# Patient Record
Sex: MALE | Race: BLACK OR AFRICAN AMERICAN | Employment: OTHER | URBAN - METROPOLITAN AREA
[De-identification: names, ages, dates, MRNs, and addresses within clinical notes are randomized per-mention and may not be internally consistent; named-entity substitution may affect disease eponyms.]

---

## 2019-01-01 ENCOUNTER — HOSPITAL ENCOUNTER (EMERGENCY)
Age: 74
Discharge: ANOTHER HOSPITAL | End: 2019-04-03
Attending: EMERGENCY MEDICINE | Admitting: EMERGENCY MEDICINE
Payer: COMMERCIAL

## 2019-01-01 ENCOUNTER — HOSPITAL ENCOUNTER (INPATIENT)
Age: 74
LOS: 1 days | Discharge: HOSPICE | DRG: 720 | End: 2019-09-02
Attending: EMERGENCY MEDICINE | Admitting: INTERNAL MEDICINE
Payer: MEDICAID

## 2019-01-01 ENCOUNTER — HOME CARE VISIT (OUTPATIENT)
Dept: HOSPICE | Facility: HOSPICE | Age: 74
End: 2019-01-01
Payer: COMMERCIAL

## 2019-01-01 ENCOUNTER — APPOINTMENT (OUTPATIENT)
Dept: GENERAL RADIOLOGY | Age: 74
End: 2019-01-01
Attending: EMERGENCY MEDICINE
Payer: COMMERCIAL

## 2019-01-01 ENCOUNTER — HOSPICE ADMISSION (OUTPATIENT)
Dept: HOSPICE | Facility: HOSPICE | Age: 74
End: 2019-01-01
Payer: COMMERCIAL

## 2019-01-01 ENCOUNTER — HOSPITAL ENCOUNTER (INPATIENT)
Age: 74
LOS: 19 days | DRG: 951 | End: 2019-09-21
Attending: FAMILY MEDICINE | Admitting: FAMILY MEDICINE
Payer: OTHER MISCELLANEOUS

## 2019-01-01 ENCOUNTER — HOSPITAL ENCOUNTER (EMERGENCY)
Age: 74
Discharge: ANOTHER HOSPITAL | End: 2019-07-30
Attending: EMERGENCY MEDICINE
Payer: COMMERCIAL

## 2019-01-01 ENCOUNTER — APPOINTMENT (OUTPATIENT)
Dept: CT IMAGING | Age: 74
End: 2019-01-01
Attending: EMERGENCY MEDICINE
Payer: COMMERCIAL

## 2019-01-01 ENCOUNTER — APPOINTMENT (OUTPATIENT)
Dept: GENERAL RADIOLOGY | Age: 74
DRG: 720 | End: 2019-01-01
Attending: EMERGENCY MEDICINE
Payer: MEDICAID

## 2019-01-01 VITALS
RESPIRATION RATE: 27 BRPM | DIASTOLIC BLOOD PRESSURE: 81 MMHG | TEMPERATURE: 97.6 F | BODY MASS INDEX: 23.3 KG/M2 | HEART RATE: 96 BPM | SYSTOLIC BLOOD PRESSURE: 118 MMHG | OXYGEN SATURATION: 97 % | WEIGHT: 145 LBS | HEIGHT: 66 IN

## 2019-01-01 VITALS
TEMPERATURE: 95.3 F | OXYGEN SATURATION: 97 % | RESPIRATION RATE: 12 BRPM | BODY MASS INDEX: 23.4 KG/M2 | WEIGHT: 145 LBS | DIASTOLIC BLOOD PRESSURE: 32 MMHG | HEART RATE: 72 BPM | SYSTOLIC BLOOD PRESSURE: 42 MMHG

## 2019-01-01 VITALS
WEIGHT: 145 LBS | HEIGHT: 66 IN | SYSTOLIC BLOOD PRESSURE: 113 MMHG | DIASTOLIC BLOOD PRESSURE: 85 MMHG | OXYGEN SATURATION: 88 % | TEMPERATURE: 97.5 F | HEART RATE: 92 BPM | BODY MASS INDEX: 23.3 KG/M2 | RESPIRATION RATE: 36 BRPM

## 2019-01-01 VITALS
BODY MASS INDEX: 23.3 KG/M2 | TEMPERATURE: 97.2 F | DIASTOLIC BLOOD PRESSURE: 44 MMHG | SYSTOLIC BLOOD PRESSURE: 71 MMHG | OXYGEN SATURATION: 60 % | HEIGHT: 66 IN | HEART RATE: 86 BPM | RESPIRATION RATE: 33 BRPM | WEIGHT: 145 LBS

## 2019-01-01 DIAGNOSIS — J90 PLEURAL EFFUSION, BILATERAL: Primary | ICD-10-CM

## 2019-01-01 DIAGNOSIS — R06.02 SOB (SHORTNESS OF BREATH): ICD-10-CM

## 2019-01-01 DIAGNOSIS — R65.21 SEPTIC SHOCK (HCC): Primary | ICD-10-CM

## 2019-01-01 DIAGNOSIS — J39.8 INCREASED TRACHEAL SECRETIONS: ICD-10-CM

## 2019-01-01 DIAGNOSIS — R52 DIFFUSE PAIN: ICD-10-CM

## 2019-01-01 DIAGNOSIS — R09.2 RESPIRATORY ARREST (HCC): Primary | ICD-10-CM

## 2019-01-01 DIAGNOSIS — A41.9 SEPSIS, DUE TO UNSPECIFIED ORGANISM: ICD-10-CM

## 2019-01-01 DIAGNOSIS — R45.1 RESTLESSNESS AND AGITATION: ICD-10-CM

## 2019-01-01 DIAGNOSIS — R06.02 SHORTNESS OF BREATH: ICD-10-CM

## 2019-01-01 DIAGNOSIS — A41.9 SEPTIC SHOCK (HCC): Primary | ICD-10-CM

## 2019-01-01 DIAGNOSIS — Z71.89 DNR (DO NOT RESUSCITATE) DISCUSSION: ICD-10-CM

## 2019-01-01 DIAGNOSIS — Z51.5 HOSPICE CARE PATIENT: ICD-10-CM

## 2019-01-01 DIAGNOSIS — S09.90XA CLOSED HEAD INJURY, INITIAL ENCOUNTER: ICD-10-CM

## 2019-01-01 DIAGNOSIS — Z71.89 GOALS OF CARE, COUNSELING/DISCUSSION: ICD-10-CM

## 2019-01-01 LAB
ALBUMIN SERPL-MCNC: 2.2 G/DL (ref 3.5–5)
ALBUMIN SERPL-MCNC: 2.4 G/DL (ref 3.5–5)
ALBUMIN SERPL-MCNC: 3.2 G/DL (ref 3.5–5)
ALBUMIN/GLOB SERPL: 0.4 {RATIO} (ref 1.1–2.2)
ALBUMIN/GLOB SERPL: 0.6 {RATIO} (ref 1.1–2.2)
ALBUMIN/GLOB SERPL: 0.7 {RATIO} (ref 1.1–2.2)
ALP SERPL-CCNC: 49 U/L (ref 45–117)
ALP SERPL-CCNC: 52 U/L (ref 45–117)
ALP SERPL-CCNC: 99 U/L (ref 45–117)
ALT SERPL-CCNC: 24 U/L (ref 12–78)
ALT SERPL-CCNC: 24 U/L (ref 12–78)
ALT SERPL-CCNC: 378 U/L (ref 12–78)
AMPHET UR QL SCN: NEGATIVE
ANION GAP SERPL CALC-SCNC: 10 MMOL/L (ref 5–15)
ANION GAP SERPL CALC-SCNC: 5 MMOL/L (ref 5–15)
ANION GAP SERPL CALC-SCNC: 8 MMOL/L (ref 5–15)
APPEARANCE UR: ABNORMAL
APPEARANCE UR: ABNORMAL
APTT PPP: 27.7 SEC (ref 22.1–32)
ARTERIAL PATENCY WRIST A: ABNORMAL
AST SERPL-CCNC: 32 U/L (ref 15–37)
AST SERPL-CCNC: 33 U/L (ref 15–37)
AST SERPL-CCNC: 971 U/L (ref 15–37)
ATRIAL RATE: 107 BPM
ATRIAL RATE: 88 BPM
ATRIAL RATE: 91 BPM
BACTERIA SPEC CULT: ABNORMAL
BACTERIA SPEC CULT: NORMAL
BACTERIA SPEC CULT: NORMAL
BACTERIA URNS QL MICRO: ABNORMAL /HPF
BACTERIA URNS QL MICRO: ABNORMAL /HPF
BARBITURATES UR QL SCN: NEGATIVE
BASE DEFICIT BLDA-SCNC: 1.8 MMOL/L
BASOPHILS # BLD: 0 K/UL (ref 0–0.1)
BASOPHILS NFR BLD: 0 % (ref 0–1)
BDY SITE: ABNORMAL
BENZODIAZ UR QL: NEGATIVE
BILIRUB SERPL-MCNC: 0.3 MG/DL (ref 0.2–1)
BILIRUB SERPL-MCNC: 0.5 MG/DL (ref 0.2–1)
BILIRUB SERPL-MCNC: 0.7 MG/DL (ref 0.2–1)
BILIRUB UR QL CFM: NEGATIVE
BILIRUB UR QL: ABNORMAL
BILIRUB UR QL: NEGATIVE
BNP SERPL-MCNC: ABNORMAL PG/ML
BNP SERPL-MCNC: ABNORMAL PG/ML
BUN SERPL-MCNC: 21 MG/DL (ref 6–20)
BUN SERPL-MCNC: 24 MG/DL (ref 6–20)
BUN SERPL-MCNC: 53 MG/DL (ref 6–20)
BUN/CREAT SERPL: 18 (ref 12–20)
BUN/CREAT SERPL: 25 (ref 12–20)
BUN/CREAT SERPL: 31 (ref 12–20)
CALCIUM SERPL-MCNC: 8.5 MG/DL (ref 8.5–10.1)
CALCIUM SERPL-MCNC: 8.6 MG/DL (ref 8.5–10.1)
CALCIUM SERPL-MCNC: 9.1 MG/DL (ref 8.5–10.1)
CALCULATED P AXIS, ECG09: 26 DEGREES
CALCULATED P AXIS, ECG09: 52 DEGREES
CALCULATED P AXIS, ECG09: 93 DEGREES
CALCULATED R AXIS, ECG10: -55 DEGREES
CALCULATED R AXIS, ECG10: -56 DEGREES
CALCULATED R AXIS, ECG10: -58 DEGREES
CALCULATED T AXIS, ECG11: 100 DEGREES
CALCULATED T AXIS, ECG11: 107 DEGREES
CALCULATED T AXIS, ECG11: 71 DEGREES
CANNABINOIDS UR QL SCN: NEGATIVE
CC UR VC: ABNORMAL
CC UR VC: ABNORMAL
CHLORIDE SERPL-SCNC: 100 MMOL/L (ref 97–108)
CHLORIDE SERPL-SCNC: 101 MMOL/L (ref 97–108)
CHLORIDE SERPL-SCNC: 110 MMOL/L (ref 97–108)
CO2 SERPL-SCNC: 24 MMOL/L (ref 21–32)
CO2 SERPL-SCNC: 28 MMOL/L (ref 21–32)
CO2 SERPL-SCNC: 29 MMOL/L (ref 21–32)
COCAINE UR QL SCN: NEGATIVE
COLOR UR: ABNORMAL
COLOR UR: ABNORMAL
COMMENT, HOLDF: NORMAL
CREAT SERPL-MCNC: 0.77 MG/DL (ref 0.7–1.3)
CREAT SERPL-MCNC: 1.14 MG/DL (ref 0.7–1.3)
CREAT SERPL-MCNC: 2.12 MG/DL (ref 0.7–1.3)
DIAGNOSIS, 93000: NORMAL
DIFFERENTIAL METHOD BLD: ABNORMAL
DRUG SCRN COMMENT,DRGCM: ABNORMAL
EOSINOPHIL # BLD: 0 K/UL (ref 0–0.4)
EOSINOPHIL # BLD: 0.2 K/UL (ref 0–0.4)
EOSINOPHIL # BLD: 0.3 K/UL (ref 0–0.4)
EOSINOPHIL NFR BLD: 0 % (ref 0–7)
EOSINOPHIL NFR BLD: 1 % (ref 0–7)
EOSINOPHIL NFR BLD: 4 % (ref 0–7)
EPITH CASTS URNS QL MICRO: ABNORMAL /LPF
EPITH CASTS URNS QL MICRO: ABNORMAL /LPF
ERYTHROCYTE [DISTWIDTH] IN BLOOD BY AUTOMATED COUNT: 13.3 % (ref 11.5–14.5)
ERYTHROCYTE [DISTWIDTH] IN BLOOD BY AUTOMATED COUNT: 16.3 % (ref 11.5–14.5)
ERYTHROCYTE [DISTWIDTH] IN BLOOD BY AUTOMATED COUNT: 17.6 % (ref 11.5–14.5)
ETHANOL SERPL-MCNC: <10 MG/DL
GAS FLOW.O2 O2 DELIVERY SYS: 12 L/MIN
GLOBULIN SER CALC-MCNC: 4.1 G/DL (ref 2–4)
GLOBULIN SER CALC-MCNC: 4.4 G/DL (ref 2–4)
GLOBULIN SER CALC-MCNC: 5.2 G/DL (ref 2–4)
GLUCOSE SERPL-MCNC: 100 MG/DL (ref 65–100)
GLUCOSE SERPL-MCNC: 122 MG/DL (ref 65–100)
GLUCOSE SERPL-MCNC: 249 MG/DL (ref 65–100)
GLUCOSE UR STRIP.AUTO-MCNC: 250 MG/DL
GLUCOSE UR STRIP.AUTO-MCNC: NEGATIVE MG/DL
HCO3 BLDA-SCNC: 20 MMOL/L (ref 22–26)
HCT VFR BLD AUTO: 35.1 % (ref 36.6–50.3)
HCT VFR BLD AUTO: 38.9 % (ref 36.6–50.3)
HCT VFR BLD AUTO: 40.1 % (ref 36.6–50.3)
HGB BLD-MCNC: 11.1 G/DL (ref 12.1–17)
HGB BLD-MCNC: 12.2 G/DL (ref 12.1–17)
HGB BLD-MCNC: 12.3 G/DL (ref 12.1–17)
HGB UR QL STRIP: ABNORMAL
HGB UR QL STRIP: NEGATIVE
IMM GRANULOCYTES # BLD AUTO: 0 K/UL (ref 0–0.04)
IMM GRANULOCYTES # BLD AUTO: 0 K/UL (ref 0–0.04)
IMM GRANULOCYTES # BLD AUTO: 0.1 K/UL (ref 0–0.04)
IMM GRANULOCYTES NFR BLD AUTO: 0 % (ref 0–0.5)
IMM GRANULOCYTES NFR BLD AUTO: 0 % (ref 0–0.5)
IMM GRANULOCYTES NFR BLD AUTO: 1 % (ref 0–0.5)
INR PPP: 1.1 (ref 0.9–1.1)
KETONES UR QL STRIP.AUTO: ABNORMAL MG/DL
KETONES UR QL STRIP.AUTO: NEGATIVE MG/DL
LACTATE BLD-SCNC: 2.1 MMOL/L (ref 0.4–2)
LACTATE BLD-SCNC: 2.43 MMOL/L (ref 0.4–2)
LEUKOCYTE ESTERASE UR QL STRIP.AUTO: ABNORMAL
LEUKOCYTE ESTERASE UR QL STRIP.AUTO: ABNORMAL
LYMPHOCYTES # BLD: 1.1 K/UL (ref 0.8–3.5)
LYMPHOCYTES # BLD: 1.9 K/UL (ref 0.8–3.5)
LYMPHOCYTES # BLD: 5.3 K/UL (ref 0.8–3.5)
LYMPHOCYTES NFR BLD: 13 % (ref 12–49)
LYMPHOCYTES NFR BLD: 25 % (ref 12–49)
LYMPHOCYTES NFR BLD: 27 % (ref 12–49)
MAGNESIUM SERPL-MCNC: 2.2 MG/DL (ref 1.6–2.4)
MCH RBC QN AUTO: 33.2 PG (ref 26–34)
MCH RBC QN AUTO: 33.4 PG (ref 26–34)
MCH RBC QN AUTO: 34.5 PG (ref 26–34)
MCHC RBC AUTO-ENTMCNC: 30.7 G/DL (ref 30–36.5)
MCHC RBC AUTO-ENTMCNC: 31.4 G/DL (ref 30–36.5)
MCHC RBC AUTO-ENTMCNC: 31.6 G/DL (ref 30–36.5)
MCV RBC AUTO: 105.7 FL (ref 80–99)
MCV RBC AUTO: 108.4 FL (ref 80–99)
MCV RBC AUTO: 109.9 FL (ref 80–99)
METHADONE UR QL: NEGATIVE
MONOCYTES # BLD: 0.4 K/UL (ref 0–1)
MONOCYTES # BLD: 0.6 K/UL (ref 0–1)
MONOCYTES # BLD: 1.9 K/UL (ref 0–1)
MONOCYTES NFR BLD: 5 % (ref 5–13)
MONOCYTES NFR BLD: 9 % (ref 5–13)
MONOCYTES NFR BLD: 9 % (ref 5–13)
MYELOCYTES NFR BLD MANUAL: 1 %
NEUTS BAND NFR BLD MANUAL: 3 %
NEUTS SEG # BLD: 13.5 K/UL (ref 1.8–8)
NEUTS SEG # BLD: 4.4 K/UL (ref 1.8–8)
NEUTS SEG # BLD: 6.9 K/UL (ref 1.8–8)
NEUTS SEG NFR BLD: 60 % (ref 32–75)
NEUTS SEG NFR BLD: 61 % (ref 32–75)
NEUTS SEG NFR BLD: 81 % (ref 32–75)
NITRITE UR QL STRIP.AUTO: NEGATIVE
NITRITE UR QL STRIP.AUTO: POSITIVE
NRBC # BLD: 0 K/UL (ref 0–0.01)
NRBC # BLD: 0.09 K/UL (ref 0–0.01)
NRBC BLD-RTO: 0 PER 100 WBC
NRBC BLD-RTO: 1.1 PER 100 WBC
OPIATES UR QL: POSITIVE
P-R INTERVAL, ECG05: 176 MS
P-R INTERVAL, ECG05: 196 MS
P-R INTERVAL, ECG05: 224 MS
PCO2 BLDA: 28 MMHG (ref 35–45)
PCP UR QL: NEGATIVE
PH BLDA: 7.48 [PH] (ref 7.35–7.45)
PH UR STRIP: 6.5 [PH] (ref 5–8)
PH UR STRIP: 7.5 [PH] (ref 5–8)
PLATELET # BLD AUTO: 177 K/UL (ref 150–400)
PLATELET # BLD AUTO: 312 K/UL (ref 150–400)
PLATELET # BLD AUTO: 380 K/UL (ref 150–400)
PLATELET COMMENTS,PCOM: ABNORMAL
PMV BLD AUTO: 10.3 FL (ref 8.9–12.9)
PMV BLD AUTO: 10.8 FL (ref 8.9–12.9)
PMV BLD AUTO: 10.9 FL (ref 8.9–12.9)
PO2 BLDA: 133 MMHG (ref 80–100)
POTASSIUM SERPL-SCNC: 4.3 MMOL/L (ref 3.5–5.1)
POTASSIUM SERPL-SCNC: 4.7 MMOL/L (ref 3.5–5.1)
POTASSIUM SERPL-SCNC: 5.3 MMOL/L (ref 3.5–5.1)
PROT SERPL-MCNC: 6.5 G/DL (ref 6.4–8.2)
PROT SERPL-MCNC: 7.4 G/DL (ref 6.4–8.2)
PROT SERPL-MCNC: 7.6 G/DL (ref 6.4–8.2)
PROT UR STRIP-MCNC: >300 MG/DL
PROT UR STRIP-MCNC: NEGATIVE MG/DL
PROTHROMBIN TIME: 10.4 SEC (ref 9–11.1)
Q-T INTERVAL, ECG07: 320 MS
Q-T INTERVAL, ECG07: 366 MS
Q-T INTERVAL, ECG07: 398 MS
QRS DURATION, ECG06: 106 MS
QRS DURATION, ECG06: 118 MS
QRS DURATION, ECG06: 132 MS
QTC CALCULATION (BEZET), ECG08: 425 MS
QTC CALCULATION (BEZET), ECG08: 442 MS
QTC CALCULATION (BEZET), ECG08: 489 MS
RBC # BLD AUTO: 3.32 M/UL (ref 4.1–5.7)
RBC # BLD AUTO: 3.54 M/UL (ref 4.1–5.7)
RBC # BLD AUTO: 3.7 M/UL (ref 4.1–5.7)
RBC #/AREA URNS HPF: ABNORMAL /HPF (ref 0–5)
RBC #/AREA URNS HPF: ABNORMAL /HPF (ref 0–5)
RBC MORPH BLD: ABNORMAL
SAMPLES BEING HELD,HOLD: NORMAL
SAO2 % BLD: 99 % (ref 92–97)
SAO2% DEVICE SAO2% SENSOR NAME: ABNORMAL
SERVICE CMNT-IMP: ABNORMAL
SERVICE CMNT-IMP: ABNORMAL
SERVICE CMNT-IMP: NORMAL
SERVICE CMNT-IMP: NORMAL
SODIUM SERPL-SCNC: 137 MMOL/L (ref 136–145)
SODIUM SERPL-SCNC: 139 MMOL/L (ref 136–145)
SODIUM SERPL-SCNC: 139 MMOL/L (ref 136–145)
SP GR UR REFRACTOMETRY: 1.01 (ref 1–1.03)
SP GR UR REFRACTOMETRY: 1.02 (ref 1–1.03)
SPECIMEN SITE: ABNORMAL
THERAPEUTIC RANGE,PTTT: NORMAL SECS (ref 58–77)
TROPONIN I SERPL-MCNC: 0.13 NG/ML
TROPONIN I SERPL-MCNC: 0.54 NG/ML
TROPONIN I SERPL-MCNC: 0.79 NG/ML
UA: UC IF INDICATED,UAUC: ABNORMAL
UA: UC IF INDICATED,UAUC: ABNORMAL
UR CULT HOLD, URHOLD: NORMAL
UROBILINOGEN UR QL STRIP.AUTO: 0.2 EU/DL (ref 0.2–1)
UROBILINOGEN UR QL STRIP.AUTO: 1 EU/DL (ref 0.2–1)
VENTRICULAR RATE, ECG03: 106 BPM
VENTRICULAR RATE, ECG03: 88 BPM
VENTRICULAR RATE, ECG03: 91 BPM
WBC # BLD AUTO: 21.1 K/UL (ref 4.1–11.1)
WBC # BLD AUTO: 7.2 K/UL (ref 4.1–11.1)
WBC # BLD AUTO: 8.5 K/UL (ref 4.1–11.1)
WBC MORPH BLD: ABNORMAL
WBC URNS QL MICRO: ABNORMAL /HPF (ref 0–4)
WBC URNS QL MICRO: ABNORMAL /HPF (ref 0–4)

## 2019-01-01 PROCEDURE — 77030011256 HC DRSG MEPILEX <16IN NO BORD MOLN -A

## 2019-01-01 PROCEDURE — 74011250637 HC RX REV CODE- 250/637: Performed by: FAMILY MEDICINE

## 2019-01-01 PROCEDURE — 65270000029 HC RM PRIVATE

## 2019-01-01 PROCEDURE — 74011000250 HC RX REV CODE- 250: Performed by: FAMILY MEDICINE

## 2019-01-01 PROCEDURE — G0299 HHS/HOSPICE OF RN EA 15 MIN: HCPCS

## 2019-01-01 PROCEDURE — 74011250636 HC RX REV CODE- 250/636: Performed by: INTERNAL MEDICINE

## 2019-01-01 PROCEDURE — 87086 URINE CULTURE/COLONY COUNT: CPT

## 2019-01-01 PROCEDURE — 0656 HSPC GENERAL INPATIENT

## 2019-01-01 PROCEDURE — 84484 ASSAY OF TROPONIN QUANT: CPT

## 2019-01-01 PROCEDURE — 87077 CULTURE AEROBIC IDENTIFY: CPT

## 2019-01-01 PROCEDURE — 36415 COLL VENOUS BLD VENIPUNCTURE: CPT

## 2019-01-01 PROCEDURE — 94760 N-INVAS EAR/PLS OXIMETRY 1: CPT

## 2019-01-01 PROCEDURE — 93005 ELECTROCARDIOGRAM TRACING: CPT

## 2019-01-01 PROCEDURE — 74011250636 HC RX REV CODE- 250/636: Performed by: EMERGENCY MEDICINE

## 2019-01-01 PROCEDURE — 83880 ASSAY OF NATRIURETIC PEPTIDE: CPT

## 2019-01-01 PROCEDURE — 74011250636 HC RX REV CODE- 250/636: Performed by: FAMILY MEDICINE

## 2019-01-01 PROCEDURE — 71045 X-RAY EXAM CHEST 1 VIEW: CPT

## 2019-01-01 PROCEDURE — 96361 HYDRATE IV INFUSION ADD-ON: CPT

## 2019-01-01 PROCEDURE — 77030018846 HC SOL IRR STRL H20 ICUM -A

## 2019-01-01 PROCEDURE — 80053 COMPREHEN METABOLIC PANEL: CPT

## 2019-01-01 PROCEDURE — 36600 WITHDRAWAL OF ARTERIAL BLOOD: CPT

## 2019-01-01 PROCEDURE — 82803 BLOOD GASES ANY COMBINATION: CPT

## 2019-01-01 PROCEDURE — 77030013033 HC MSK BPAP/CPAP MMKA -B

## 2019-01-01 PROCEDURE — 94762 N-INVAS EAR/PLS OXIMTRY CONT: CPT

## 2019-01-01 PROCEDURE — 87186 SC STD MICRODIL/AGAR DIL: CPT

## 2019-01-01 PROCEDURE — 71250 CT THORAX DX C-: CPT

## 2019-01-01 PROCEDURE — 85730 THROMBOPLASTIN TIME PARTIAL: CPT

## 2019-01-01 PROCEDURE — 81001 URINALYSIS AUTO W/SCOPE: CPT

## 2019-01-01 PROCEDURE — 3336500001 HSPC ELECTION

## 2019-01-01 PROCEDURE — 77010033678 HC OXYGEN DAILY

## 2019-01-01 PROCEDURE — 99285 EMERGENCY DEPT VISIT HI MDM: CPT

## 2019-01-01 PROCEDURE — 74011000258 HC RX REV CODE- 258: Performed by: EMERGENCY MEDICINE

## 2019-01-01 PROCEDURE — 74011250637 HC RX REV CODE- 250/637: Performed by: INTERNAL MEDICINE

## 2019-01-01 PROCEDURE — 77030018729 HC ELECTRD DEFIB PAD CARD -B

## 2019-01-01 PROCEDURE — 77030006564

## 2019-01-01 PROCEDURE — 83605 ASSAY OF LACTIC ACID: CPT

## 2019-01-01 PROCEDURE — 96374 THER/PROPH/DIAG INJ IV PUSH: CPT

## 2019-01-01 PROCEDURE — 74011000250 HC RX REV CODE- 250: Performed by: EMERGENCY MEDICINE

## 2019-01-01 PROCEDURE — 74011250637 HC RX REV CODE- 250/637: Performed by: EMERGENCY MEDICINE

## 2019-01-01 PROCEDURE — 85025 COMPLETE CBC W/AUTO DIFF WBC: CPT

## 2019-01-01 PROCEDURE — 87040 BLOOD CULTURE FOR BACTERIA: CPT

## 2019-01-01 PROCEDURE — 83735 ASSAY OF MAGNESIUM: CPT

## 2019-01-01 PROCEDURE — 75810000137 HC PLCMT CENT VENOUS CATH

## 2019-01-01 PROCEDURE — C1751 CATH, INF, PER/CENT/MIDLINE: HCPCS

## 2019-01-01 PROCEDURE — 80307 DRUG TEST PRSMV CHEM ANLYZR: CPT

## 2019-01-01 PROCEDURE — 94640 AIRWAY INHALATION TREATMENT: CPT

## 2019-01-01 PROCEDURE — 85610 PROTHROMBIN TIME: CPT

## 2019-01-01 PROCEDURE — 77030021668 HC NEB PREFIL KT VYRM -A

## 2019-01-01 PROCEDURE — 96368 THER/DIAG CONCURRENT INF: CPT

## 2019-01-01 PROCEDURE — 96365 THER/PROPH/DIAG IV INF INIT: CPT

## 2019-01-01 PROCEDURE — 77030039264

## 2019-01-01 RX ORDER — LORAZEPAM 2 MG/ML
1 CONCENTRATE ORAL
Status: DISCONTINUED | OUTPATIENT
Start: 2019-01-01 | End: 2019-01-01 | Stop reason: HOSPADM

## 2019-01-01 RX ORDER — VANCOMYCIN/0.9 % SOD CHLORIDE 1.5G/250ML
1500 PLASTIC BAG, INJECTION (ML) INTRAVENOUS ONCE
Status: COMPLETED | OUTPATIENT
Start: 2019-01-01 | End: 2019-01-01

## 2019-01-01 RX ORDER — ALBUTEROL SULFATE 0.83 MG/ML
2.5 SOLUTION RESPIRATORY (INHALATION) EVERY 6 HOURS
COMMUNITY

## 2019-01-01 RX ORDER — ACETAMINOPHEN 650 MG/1
650 SUPPOSITORY RECTAL
Status: COMPLETED | OUTPATIENT
Start: 2019-01-01 | End: 2019-01-01

## 2019-01-01 RX ORDER — MORPHINE SULFATE 4 MG/ML
2 INJECTION INTRAVENOUS
Status: DISCONTINUED | OUTPATIENT
Start: 2019-01-01 | End: 2019-01-01 | Stop reason: HOSPADM

## 2019-01-01 RX ORDER — ONDANSETRON 2 MG/ML
4 INJECTION INTRAMUSCULAR; INTRAVENOUS
Status: DISCONTINUED | OUTPATIENT
Start: 2019-01-01 | End: 2019-01-01 | Stop reason: HOSPADM

## 2019-01-01 RX ORDER — LANOLIN ALCOHOL/MO/W.PET/CERES
400 CREAM (GRAM) TOPICAL DAILY
COMMUNITY

## 2019-01-01 RX ORDER — MORPHINE SULFATE 4 MG/ML
2 INJECTION INTRAVENOUS
Status: DISCONTINUED | OUTPATIENT
Start: 2019-01-01 | End: 2019-01-01

## 2019-01-01 RX ORDER — MORPHINE SULFATE 4 MG/ML
3 INJECTION INTRAVENOUS
Status: DISCONTINUED | OUTPATIENT
Start: 2019-01-01 | End: 2019-01-01

## 2019-01-01 RX ORDER — METRONIDAZOLE 500 MG/100ML
500 INJECTION, SOLUTION INTRAVENOUS EVERY 12 HOURS
Status: DISCONTINUED | OUTPATIENT
Start: 2019-01-01 | End: 2019-01-01

## 2019-01-01 RX ORDER — LORAZEPAM 2 MG/ML
1 INJECTION INTRAMUSCULAR
Status: DISCONTINUED | OUTPATIENT
Start: 2019-01-01 | End: 2019-01-01 | Stop reason: HOSPADM

## 2019-01-01 RX ORDER — FACIAL-BODY WIPES
10 EACH TOPICAL DAILY PRN
Status: DISCONTINUED | OUTPATIENT
Start: 2019-01-01 | End: 2019-01-01 | Stop reason: HOSPADM

## 2019-01-01 RX ORDER — SODIUM CHLORIDE 0.9 % (FLUSH) 0.9 %
5-10 SYRINGE (ML) INJECTION AS NEEDED
Status: DISCONTINUED | OUTPATIENT
Start: 2019-01-01 | End: 2019-01-01 | Stop reason: HOSPADM

## 2019-01-01 RX ORDER — SCOLOPAMINE TRANSDERMAL SYSTEM 1 MG/1
1 PATCH, EXTENDED RELEASE TRANSDERMAL
Status: DISCONTINUED | OUTPATIENT
Start: 2019-01-01 | End: 2019-01-01 | Stop reason: HOSPADM

## 2019-01-01 RX ORDER — RANITIDINE 150 MG/1
150 TABLET, FILM COATED ORAL 2 TIMES DAILY
COMMUNITY

## 2019-01-01 RX ORDER — OXYBUTYNIN CHLORIDE 5 MG/1
5 TABLET ORAL 3 TIMES DAILY
COMMUNITY

## 2019-01-01 RX ORDER — KETOROLAC TROMETHAMINE 30 MG/ML
15 INJECTION, SOLUTION INTRAMUSCULAR; INTRAVENOUS
Status: DISPENSED | OUTPATIENT
Start: 2019-01-01 | End: 2019-01-01

## 2019-01-01 RX ORDER — CARVEDILOL 3.12 MG/1
3.12 TABLET ORAL 2 TIMES DAILY WITH MEALS
COMMUNITY

## 2019-01-01 RX ORDER — KETOROLAC TROMETHAMINE 30 MG/ML
30 INJECTION, SOLUTION INTRAMUSCULAR; INTRAVENOUS
Status: ACTIVE | OUTPATIENT
Start: 2019-01-01 | End: 2019-01-01

## 2019-01-01 RX ORDER — DIPHENHYDRAMINE HCL 25 MG
25 TABLET ORAL
COMMUNITY

## 2019-01-01 RX ORDER — PANTOPRAZOLE SODIUM 40 MG/1
40 GRANULE, DELAYED RELEASE ORAL
COMMUNITY

## 2019-01-01 RX ORDER — MORPHINE SULFATE 4 MG/ML
4 INJECTION INTRAVENOUS
Status: DISCONTINUED | OUTPATIENT
Start: 2019-01-01 | End: 2019-01-01

## 2019-01-01 RX ORDER — ACETAMINOPHEN 650 MG/1
650 SUPPOSITORY RECTAL
Status: DISCONTINUED | OUTPATIENT
Start: 2019-01-01 | End: 2019-01-01 | Stop reason: HOSPADM

## 2019-01-01 RX ORDER — KETOROLAC TROMETHAMINE 30 MG/ML
30 INJECTION, SOLUTION INTRAMUSCULAR; INTRAVENOUS
Status: DISCONTINUED | OUTPATIENT
Start: 2019-01-01 | End: 2019-01-01 | Stop reason: HOSPADM

## 2019-01-01 RX ORDER — GUAIFENESIN 100 MG/5ML
600 SOLUTION ORAL
COMMUNITY

## 2019-01-01 RX ORDER — MAGNESIUM SULFATE HEPTAHYDRATE 40 MG/ML
2 INJECTION, SOLUTION INTRAVENOUS
Status: COMPLETED | OUTPATIENT
Start: 2019-01-01 | End: 2019-01-01

## 2019-01-01 RX ORDER — ACETAMINOPHEN 325 MG/1
650 TABLET ORAL
Status: DISCONTINUED | OUTPATIENT
Start: 2019-01-01 | End: 2019-01-01 | Stop reason: HOSPADM

## 2019-01-01 RX ORDER — MORPHINE SULFATE 4 MG/ML
4 INJECTION INTRAVENOUS
Status: DISCONTINUED | OUTPATIENT
Start: 2019-01-01 | End: 2019-01-01 | Stop reason: HOSPADM

## 2019-01-01 RX ORDER — IPRATROPIUM BROMIDE AND ALBUTEROL SULFATE 2.5; .5 MG/3ML; MG/3ML
SOLUTION RESPIRATORY (INHALATION)
Status: DISCONTINUED
Start: 2019-01-01 | End: 2019-01-01 | Stop reason: HOSPADM

## 2019-01-01 RX ORDER — ACETAMINOPHEN 325 MG/1
650 TABLET ORAL
COMMUNITY

## 2019-01-01 RX ORDER — MORPHINE SULFATE 20 MG/ML
10 SOLUTION ORAL
Status: DISCONTINUED | OUTPATIENT
Start: 2019-01-01 | End: 2019-01-01 | Stop reason: HOSPADM

## 2019-01-01 RX ORDER — GLYCOPYRROLATE 0.2 MG/ML
0.2 INJECTION INTRAMUSCULAR; INTRAVENOUS
Status: DISCONTINUED | OUTPATIENT
Start: 2019-01-01 | End: 2019-01-01 | Stop reason: HOSPADM

## 2019-01-01 RX ORDER — AMIODARONE HYDROCHLORIDE 200 MG/1
200 TABLET ORAL DAILY
COMMUNITY

## 2019-01-01 RX ORDER — ALBUTEROL SULFATE 0.83 MG/ML
2.5 SOLUTION RESPIRATORY (INHALATION)
Status: DISCONTINUED | OUTPATIENT
Start: 2019-01-01 | End: 2019-01-01 | Stop reason: HOSPADM

## 2019-01-01 RX ORDER — FUROSEMIDE 10 MG/ML
40 INJECTION INTRAMUSCULAR; INTRAVENOUS
Status: COMPLETED | OUTPATIENT
Start: 2019-01-01 | End: 2019-01-01

## 2019-01-01 RX ORDER — ONDANSETRON 4 MG/1
4 TABLET, FILM COATED ORAL
COMMUNITY

## 2019-01-01 RX ORDER — MORPHINE SULFATE 30 MG/1
15 TABLET ORAL
COMMUNITY

## 2019-01-01 RX ORDER — LEVALBUTEROL TARTRATE 45 UG/1
2 AEROSOL, METERED ORAL 2 TIMES DAILY
COMMUNITY

## 2019-01-01 RX ORDER — MORPHINE SULFATE 4 MG/ML
6 INJECTION INTRAVENOUS
Status: DISCONTINUED | OUTPATIENT
Start: 2019-01-01 | End: 2019-01-01 | Stop reason: HOSPADM

## 2019-01-01 RX ORDER — GLYCOPYRROLATE 0.2 MG/ML
0.2 INJECTION INTRAMUSCULAR; INTRAVENOUS EVERY 4 HOURS
Status: DISCONTINUED | OUTPATIENT
Start: 2019-01-01 | End: 2019-01-01 | Stop reason: HOSPADM

## 2019-01-01 RX ORDER — HYDROXYZINE 25 MG/1
12.5 TABLET, FILM COATED ORAL
COMMUNITY

## 2019-01-01 RX ORDER — PROCHLORPERAZINE EDISYLATE 5 MG/ML
10 INJECTION INTRAMUSCULAR; INTRAVENOUS
Status: DISCONTINUED | OUTPATIENT
Start: 2019-01-01 | End: 2019-01-01 | Stop reason: HOSPADM

## 2019-01-01 RX ADMIN — SODIUM CHLORIDE 500 ML: 900 INJECTION, SOLUTION INTRAVENOUS at 17:45

## 2019-01-01 RX ADMIN — GLYCOPYRROLATE 0.2 MG: 0.2 INJECTION INTRAMUSCULAR; INTRAVENOUS at 05:51

## 2019-01-01 RX ADMIN — GLYCOPYRROLATE 0.2 MG: 0.2 INJECTION INTRAMUSCULAR; INTRAVENOUS at 21:17

## 2019-01-01 RX ADMIN — MORPHINE SULFATE 4 MG: 4 INJECTION, SOLUTION INTRAMUSCULAR; INTRAVENOUS at 05:02

## 2019-01-01 RX ADMIN — MORPHINE SULFATE 4 MG: 4 INJECTION, SOLUTION INTRAMUSCULAR; INTRAVENOUS at 21:17

## 2019-01-01 RX ADMIN — GLYCOPYRROLATE 0.2 MG: 0.2 INJECTION INTRAMUSCULAR; INTRAVENOUS at 08:27

## 2019-01-01 RX ADMIN — MORPHINE SULFATE 2 MG: 4 INJECTION, SOLUTION INTRAMUSCULAR; INTRAVENOUS at 01:24

## 2019-01-01 RX ADMIN — MORPHINE SULFATE 4 MG: 4 INJECTION, SOLUTION INTRAMUSCULAR; INTRAVENOUS at 10:40

## 2019-01-01 RX ADMIN — MORPHINE SULFATE 4 MG: 4 INJECTION, SOLUTION INTRAMUSCULAR; INTRAVENOUS at 21:11

## 2019-01-01 RX ADMIN — MORPHINE SULFATE 4 MG: 4 INJECTION, SOLUTION INTRAMUSCULAR; INTRAVENOUS at 11:15

## 2019-01-01 RX ADMIN — GLYCOPYRROLATE 0.2 MG: 0.2 INJECTION INTRAMUSCULAR; INTRAVENOUS at 21:10

## 2019-01-01 RX ADMIN — GLYCOPYRROLATE 0.2 MG: 0.2 INJECTION INTRAMUSCULAR; INTRAVENOUS at 12:34

## 2019-01-01 RX ADMIN — MORPHINE SULFATE 2 MG: 4 INJECTION, SOLUTION INTRAMUSCULAR; INTRAVENOUS at 06:57

## 2019-01-01 RX ADMIN — GLYCOPYRROLATE 0.2 MG: 0.2 INJECTION INTRAMUSCULAR; INTRAVENOUS at 21:50

## 2019-01-01 RX ADMIN — GLYCOPYRROLATE 0.2 MG: 0.2 INJECTION INTRAMUSCULAR; INTRAVENOUS at 21:06

## 2019-01-01 RX ADMIN — MORPHINE SULFATE 4 MG: 4 INJECTION, SOLUTION INTRAMUSCULAR; INTRAVENOUS at 18:37

## 2019-01-01 RX ADMIN — MORPHINE SULFATE 4 MG: 4 INJECTION, SOLUTION INTRAMUSCULAR; INTRAVENOUS at 16:57

## 2019-01-01 RX ADMIN — MORPHINE SULFATE 4 MG: 4 INJECTION, SOLUTION INTRAMUSCULAR; INTRAVENOUS at 03:48

## 2019-01-01 RX ADMIN — MORPHINE SULFATE 2 MG: 4 INJECTION, SOLUTION INTRAMUSCULAR; INTRAVENOUS at 23:24

## 2019-01-01 RX ADMIN — MORPHINE SULFATE 4 MG: 4 INJECTION, SOLUTION INTRAMUSCULAR; INTRAVENOUS at 14:34

## 2019-01-01 RX ADMIN — MORPHINE SULFATE 4 MG: 4 INJECTION, SOLUTION INTRAMUSCULAR; INTRAVENOUS at 15:33

## 2019-01-01 RX ADMIN — GLYCOPYRROLATE 0.2 MG: 0.2 INJECTION INTRAMUSCULAR; INTRAVENOUS at 09:26

## 2019-01-01 RX ADMIN — GLYCOPYRROLATE 0.2 MG: 0.2 INJECTION INTRAMUSCULAR; INTRAVENOUS at 13:21

## 2019-01-01 RX ADMIN — MORPHINE SULFATE 4 MG: 4 INJECTION, SOLUTION INTRAMUSCULAR; INTRAVENOUS at 08:02

## 2019-01-01 RX ADMIN — MORPHINE SULFATE 4 MG: 4 INJECTION, SOLUTION INTRAMUSCULAR; INTRAVENOUS at 01:01

## 2019-01-01 RX ADMIN — GLYCOPYRROLATE 0.2 MG: 0.2 INJECTION INTRAMUSCULAR; INTRAVENOUS at 17:17

## 2019-01-01 RX ADMIN — MINERAL OIL, PETROLATUM: 425; 568 OINTMENT OPHTHALMIC at 21:16

## 2019-01-01 RX ADMIN — MORPHINE SULFATE 3 MG: 4 INJECTION, SOLUTION INTRAMUSCULAR; INTRAVENOUS at 04:14

## 2019-01-01 RX ADMIN — GLYCOPYRROLATE 0.2 MG: 0.2 INJECTION INTRAMUSCULAR; INTRAVENOUS at 03:07

## 2019-01-01 RX ADMIN — MORPHINE SULFATE 2 MG: 4 INJECTION, SOLUTION INTRAMUSCULAR; INTRAVENOUS at 06:53

## 2019-01-01 RX ADMIN — SODIUM CHLORIDE 1000 ML: 900 INJECTION, SOLUTION INTRAVENOUS at 19:18

## 2019-01-01 RX ADMIN — VANCOMYCIN HYDROCHLORIDE 1500 MG: 10 INJECTION, POWDER, LYOPHILIZED, FOR SOLUTION INTRAVENOUS at 18:06

## 2019-01-01 RX ADMIN — MORPHINE SULFATE 4 MG: 4 INJECTION, SOLUTION INTRAMUSCULAR; INTRAVENOUS at 14:35

## 2019-01-01 RX ADMIN — MORPHINE SULFATE 4 MG: 4 INJECTION, SOLUTION INTRAMUSCULAR; INTRAVENOUS at 17:32

## 2019-01-01 RX ADMIN — MINERAL OIL, PETROLATUM: 425; 568 OINTMENT OPHTHALMIC at 21:39

## 2019-01-01 RX ADMIN — GLYCOPYRROLATE 0.2 MG: 0.2 INJECTION INTRAMUSCULAR; INTRAVENOUS at 08:29

## 2019-01-01 RX ADMIN — GLYCOPYRROLATE 0.2 MG: 0.2 INJECTION INTRAMUSCULAR; INTRAVENOUS at 12:36

## 2019-01-01 RX ADMIN — GLYCOPYRROLATE 0.2 MG: 0.2 INJECTION INTRAMUSCULAR; INTRAVENOUS at 00:35

## 2019-01-01 RX ADMIN — MORPHINE SULFATE 4 MG: 4 INJECTION, SOLUTION INTRAMUSCULAR; INTRAVENOUS at 11:13

## 2019-01-01 RX ADMIN — GLYCOPYRROLATE 0.2 MG: 0.2 INJECTION INTRAMUSCULAR; INTRAVENOUS at 04:58

## 2019-01-01 RX ADMIN — MORPHINE SULFATE 4 MG: 4 INJECTION, SOLUTION INTRAMUSCULAR; INTRAVENOUS at 23:05

## 2019-01-01 RX ADMIN — GLYCOPYRROLATE 0.2 MG: 0.2 INJECTION INTRAMUSCULAR; INTRAVENOUS at 09:02

## 2019-01-01 RX ADMIN — GLYCOPYRROLATE 0.2 MG: 0.2 INJECTION INTRAMUSCULAR; INTRAVENOUS at 16:40

## 2019-01-01 RX ADMIN — MORPHINE SULFATE 2 MG: 4 INJECTION, SOLUTION INTRAMUSCULAR; INTRAVENOUS at 08:36

## 2019-01-01 RX ADMIN — GLYCOPYRROLATE 0.2 MG: 0.2 INJECTION INTRAMUSCULAR; INTRAVENOUS at 21:00

## 2019-01-01 RX ADMIN — MINERAL OIL, PETROLATUM: 425; 568 OINTMENT OPHTHALMIC at 22:05

## 2019-01-01 RX ADMIN — MORPHINE SULFATE 4 MG: 4 INJECTION, SOLUTION INTRAMUSCULAR; INTRAVENOUS at 13:03

## 2019-01-01 RX ADMIN — GLYCOPYRROLATE 0.2 MG: 0.2 INJECTION INTRAMUSCULAR; INTRAVENOUS at 08:25

## 2019-01-01 RX ADMIN — GLYCOPYRROLATE 0.2 MG: 0.2 INJECTION INTRAMUSCULAR; INTRAVENOUS at 17:06

## 2019-01-01 RX ADMIN — MORPHINE SULFATE 4 MG: 4 INJECTION, SOLUTION INTRAMUSCULAR; INTRAVENOUS at 04:58

## 2019-01-01 RX ADMIN — MORPHINE SULFATE 4 MG: 4 INJECTION, SOLUTION INTRAMUSCULAR; INTRAVENOUS at 09:03

## 2019-01-01 RX ADMIN — MORPHINE SULFATE 4 MG: 4 INJECTION, SOLUTION INTRAMUSCULAR; INTRAVENOUS at 04:49

## 2019-01-01 RX ADMIN — GLYCOPYRROLATE 0.2 MG: 0.2 INJECTION INTRAMUSCULAR; INTRAVENOUS at 12:52

## 2019-01-01 RX ADMIN — GLYCOPYRROLATE 0.2 MG: 0.2 INJECTION INTRAMUSCULAR; INTRAVENOUS at 08:56

## 2019-01-01 RX ADMIN — GLYCOPYRROLATE 0.2 MG: 0.2 INJECTION INTRAMUSCULAR; INTRAVENOUS at 01:01

## 2019-01-01 RX ADMIN — MORPHINE SULFATE 2 MG: 4 INJECTION, SOLUTION INTRAMUSCULAR; INTRAVENOUS at 17:01

## 2019-01-01 RX ADMIN — MORPHINE SULFATE 2 MG: 4 INJECTION, SOLUTION INTRAMUSCULAR; INTRAVENOUS at 06:35

## 2019-01-01 RX ADMIN — MORPHINE SULFATE 4 MG: 4 INJECTION, SOLUTION INTRAMUSCULAR; INTRAVENOUS at 11:21

## 2019-01-01 RX ADMIN — GLYCOPYRROLATE 0.2 MG: 0.2 INJECTION INTRAMUSCULAR; INTRAVENOUS at 20:04

## 2019-01-01 RX ADMIN — MORPHINE SULFATE 2 MG: 4 INJECTION, SOLUTION INTRAMUSCULAR; INTRAVENOUS at 18:53

## 2019-01-01 RX ADMIN — GLYCOPYRROLATE 0.2 MG: 0.2 INJECTION INTRAMUSCULAR; INTRAVENOUS at 01:25

## 2019-01-01 RX ADMIN — MORPHINE SULFATE 4 MG: 4 INJECTION, SOLUTION INTRAMUSCULAR; INTRAVENOUS at 12:59

## 2019-01-01 RX ADMIN — MORPHINE SULFATE 2 MG: 4 INJECTION, SOLUTION INTRAMUSCULAR; INTRAVENOUS at 01:21

## 2019-01-01 RX ADMIN — MORPHINE SULFATE 6 MG: 4 INJECTION, SOLUTION INTRAMUSCULAR; INTRAVENOUS at 17:07

## 2019-01-01 RX ADMIN — MORPHINE SULFATE 4 MG: 4 INJECTION, SOLUTION INTRAMUSCULAR; INTRAVENOUS at 11:35

## 2019-01-01 RX ADMIN — MINERAL OIL, PETROLATUM: 425; 568 OINTMENT OPHTHALMIC at 20:58

## 2019-01-01 RX ADMIN — MORPHINE SULFATE 4 MG: 4 INJECTION, SOLUTION INTRAMUSCULAR; INTRAVENOUS at 08:56

## 2019-01-01 RX ADMIN — MORPHINE SULFATE 4 MG: 4 INJECTION, SOLUTION INTRAMUSCULAR; INTRAVENOUS at 07:38

## 2019-01-01 RX ADMIN — GLYCOPYRROLATE 0.2 MG: 0.2 INJECTION INTRAMUSCULAR; INTRAVENOUS at 21:23

## 2019-01-01 RX ADMIN — MORPHINE SULFATE 4 MG: 4 INJECTION, SOLUTION INTRAMUSCULAR; INTRAVENOUS at 08:27

## 2019-01-01 RX ADMIN — MORPHINE SULFATE 4 MG: 4 INJECTION, SOLUTION INTRAMUSCULAR; INTRAVENOUS at 06:09

## 2019-01-01 RX ADMIN — GLYCOPYRROLATE 0.2 MG: 0.2 INJECTION INTRAMUSCULAR; INTRAVENOUS at 17:38

## 2019-01-01 RX ADMIN — MORPHINE SULFATE 4 MG: 4 INJECTION, SOLUTION INTRAMUSCULAR; INTRAVENOUS at 20:58

## 2019-01-01 RX ADMIN — GLYCOPYRROLATE 0.2 MG: 0.2 INJECTION INTRAMUSCULAR; INTRAVENOUS at 21:13

## 2019-01-01 RX ADMIN — MORPHINE SULFATE 2 MG: 4 INJECTION, SOLUTION INTRAMUSCULAR; INTRAVENOUS at 06:47

## 2019-01-01 RX ADMIN — MORPHINE SULFATE 2 MG: 4 INJECTION, SOLUTION INTRAMUSCULAR; INTRAVENOUS at 23:32

## 2019-01-01 RX ADMIN — MINERAL OIL, PETROLATUM: 425; 568 OINTMENT OPHTHALMIC at 22:00

## 2019-01-01 RX ADMIN — MORPHINE SULFATE 4 MG: 4 INJECTION, SOLUTION INTRAMUSCULAR; INTRAVENOUS at 15:18

## 2019-01-01 RX ADMIN — MORPHINE SULFATE 4 MG: 4 INJECTION, SOLUTION INTRAMUSCULAR; INTRAVENOUS at 01:29

## 2019-01-01 RX ADMIN — MORPHINE SULFATE 4 MG: 4 INJECTION, SOLUTION INTRAMUSCULAR; INTRAVENOUS at 12:39

## 2019-01-01 RX ADMIN — MORPHINE SULFATE 4 MG: 4 INJECTION, SOLUTION INTRAMUSCULAR; INTRAVENOUS at 15:20

## 2019-01-01 RX ADMIN — GLYCOPYRROLATE 0.2 MG: 0.2 INJECTION INTRAMUSCULAR; INTRAVENOUS at 08:37

## 2019-01-01 RX ADMIN — MORPHINE SULFATE 4 MG: 4 INJECTION, SOLUTION INTRAMUSCULAR; INTRAVENOUS at 15:24

## 2019-01-01 RX ADMIN — MORPHINE SULFATE 2 MG: 4 INJECTION, SOLUTION INTRAMUSCULAR; INTRAVENOUS at 05:12

## 2019-01-01 RX ADMIN — GLYCOPYRROLATE 0.2 MG: 0.2 INJECTION INTRAMUSCULAR; INTRAVENOUS at 13:20

## 2019-01-01 RX ADMIN — MORPHINE SULFATE 4 MG: 4 INJECTION, SOLUTION INTRAMUSCULAR; INTRAVENOUS at 11:57

## 2019-01-01 RX ADMIN — GLYCOPYRROLATE 0.2 MG: 0.2 INJECTION INTRAMUSCULAR; INTRAVENOUS at 12:39

## 2019-01-01 RX ADMIN — MORPHINE SULFATE 10 MG: 20 SOLUTION ORAL at 00:44

## 2019-01-01 RX ADMIN — GLYCOPYRROLATE 0.2 MG: 0.2 INJECTION INTRAMUSCULAR; INTRAVENOUS at 01:29

## 2019-01-01 RX ADMIN — MORPHINE SULFATE 6 MG: 4 INJECTION, SOLUTION INTRAMUSCULAR; INTRAVENOUS at 21:13

## 2019-01-01 RX ADMIN — MINERAL OIL, PETROLATUM: 425; 568 OINTMENT OPHTHALMIC at 21:13

## 2019-01-01 RX ADMIN — GLYCOPYRROLATE 0.2 MG: 0.2 INJECTION INTRAMUSCULAR; INTRAVENOUS at 13:11

## 2019-01-01 RX ADMIN — GLYCOPYRROLATE 0.2 MG: 0.2 INJECTION INTRAMUSCULAR; INTRAVENOUS at 01:07

## 2019-01-01 RX ADMIN — GLYCOPYRROLATE 0.2 MG: 0.2 INJECTION INTRAMUSCULAR; INTRAVENOUS at 21:03

## 2019-01-01 RX ADMIN — MORPHINE SULFATE 4 MG: 4 INJECTION, SOLUTION INTRAMUSCULAR; INTRAVENOUS at 17:51

## 2019-01-01 RX ADMIN — MORPHINE SULFATE 3 MG: 4 INJECTION, SOLUTION INTRAMUSCULAR; INTRAVENOUS at 09:12

## 2019-01-01 RX ADMIN — GLYCOPYRROLATE 0.2 MG: 0.2 INJECTION INTRAMUSCULAR; INTRAVENOUS at 09:00

## 2019-01-01 RX ADMIN — MORPHINE SULFATE 6 MG: 4 INJECTION, SOLUTION INTRAMUSCULAR; INTRAVENOUS at 05:22

## 2019-01-01 RX ADMIN — MINERAL OIL, PETROLATUM: 425; 568 OINTMENT OPHTHALMIC at 08:39

## 2019-01-01 RX ADMIN — MORPHINE SULFATE 3 MG: 4 INJECTION, SOLUTION INTRAMUSCULAR; INTRAVENOUS at 19:04

## 2019-01-01 RX ADMIN — GLYCOPYRROLATE 0.2 MG: 0.2 INJECTION INTRAMUSCULAR; INTRAVENOUS at 05:06

## 2019-01-01 RX ADMIN — MORPHINE SULFATE 4 MG: 4 INJECTION, SOLUTION INTRAMUSCULAR; INTRAVENOUS at 10:36

## 2019-01-01 RX ADMIN — MORPHINE SULFATE 4 MG: 4 INJECTION, SOLUTION INTRAMUSCULAR; INTRAVENOUS at 00:35

## 2019-01-01 RX ADMIN — MORPHINE SULFATE 2 MG: 4 INJECTION, SOLUTION INTRAMUSCULAR; INTRAVENOUS at 15:20

## 2019-01-01 RX ADMIN — MORPHINE SULFATE 6 MG: 4 INJECTION, SOLUTION INTRAMUSCULAR; INTRAVENOUS at 13:11

## 2019-01-01 RX ADMIN — MINERAL OIL, PETROLATUM: 425; 568 OINTMENT OPHTHALMIC at 21:43

## 2019-01-01 RX ADMIN — MORPHINE SULFATE 2 MG: 4 INJECTION, SOLUTION INTRAMUSCULAR; INTRAVENOUS at 18:24

## 2019-01-01 RX ADMIN — GLYCOPYRROLATE 0.2 MG: 0.2 INJECTION INTRAMUSCULAR; INTRAVENOUS at 05:14

## 2019-01-01 RX ADMIN — MORPHINE SULFATE 4 MG: 4 INJECTION, SOLUTION INTRAMUSCULAR; INTRAVENOUS at 23:22

## 2019-01-01 RX ADMIN — GLYCOPYRROLATE 0.2 MG: 0.2 INJECTION INTRAMUSCULAR; INTRAVENOUS at 20:40

## 2019-01-01 RX ADMIN — MORPHINE SULFATE 3 MG: 4 INJECTION, SOLUTION INTRAMUSCULAR; INTRAVENOUS at 17:36

## 2019-01-01 RX ADMIN — MORPHINE SULFATE 4 MG: 4 INJECTION, SOLUTION INTRAMUSCULAR; INTRAVENOUS at 03:04

## 2019-01-01 RX ADMIN — MORPHINE SULFATE 2 MG: 4 INJECTION, SOLUTION INTRAMUSCULAR; INTRAVENOUS at 21:01

## 2019-01-01 RX ADMIN — MORPHINE SULFATE 2 MG: 4 INJECTION, SOLUTION INTRAMUSCULAR; INTRAVENOUS at 05:00

## 2019-01-01 RX ADMIN — MORPHINE SULFATE 6 MG: 4 INJECTION, SOLUTION INTRAMUSCULAR; INTRAVENOUS at 21:22

## 2019-01-01 RX ADMIN — MORPHINE SULFATE 4 MG: 4 INJECTION, SOLUTION INTRAMUSCULAR; INTRAVENOUS at 17:17

## 2019-01-01 RX ADMIN — MORPHINE SULFATE 4 MG: 4 INJECTION, SOLUTION INTRAMUSCULAR; INTRAVENOUS at 03:55

## 2019-01-01 RX ADMIN — MINERAL OIL, PETROLATUM: 425; 568 OINTMENT OPHTHALMIC at 21:18

## 2019-01-01 RX ADMIN — MORPHINE SULFATE 4 MG: 4 INJECTION, SOLUTION INTRAMUSCULAR; INTRAVENOUS at 09:02

## 2019-01-01 RX ADMIN — MINERAL OIL, PETROLATUM: 425; 568 OINTMENT OPHTHALMIC at 09:01

## 2019-01-01 RX ADMIN — MORPHINE SULFATE 4 MG: 4 INJECTION, SOLUTION INTRAMUSCULAR; INTRAVENOUS at 10:03

## 2019-01-01 RX ADMIN — MINERAL OIL, PETROLATUM: 425; 568 OINTMENT OPHTHALMIC at 13:45

## 2019-01-01 RX ADMIN — MORPHINE SULFATE 2 MG: 4 INJECTION, SOLUTION INTRAMUSCULAR; INTRAVENOUS at 19:35

## 2019-01-01 RX ADMIN — MORPHINE SULFATE 4 MG: 4 INJECTION, SOLUTION INTRAMUSCULAR; INTRAVENOUS at 21:51

## 2019-01-01 RX ADMIN — MORPHINE SULFATE 2 MG: 4 INJECTION, SOLUTION INTRAMUSCULAR; INTRAVENOUS at 13:04

## 2019-01-01 RX ADMIN — MORPHINE SULFATE 4 MG: 4 INJECTION, SOLUTION INTRAMUSCULAR; INTRAVENOUS at 11:02

## 2019-01-01 RX ADMIN — MORPHINE SULFATE 6 MG: 4 INJECTION, SOLUTION INTRAMUSCULAR; INTRAVENOUS at 01:06

## 2019-01-01 RX ADMIN — GLYCOPYRROLATE 0.2 MG: 0.2 INJECTION INTRAMUSCULAR; INTRAVENOUS at 18:02

## 2019-01-01 RX ADMIN — MINERAL OIL, PETROLATUM: 425; 568 OINTMENT OPHTHALMIC at 09:15

## 2019-01-01 RX ADMIN — GLYCOPYRROLATE 0.2 MG: 0.2 INJECTION INTRAMUSCULAR; INTRAVENOUS at 21:57

## 2019-01-01 RX ADMIN — GLYCOPYRROLATE 0.2 MG: 0.2 INJECTION INTRAMUSCULAR; INTRAVENOUS at 09:12

## 2019-01-01 RX ADMIN — MORPHINE SULFATE 4 MG: 4 INJECTION, SOLUTION INTRAMUSCULAR; INTRAVENOUS at 06:54

## 2019-01-01 RX ADMIN — GLYCOPYRROLATE 0.2 MG: 0.2 INJECTION INTRAMUSCULAR; INTRAVENOUS at 05:02

## 2019-01-01 RX ADMIN — MORPHINE SULFATE 4 MG: 4 INJECTION, SOLUTION INTRAMUSCULAR; INTRAVENOUS at 17:19

## 2019-01-01 RX ADMIN — MORPHINE SULFATE 4 MG: 4 INJECTION, SOLUTION INTRAMUSCULAR; INTRAVENOUS at 01:47

## 2019-01-01 RX ADMIN — MORPHINE SULFATE 4 MG: 4 INJECTION, SOLUTION INTRAMUSCULAR; INTRAVENOUS at 12:22

## 2019-01-01 RX ADMIN — GLYCOPYRROLATE 0.2 MG: 0.2 INJECTION INTRAMUSCULAR; INTRAVENOUS at 06:09

## 2019-01-01 RX ADMIN — AMIODARONE HYDROCHLORIDE 150 MG: 150 INJECTION, SOLUTION INTRAVENOUS at 19:25

## 2019-01-01 RX ADMIN — MINERAL OIL, PETROLATUM: 425; 568 OINTMENT OPHTHALMIC at 08:27

## 2019-01-01 RX ADMIN — MINERAL OIL, PETROLATUM: 425; 568 OINTMENT OPHTHALMIC at 09:02

## 2019-01-01 RX ADMIN — MINERAL OIL, PETROLATUM: 425; 568 OINTMENT OPHTHALMIC at 08:03

## 2019-01-01 RX ADMIN — GLYCOPYRROLATE 0.2 MG: 0.2 INJECTION INTRAMUSCULAR; INTRAVENOUS at 13:04

## 2019-01-01 RX ADMIN — MORPHINE SULFATE 6 MG: 4 INJECTION, SOLUTION INTRAMUSCULAR; INTRAVENOUS at 12:52

## 2019-01-01 RX ADMIN — MINERAL OIL, PETROLATUM: 425; 568 OINTMENT OPHTHALMIC at 08:29

## 2019-01-01 RX ADMIN — MORPHINE SULFATE 6 MG: 4 INJECTION, SOLUTION INTRAMUSCULAR; INTRAVENOUS at 22:54

## 2019-01-01 RX ADMIN — MORPHINE SULFATE 2 MG: 4 INJECTION, SOLUTION INTRAMUSCULAR; INTRAVENOUS at 12:48

## 2019-01-01 RX ADMIN — GLYCOPYRROLATE 0.2 MG: 0.2 INJECTION INTRAMUSCULAR; INTRAVENOUS at 15:06

## 2019-01-01 RX ADMIN — MORPHINE SULFATE 4 MG: 4 INJECTION, SOLUTION INTRAMUSCULAR; INTRAVENOUS at 20:05

## 2019-01-01 RX ADMIN — MORPHINE SULFATE 2 MG: 4 INJECTION, SOLUTION INTRAMUSCULAR; INTRAVENOUS at 05:01

## 2019-01-01 RX ADMIN — MORPHINE SULFATE 3 MG: 4 INJECTION, SOLUTION INTRAMUSCULAR; INTRAVENOUS at 15:40

## 2019-01-01 RX ADMIN — MORPHINE SULFATE 6 MG: 4 INJECTION, SOLUTION INTRAMUSCULAR; INTRAVENOUS at 23:16

## 2019-01-01 RX ADMIN — MORPHINE SULFATE 2 MG: 4 INJECTION, SOLUTION INTRAMUSCULAR; INTRAVENOUS at 20:15

## 2019-01-01 RX ADMIN — MORPHINE SULFATE 4 MG: 4 INJECTION, SOLUTION INTRAMUSCULAR; INTRAVENOUS at 18:36

## 2019-01-01 RX ADMIN — MORPHINE SULFATE 4 MG: 4 INJECTION, SOLUTION INTRAMUSCULAR; INTRAVENOUS at 01:32

## 2019-01-01 RX ADMIN — SODIUM CHLORIDE 500 ML: 900 INJECTION, SOLUTION INTRAVENOUS at 17:53

## 2019-01-01 RX ADMIN — MORPHINE SULFATE 4 MG: 4 INJECTION, SOLUTION INTRAMUSCULAR; INTRAVENOUS at 13:08

## 2019-01-01 RX ADMIN — MORPHINE SULFATE 2 MG: 4 INJECTION, SOLUTION INTRAMUSCULAR; INTRAVENOUS at 10:31

## 2019-01-01 RX ADMIN — GLYCOPYRROLATE 0.2 MG: 0.2 INJECTION INTRAMUSCULAR; INTRAVENOUS at 01:41

## 2019-01-01 RX ADMIN — MORPHINE SULFATE 4 MG: 4 INJECTION, SOLUTION INTRAMUSCULAR; INTRAVENOUS at 23:39

## 2019-01-01 RX ADMIN — GLYCOPYRROLATE 0.2 MG: 0.2 INJECTION INTRAMUSCULAR; INTRAVENOUS at 01:09

## 2019-01-01 RX ADMIN — GLYCOPYRROLATE 0.2 MG: 0.2 INJECTION INTRAMUSCULAR; INTRAVENOUS at 08:39

## 2019-01-01 RX ADMIN — MORPHINE SULFATE 4 MG: 4 INJECTION, SOLUTION INTRAMUSCULAR; INTRAVENOUS at 08:55

## 2019-01-01 RX ADMIN — MORPHINE SULFATE 3 MG: 4 INJECTION, SOLUTION INTRAMUSCULAR; INTRAVENOUS at 10:33

## 2019-01-01 RX ADMIN — MORPHINE SULFATE 2 MG: 4 INJECTION, SOLUTION INTRAMUSCULAR; INTRAVENOUS at 14:28

## 2019-01-01 RX ADMIN — MORPHINE SULFATE 2 MG: 4 INJECTION, SOLUTION INTRAMUSCULAR; INTRAVENOUS at 03:30

## 2019-01-01 RX ADMIN — MORPHINE SULFATE 4 MG: 4 INJECTION, SOLUTION INTRAMUSCULAR; INTRAVENOUS at 17:06

## 2019-01-01 RX ADMIN — MORPHINE SULFATE 2 MG: 4 INJECTION, SOLUTION INTRAMUSCULAR; INTRAVENOUS at 16:39

## 2019-01-01 RX ADMIN — MORPHINE SULFATE 4 MG: 4 INJECTION, SOLUTION INTRAMUSCULAR; INTRAVENOUS at 13:04

## 2019-01-01 RX ADMIN — MORPHINE SULFATE 4 MG: 4 INJECTION, SOLUTION INTRAMUSCULAR; INTRAVENOUS at 23:28

## 2019-01-01 RX ADMIN — MORPHINE SULFATE 4 MG: 4 INJECTION, SOLUTION INTRAMUSCULAR; INTRAVENOUS at 08:25

## 2019-01-01 RX ADMIN — MORPHINE SULFATE 4 MG: 4 INJECTION, SOLUTION INTRAMUSCULAR; INTRAVENOUS at 19:52

## 2019-01-01 RX ADMIN — MORPHINE SULFATE 4 MG: 4 INJECTION, SOLUTION INTRAMUSCULAR; INTRAVENOUS at 23:02

## 2019-01-01 RX ADMIN — MORPHINE SULFATE 2 MG: 4 INJECTION, SOLUTION INTRAMUSCULAR; INTRAVENOUS at 20:40

## 2019-01-01 RX ADMIN — AMIODARONE HYDROCHLORIDE 0.5 MG/MIN: 150 INJECTION, SOLUTION INTRAVENOUS at 19:57

## 2019-01-01 RX ADMIN — MORPHINE SULFATE 4 MG: 4 INJECTION, SOLUTION INTRAMUSCULAR; INTRAVENOUS at 07:39

## 2019-01-01 RX ADMIN — GLYCOPYRROLATE 0.2 MG: 0.2 INJECTION INTRAMUSCULAR; INTRAVENOUS at 16:34

## 2019-01-01 RX ADMIN — GLYCOPYRROLATE 0.2 MG: 0.2 INJECTION INTRAMUSCULAR; INTRAVENOUS at 21:18

## 2019-01-01 RX ADMIN — MORPHINE SULFATE 4 MG: 4 INJECTION, SOLUTION INTRAMUSCULAR; INTRAVENOUS at 01:07

## 2019-01-01 RX ADMIN — MORPHINE SULFATE 2 MG: 4 INJECTION, SOLUTION INTRAMUSCULAR; INTRAVENOUS at 08:28

## 2019-01-01 RX ADMIN — MORPHINE SULFATE 4 MG: 4 INJECTION, SOLUTION INTRAMUSCULAR; INTRAVENOUS at 19:30

## 2019-01-01 RX ADMIN — MORPHINE SULFATE 2 MG: 4 INJECTION, SOLUTION INTRAMUSCULAR; INTRAVENOUS at 21:39

## 2019-01-01 RX ADMIN — ACETAMINOPHEN 650 MG: 650 SUPPOSITORY RECTAL at 17:52

## 2019-01-01 RX ADMIN — MORPHINE SULFATE 4 MG: 4 INJECTION, SOLUTION INTRAMUSCULAR; INTRAVENOUS at 14:46

## 2019-01-01 RX ADMIN — MORPHINE SULFATE 4 MG: 4 INJECTION, SOLUTION INTRAMUSCULAR; INTRAVENOUS at 06:29

## 2019-01-01 RX ADMIN — MORPHINE SULFATE 6 MG: 4 INJECTION, SOLUTION INTRAMUSCULAR; INTRAVENOUS at 05:20

## 2019-01-01 RX ADMIN — GLYCOPYRROLATE 0.2 MG: 0.2 INJECTION INTRAMUSCULAR; INTRAVENOUS at 09:19

## 2019-01-01 RX ADMIN — MINERAL OIL, PETROLATUM: 425; 568 OINTMENT OPHTHALMIC at 22:54

## 2019-01-01 RX ADMIN — GLYCOPYRROLATE 0.2 MG: 0.2 INJECTION INTRAMUSCULAR; INTRAVENOUS at 05:12

## 2019-01-01 RX ADMIN — GLYCOPYRROLATE 0.2 MG: 0.2 INJECTION INTRAMUSCULAR; INTRAVENOUS at 20:58

## 2019-01-01 RX ADMIN — MORPHINE SULFATE 4 MG: 4 INJECTION, SOLUTION INTRAMUSCULAR; INTRAVENOUS at 21:06

## 2019-01-01 RX ADMIN — MORPHINE SULFATE 2 MG: 4 INJECTION, SOLUTION INTRAMUSCULAR; INTRAVENOUS at 23:40

## 2019-01-01 RX ADMIN — MORPHINE SULFATE 4 MG: 4 INJECTION, SOLUTION INTRAMUSCULAR; INTRAVENOUS at 14:11

## 2019-01-01 RX ADMIN — GLYCOPYRROLATE 0.2 MG: 0.2 INJECTION INTRAMUSCULAR; INTRAVENOUS at 17:51

## 2019-01-01 RX ADMIN — MINERAL OIL, PETROLATUM: 425; 568 OINTMENT OPHTHALMIC at 21:10

## 2019-01-01 RX ADMIN — MORPHINE SULFATE 6 MG: 4 INJECTION, SOLUTION INTRAMUSCULAR; INTRAVENOUS at 15:17

## 2019-01-01 RX ADMIN — MORPHINE SULFATE 4 MG: 4 INJECTION, SOLUTION INTRAMUSCULAR; INTRAVENOUS at 01:41

## 2019-01-01 RX ADMIN — MORPHINE SULFATE 4 MG: 4 INJECTION, SOLUTION INTRAMUSCULAR; INTRAVENOUS at 19:25

## 2019-01-01 RX ADMIN — GLYCOPYRROLATE 0.2 MG: 0.2 INJECTION INTRAMUSCULAR; INTRAVENOUS at 05:53

## 2019-01-01 RX ADMIN — GLYCOPYRROLATE 0.2 MG: 0.2 INJECTION INTRAMUSCULAR; INTRAVENOUS at 20:15

## 2019-01-01 RX ADMIN — MORPHINE SULFATE 6 MG: 4 INJECTION, SOLUTION INTRAMUSCULAR; INTRAVENOUS at 19:40

## 2019-01-01 RX ADMIN — MORPHINE SULFATE 4 MG: 4 INJECTION, SOLUTION INTRAMUSCULAR; INTRAVENOUS at 08:39

## 2019-01-01 RX ADMIN — GLYCOPYRROLATE 0.2 MG: 0.2 INJECTION INTRAMUSCULAR; INTRAVENOUS at 17:19

## 2019-01-01 RX ADMIN — WATER 2 G: 1 INJECTION INTRAMUSCULAR; INTRAVENOUS; SUBCUTANEOUS at 17:50

## 2019-01-01 RX ADMIN — MORPHINE SULFATE 3 MG: 4 INJECTION, SOLUTION INTRAMUSCULAR; INTRAVENOUS at 01:08

## 2019-01-01 RX ADMIN — MORPHINE SULFATE 3 MG: 4 INJECTION, SOLUTION INTRAMUSCULAR; INTRAVENOUS at 06:17

## 2019-01-01 RX ADMIN — MORPHINE SULFATE 6 MG: 4 INJECTION, SOLUTION INTRAMUSCULAR; INTRAVENOUS at 07:03

## 2019-01-01 RX ADMIN — GLYCOPYRROLATE 0.2 MG: 0.2 INJECTION INTRAMUSCULAR; INTRAVENOUS at 16:57

## 2019-01-01 RX ADMIN — GLYCOPYRROLATE 0.2 MG: 0.2 INJECTION INTRAMUSCULAR; INTRAVENOUS at 05:17

## 2019-01-01 RX ADMIN — GLYCOPYRROLATE 0.2 MG: 0.2 INJECTION INTRAMUSCULAR; INTRAVENOUS at 13:08

## 2019-01-01 RX ADMIN — GLYCOPYRROLATE 0.2 MG: 0.2 INJECTION INTRAMUSCULAR; INTRAVENOUS at 04:06

## 2019-01-01 RX ADMIN — MINERAL OIL, PETROLATUM: 425; 568 OINTMENT OPHTHALMIC at 21:06

## 2019-01-01 RX ADMIN — GLYCOPYRROLATE 0.2 MG: 0.2 INJECTION INTRAMUSCULAR; INTRAVENOUS at 08:55

## 2019-01-01 RX ADMIN — MORPHINE SULFATE 4 MG: 4 INJECTION, SOLUTION INTRAMUSCULAR; INTRAVENOUS at 21:39

## 2019-01-01 RX ADMIN — MORPHINE SULFATE 2 MG: 4 INJECTION, SOLUTION INTRAMUSCULAR; INTRAVENOUS at 10:04

## 2019-01-01 RX ADMIN — GLYCOPYRROLATE 0.2 MG: 0.2 INJECTION INTRAMUSCULAR; INTRAVENOUS at 17:07

## 2019-01-01 RX ADMIN — GLYCOPYRROLATE 0.2 MG: 0.2 INJECTION INTRAMUSCULAR; INTRAVENOUS at 23:32

## 2019-01-01 RX ADMIN — GLYCOPYRROLATE 0.2 MG: 0.2 INJECTION INTRAMUSCULAR; INTRAVENOUS at 21:39

## 2019-01-01 RX ADMIN — GLYCOPYRROLATE 0.2 MG: 0.2 INJECTION INTRAMUSCULAR; INTRAVENOUS at 05:41

## 2019-01-01 RX ADMIN — GLYCOPYRROLATE 0.2 MG: 0.2 INJECTION INTRAMUSCULAR; INTRAVENOUS at 01:31

## 2019-01-01 RX ADMIN — MORPHINE SULFATE 4 MG: 4 INJECTION, SOLUTION INTRAMUSCULAR; INTRAVENOUS at 05:53

## 2019-01-01 RX ADMIN — GLYCOPYRROLATE 0.2 MG: 0.2 INJECTION INTRAMUSCULAR; INTRAVENOUS at 09:15

## 2019-01-01 RX ADMIN — GLYCOPYRROLATE 0.2 MG: 0.2 INJECTION INTRAMUSCULAR; INTRAVENOUS at 08:28

## 2019-01-01 RX ADMIN — MORPHINE SULFATE 4 MG: 4 INJECTION, SOLUTION INTRAMUSCULAR; INTRAVENOUS at 16:34

## 2019-01-01 RX ADMIN — MORPHINE SULFATE 2 MG: 4 INJECTION, SOLUTION INTRAMUSCULAR; INTRAVENOUS at 01:31

## 2019-01-01 RX ADMIN — MORPHINE SULFATE 4 MG: 4 INJECTION, SOLUTION INTRAMUSCULAR; INTRAVENOUS at 09:00

## 2019-01-01 RX ADMIN — MORPHINE SULFATE 6 MG: 4 INJECTION, SOLUTION INTRAMUSCULAR; INTRAVENOUS at 15:13

## 2019-01-01 RX ADMIN — MORPHINE SULFATE 10 MG: 20 SOLUTION ORAL at 05:02

## 2019-01-01 RX ADMIN — MORPHINE SULFATE 3 MG: 4 INJECTION, SOLUTION INTRAMUSCULAR; INTRAVENOUS at 13:22

## 2019-01-01 RX ADMIN — MORPHINE SULFATE 4 MG: 4 INJECTION, SOLUTION INTRAMUSCULAR; INTRAVENOUS at 18:52

## 2019-01-01 RX ADMIN — MORPHINE SULFATE 4 MG: 4 INJECTION, SOLUTION INTRAMUSCULAR; INTRAVENOUS at 18:59

## 2019-01-01 RX ADMIN — MORPHINE SULFATE 2 MG: 4 INJECTION, SOLUTION INTRAMUSCULAR; INTRAVENOUS at 21:56

## 2019-01-01 RX ADMIN — KETOROLAC TROMETHAMINE 15 MG: 30 INJECTION, SOLUTION INTRAMUSCULAR at 20:42

## 2019-01-01 RX ADMIN — MORPHINE SULFATE 4 MG: 4 INJECTION, SOLUTION INTRAMUSCULAR; INTRAVENOUS at 16:45

## 2019-01-01 RX ADMIN — GLYCOPYRROLATE 0.2 MG: 0.2 INJECTION INTRAMUSCULAR; INTRAVENOUS at 03:56

## 2019-01-01 RX ADMIN — MORPHINE SULFATE 4 MG: 4 INJECTION, SOLUTION INTRAMUSCULAR; INTRAVENOUS at 07:32

## 2019-01-01 RX ADMIN — LORAZEPAM 1 MG: 2 SOLUTION, CONCENTRATE ORAL at 05:43

## 2019-01-01 RX ADMIN — Medication 10 ML: at 10:30

## 2019-01-01 RX ADMIN — MORPHINE SULFATE 4 MG: 4 INJECTION, SOLUTION INTRAMUSCULAR; INTRAVENOUS at 06:46

## 2019-01-01 RX ADMIN — MINERAL OIL, PETROLATUM: 425; 568 OINTMENT OPHTHALMIC at 20:40

## 2019-01-01 RX ADMIN — SODIUM CHLORIDE 914 ML: 900 INJECTION, SOLUTION INTRAVENOUS at 18:16

## 2019-01-01 RX ADMIN — MORPHINE SULFATE 4 MG: 4 INJECTION, SOLUTION INTRAMUSCULAR; INTRAVENOUS at 14:29

## 2019-01-01 RX ADMIN — MORPHINE SULFATE 2 MG: 4 INJECTION, SOLUTION INTRAMUSCULAR; INTRAVENOUS at 19:37

## 2019-01-01 RX ADMIN — MORPHINE SULFATE 4 MG: 4 INJECTION, SOLUTION INTRAMUSCULAR; INTRAVENOUS at 15:02

## 2019-01-01 RX ADMIN — GLYCOPYRROLATE 0.2 MG: 0.2 INJECTION INTRAMUSCULAR; INTRAVENOUS at 21:42

## 2019-01-01 RX ADMIN — GLYCOPYRROLATE 0.2 MG: 0.2 INJECTION INTRAMUSCULAR; INTRAVENOUS at 17:02

## 2019-01-01 RX ADMIN — MORPHINE SULFATE 4 MG: 4 INJECTION, SOLUTION INTRAMUSCULAR; INTRAVENOUS at 23:53

## 2019-01-01 RX ADMIN — MORPHINE SULFATE 6 MG: 4 INJECTION, SOLUTION INTRAMUSCULAR; INTRAVENOUS at 01:09

## 2019-01-01 RX ADMIN — MORPHINE SULFATE 2 MG: 4 INJECTION, SOLUTION INTRAMUSCULAR; INTRAVENOUS at 18:03

## 2019-01-01 RX ADMIN — MORPHINE SULFATE 2 MG: 4 INJECTION, SOLUTION INTRAMUSCULAR; INTRAVENOUS at 15:06

## 2019-01-01 RX ADMIN — GLYCOPYRROLATE 0.2 MG: 0.2 INJECTION INTRAMUSCULAR; INTRAVENOUS at 16:11

## 2019-01-01 RX ADMIN — MORPHINE SULFATE 6 MG: 4 INJECTION, SOLUTION INTRAMUSCULAR; INTRAVENOUS at 03:23

## 2019-01-01 RX ADMIN — MINERAL OIL, PETROLATUM: 425; 568 OINTMENT OPHTHALMIC at 09:07

## 2019-01-01 RX ADMIN — MORPHINE SULFATE 4 MG: 4 INJECTION, SOLUTION INTRAMUSCULAR; INTRAVENOUS at 21:43

## 2019-01-01 RX ADMIN — MORPHINE SULFATE 2 MG: 4 INJECTION, SOLUTION INTRAMUSCULAR; INTRAVENOUS at 11:18

## 2019-01-01 RX ADMIN — GLYCOPYRROLATE 0.2 MG: 0.2 INJECTION INTRAMUSCULAR; INTRAVENOUS at 09:03

## 2019-01-01 RX ADMIN — MORPHINE SULFATE 4 MG: 4 INJECTION, SOLUTION INTRAMUSCULAR; INTRAVENOUS at 07:03

## 2019-01-01 RX ADMIN — GLYCOPYRROLATE 0.2 MG: 0.2 INJECTION INTRAMUSCULAR; INTRAVENOUS at 09:23

## 2019-01-01 RX ADMIN — MORPHINE SULFATE 4 MG: 4 INJECTION, SOLUTION INTRAMUSCULAR; INTRAVENOUS at 02:28

## 2019-01-01 RX ADMIN — MINERAL OIL, PETROLATUM: 425; 568 OINTMENT OPHTHALMIC at 08:37

## 2019-01-01 RX ADMIN — GLYCOPYRROLATE 0.2 MG: 0.2 INJECTION INTRAMUSCULAR; INTRAVENOUS at 01:28

## 2019-01-01 RX ADMIN — MORPHINE SULFATE 4 MG: 4 INJECTION, SOLUTION INTRAMUSCULAR; INTRAVENOUS at 05:51

## 2019-01-01 RX ADMIN — GLYCOPYRROLATE 0.2 MG: 0.2 INJECTION INTRAMUSCULAR; INTRAVENOUS at 21:29

## 2019-01-01 RX ADMIN — MORPHINE SULFATE 4 MG: 4 INJECTION, SOLUTION INTRAMUSCULAR; INTRAVENOUS at 17:38

## 2019-01-01 RX ADMIN — MINERAL OIL, PETROLATUM: 425; 568 OINTMENT OPHTHALMIC at 09:28

## 2019-01-01 RX ADMIN — GLYCOPYRROLATE 0.2 MG: 0.2 INJECTION INTRAMUSCULAR; INTRAVENOUS at 17:32

## 2019-01-01 RX ADMIN — MINERAL OIL, PETROLATUM: 425; 568 OINTMENT OPHTHALMIC at 20:05

## 2019-01-01 RX ADMIN — MORPHINE SULFATE 4 MG: 4 INJECTION, SOLUTION INTRAMUSCULAR; INTRAVENOUS at 05:14

## 2019-01-01 RX ADMIN — GLYCOPYRROLATE 0.2 MG: 0.2 INJECTION INTRAMUSCULAR; INTRAVENOUS at 12:38

## 2019-01-01 RX ADMIN — MORPHINE SULFATE 10 MG: 20 SOLUTION ORAL at 22:10

## 2019-01-01 RX ADMIN — GLYCOPYRROLATE 0.2 MG: 0.2 INJECTION INTRAMUSCULAR; INTRAVENOUS at 17:20

## 2019-01-01 RX ADMIN — MORPHINE SULFATE 4 MG: 4 INJECTION, SOLUTION INTRAMUSCULAR; INTRAVENOUS at 13:20

## 2019-01-01 RX ADMIN — GLYCOPYRROLATE 0.2 MG: 0.2 INJECTION INTRAMUSCULAR; INTRAVENOUS at 04:14

## 2019-01-01 RX ADMIN — GLYCOPYRROLATE 0.2 MG: 0.2 INJECTION INTRAMUSCULAR; INTRAVENOUS at 01:21

## 2019-01-01 RX ADMIN — MORPHINE SULFATE 2 MG: 4 INJECTION, SOLUTION INTRAMUSCULAR; INTRAVENOUS at 15:08

## 2019-01-01 RX ADMIN — MORPHINE SULFATE 4 MG: 4 INJECTION, SOLUTION INTRAMUSCULAR; INTRAVENOUS at 18:12

## 2019-01-01 RX ADMIN — MORPHINE SULFATE 2 MG: 4 INJECTION, SOLUTION INTRAMUSCULAR; INTRAVENOUS at 19:52

## 2019-01-01 RX ADMIN — GLYCOPYRROLATE 0.2 MG: 0.2 INJECTION INTRAMUSCULAR; INTRAVENOUS at 01:08

## 2019-01-01 RX ADMIN — GLYCOPYRROLATE 0.2 MG: 0.2 INJECTION INTRAMUSCULAR; INTRAVENOUS at 12:22

## 2019-01-01 RX ADMIN — MORPHINE SULFATE 4 MG: 4 INJECTION, SOLUTION INTRAMUSCULAR; INTRAVENOUS at 03:30

## 2019-01-01 RX ADMIN — MORPHINE SULFATE 2 MG: 4 INJECTION, SOLUTION INTRAMUSCULAR; INTRAVENOUS at 09:23

## 2019-01-01 RX ADMIN — GLYCOPYRROLATE 0.2 MG: 0.2 INJECTION INTRAMUSCULAR; INTRAVENOUS at 16:45

## 2019-01-01 RX ADMIN — GLYCOPYRROLATE 0.2 MG: 0.2 INJECTION INTRAMUSCULAR; INTRAVENOUS at 05:01

## 2019-01-01 RX ADMIN — MORPHINE SULFATE 4 MG: 4 INJECTION, SOLUTION INTRAMUSCULAR; INTRAVENOUS at 05:41

## 2019-01-01 RX ADMIN — MORPHINE SULFATE 6 MG: 4 INJECTION, SOLUTION INTRAMUSCULAR; INTRAVENOUS at 11:08

## 2019-01-01 RX ADMIN — GLYCOPYRROLATE 0.2 MG: 0.2 INJECTION INTRAMUSCULAR; INTRAVENOUS at 13:03

## 2019-01-01 RX ADMIN — MORPHINE SULFATE 2 MG: 4 INJECTION, SOLUTION INTRAMUSCULAR; INTRAVENOUS at 08:29

## 2019-01-01 RX ADMIN — MORPHINE SULFATE 4 MG: 4 INJECTION, SOLUTION INTRAMUSCULAR; INTRAVENOUS at 07:00

## 2019-01-01 RX ADMIN — MORPHINE SULFATE 4 MG: 4 INJECTION, SOLUTION INTRAMUSCULAR; INTRAVENOUS at 13:00

## 2019-01-01 RX ADMIN — MORPHINE SULFATE 4 MG: 4 INJECTION, SOLUTION INTRAMUSCULAR; INTRAVENOUS at 21:10

## 2019-01-01 RX ADMIN — MORPHINE SULFATE 4 MG: 4 INJECTION, SOLUTION INTRAMUSCULAR; INTRAVENOUS at 03:56

## 2019-01-01 RX ADMIN — MORPHINE SULFATE 2 MG: 4 INJECTION, SOLUTION INTRAMUSCULAR; INTRAVENOUS at 05:26

## 2019-01-01 RX ADMIN — MORPHINE SULFATE 3 MG: 4 INJECTION, SOLUTION INTRAMUSCULAR; INTRAVENOUS at 23:45

## 2019-01-01 RX ADMIN — MINERAL OIL, PETROLATUM: 425; 568 OINTMENT OPHTHALMIC at 21:03

## 2019-01-01 RX ADMIN — GLYCOPYRROLATE 0.2 MG: 0.2 INJECTION INTRAMUSCULAR; INTRAVENOUS at 01:33

## 2019-01-01 RX ADMIN — MINERAL OIL, PETROLATUM: 425; 568 OINTMENT OPHTHALMIC at 08:59

## 2019-01-01 RX ADMIN — GLYCOPYRROLATE 0.2 MG: 0.2 INJECTION INTRAMUSCULAR; INTRAVENOUS at 12:35

## 2019-01-01 RX ADMIN — MORPHINE SULFATE 4 MG: 4 INJECTION, SOLUTION INTRAMUSCULAR; INTRAVENOUS at 21:03

## 2019-01-01 RX ADMIN — MORPHINE SULFATE 2 MG: 4 INJECTION, SOLUTION INTRAMUSCULAR; INTRAVENOUS at 01:33

## 2019-01-01 RX ADMIN — GLYCOPYRROLATE 0.2 MG: 0.2 INJECTION INTRAMUSCULAR; INTRAVENOUS at 05:20

## 2019-01-01 RX ADMIN — MORPHINE SULFATE 2 MG: 4 INJECTION, SOLUTION INTRAMUSCULAR; INTRAVENOUS at 06:42

## 2019-01-01 RX ADMIN — MORPHINE SULFATE 4 MG: 4 INJECTION, SOLUTION INTRAMUSCULAR; INTRAVENOUS at 11:11

## 2019-01-01 RX ADMIN — GLYCOPYRROLATE 0.2 MG: 0.2 INJECTION INTRAMUSCULAR; INTRAVENOUS at 04:49

## 2019-01-01 RX ADMIN — MORPHINE SULFATE 4 MG: 4 INJECTION, SOLUTION INTRAMUSCULAR; INTRAVENOUS at 05:06

## 2019-01-01 RX ADMIN — MORPHINE SULFATE 4 MG: 4 INJECTION, SOLUTION INTRAMUSCULAR; INTRAVENOUS at 09:19

## 2019-01-01 RX ADMIN — GLYCOPYRROLATE 0.2 MG: 0.2 INJECTION INTRAMUSCULAR; INTRAVENOUS at 05:00

## 2019-01-01 RX ADMIN — MORPHINE SULFATE 4 MG: 4 INJECTION, SOLUTION INTRAMUSCULAR; INTRAVENOUS at 11:26

## 2019-01-01 RX ADMIN — MORPHINE SULFATE 6 MG: 4 INJECTION, SOLUTION INTRAMUSCULAR; INTRAVENOUS at 06:53

## 2019-01-01 RX ADMIN — GLYCOPYRROLATE 0.2 MG: 0.2 INJECTION INTRAMUSCULAR; INTRAVENOUS at 08:02

## 2019-01-01 RX ADMIN — MORPHINE SULFATE 2 MG: 4 INJECTION, SOLUTION INTRAMUSCULAR; INTRAVENOUS at 12:36

## 2019-01-01 RX ADMIN — GLYCOPYRROLATE 0.2 MG: 0.2 INJECTION, SOLUTION INTRAMUSCULAR; INTRAVENOUS at 12:48

## 2019-01-01 RX ADMIN — MORPHINE SULFATE 2 MG: 4 INJECTION, SOLUTION INTRAMUSCULAR; INTRAVENOUS at 03:03

## 2019-01-01 RX ADMIN — MORPHINE SULFATE 4 MG: 4 INJECTION, SOLUTION INTRAMUSCULAR; INTRAVENOUS at 03:11

## 2019-01-01 RX ADMIN — MORPHINE SULFATE 3 MG: 4 INJECTION, SOLUTION INTRAMUSCULAR; INTRAVENOUS at 21:30

## 2019-01-01 RX ADMIN — MORPHINE SULFATE 2 MG: 4 INJECTION, SOLUTION INTRAMUSCULAR; INTRAVENOUS at 03:08

## 2019-01-01 RX ADMIN — MORPHINE SULFATE 6 MG: 4 INJECTION, SOLUTION INTRAMUSCULAR; INTRAVENOUS at 18:58

## 2019-01-01 RX ADMIN — GLYCOPYRROLATE 0.2 MG: 0.2 INJECTION INTRAMUSCULAR; INTRAVENOUS at 01:06

## 2019-01-01 RX ADMIN — ALBUTEROL SULFATE 1 DOSE: 2.5 SOLUTION RESPIRATORY (INHALATION) at 17:10

## 2019-01-01 RX ADMIN — GLYCOPYRROLATE 0.2 MG: 0.2 INJECTION INTRAMUSCULAR; INTRAVENOUS at 01:47

## 2019-01-01 RX ADMIN — MINERAL OIL, PETROLATUM: 425; 568 OINTMENT OPHTHALMIC at 09:19

## 2019-01-01 RX ADMIN — MORPHINE SULFATE 4 MG: 4 INJECTION, SOLUTION INTRAMUSCULAR; INTRAVENOUS at 09:26

## 2019-01-01 RX ADMIN — MORPHINE SULFATE 6 MG: 4 INJECTION, SOLUTION INTRAMUSCULAR; INTRAVENOUS at 03:08

## 2019-01-01 RX ADMIN — MORPHINE SULFATE 4 MG: 4 INJECTION, SOLUTION INTRAMUSCULAR; INTRAVENOUS at 23:32

## 2019-01-01 RX ADMIN — MORPHINE SULFATE 4 MG: 4 INJECTION, SOLUTION INTRAMUSCULAR; INTRAVENOUS at 23:58

## 2019-01-01 RX ADMIN — MORPHINE SULFATE 2 MG: 4 INJECTION, SOLUTION INTRAMUSCULAR; INTRAVENOUS at 22:20

## 2019-01-01 RX ADMIN — GLYCOPYRROLATE 0.2 MG: 0.2 INJECTION INTRAMUSCULAR; INTRAVENOUS at 13:00

## 2019-01-01 RX ADMIN — GLYCOPYRROLATE 0.2 MG: 0.2 INJECTION INTRAMUSCULAR; INTRAVENOUS at 01:32

## 2019-01-01 RX ADMIN — MORPHINE SULFATE 4 MG: 4 INJECTION, SOLUTION INTRAMUSCULAR; INTRAVENOUS at 22:59

## 2019-01-01 RX ADMIN — MINERAL OIL, PETROLATUM: 425; 568 OINTMENT OPHTHALMIC at 08:25

## 2019-01-01 RX ADMIN — MINERAL OIL, PETROLATUM: 425; 568 OINTMENT OPHTHALMIC at 21:32

## 2019-01-01 RX ADMIN — GLYCOPYRROLATE 0.2 MG: 0.2 INJECTION INTRAMUSCULAR; INTRAVENOUS at 05:22

## 2019-01-01 RX ADMIN — MINERAL OIL, PETROLATUM: 425; 568 OINTMENT OPHTHALMIC at 09:12

## 2019-01-01 RX ADMIN — GLYCOPYRROLATE 0.2 MG: 0.2 INJECTION INTRAMUSCULAR; INTRAVENOUS at 12:59

## 2019-01-01 RX ADMIN — MORPHINE SULFATE 4 MG: 4 INJECTION, SOLUTION INTRAMUSCULAR; INTRAVENOUS at 19:49

## 2019-01-01 RX ADMIN — MORPHINE SULFATE 4 MG: 4 INJECTION, SOLUTION INTRAMUSCULAR; INTRAVENOUS at 18:15

## 2019-01-01 RX ADMIN — GLYCOPYRROLATE 0.2 MG: 0.2 INJECTION INTRAMUSCULAR; INTRAVENOUS at 21:11

## 2019-01-01 RX ADMIN — MORPHINE SULFATE 4 MG: 4 INJECTION, SOLUTION INTRAMUSCULAR; INTRAVENOUS at 23:13

## 2019-01-01 RX ADMIN — MORPHINE SULFATE 4 MG: 4 INJECTION, SOLUTION INTRAMUSCULAR; INTRAVENOUS at 15:53

## 2019-01-01 RX ADMIN — MORPHINE SULFATE 2 MG: 4 INJECTION, SOLUTION INTRAMUSCULAR; INTRAVENOUS at 10:24

## 2019-01-01 RX ADMIN — MORPHINE SULFATE 2 MG: 4 INJECTION, SOLUTION INTRAMUSCULAR; INTRAVENOUS at 17:31

## 2019-01-01 RX ADMIN — MORPHINE SULFATE 2 MG: 4 INJECTION, SOLUTION INTRAMUSCULAR; INTRAVENOUS at 01:18

## 2019-01-01 RX ADMIN — MAGNESIUM SULFATE HEPTAHYDRATE 2 G: 40 INJECTION, SOLUTION INTRAVENOUS at 19:29

## 2019-01-01 RX ADMIN — MORPHINE SULFATE 4 MG: 4 INJECTION, SOLUTION INTRAMUSCULAR; INTRAVENOUS at 06:28

## 2019-01-01 RX ADMIN — MORPHINE SULFATE 2 MG: 4 INJECTION, SOLUTION INTRAMUSCULAR; INTRAVENOUS at 03:40

## 2019-01-01 RX ADMIN — MORPHINE SULFATE 4 MG: 4 INJECTION, SOLUTION INTRAMUSCULAR; INTRAVENOUS at 01:02

## 2019-01-01 RX ADMIN — MINERAL OIL, PETROLATUM: 425; 568 OINTMENT OPHTHALMIC at 23:24

## 2019-01-01 RX ADMIN — MORPHINE SULFATE 4 MG: 4 INJECTION, SOLUTION INTRAMUSCULAR; INTRAVENOUS at 01:28

## 2019-01-01 RX ADMIN — MORPHINE SULFATE 6 MG: 4 INJECTION, SOLUTION INTRAMUSCULAR; INTRAVENOUS at 17:20

## 2019-01-01 RX ADMIN — MORPHINE SULFATE 4 MG: 4 INJECTION, SOLUTION INTRAMUSCULAR; INTRAVENOUS at 06:59

## 2019-01-01 RX ADMIN — MORPHINE SULFATE 4 MG: 4 INJECTION, SOLUTION INTRAMUSCULAR; INTRAVENOUS at 02:57

## 2019-01-01 RX ADMIN — GLYCOPYRROLATE 0.2 MG: 0.2 INJECTION INTRAMUSCULAR; INTRAVENOUS at 01:02

## 2019-01-01 RX ADMIN — MORPHINE SULFATE 2 MG: 4 INJECTION, SOLUTION INTRAMUSCULAR; INTRAVENOUS at 05:17

## 2019-01-01 RX ADMIN — GLYCOPYRROLATE 0.2 MG: 0.2 INJECTION INTRAMUSCULAR; INTRAVENOUS at 02:28

## 2019-01-01 RX ADMIN — MORPHINE SULFATE 2 MG: 4 INJECTION, SOLUTION INTRAMUSCULAR; INTRAVENOUS at 03:07

## 2019-01-01 RX ADMIN — FUROSEMIDE 40 MG: 10 INJECTION, SOLUTION INTRAMUSCULAR; INTRAVENOUS at 18:45

## 2019-01-01 RX ADMIN — MORPHINE SULFATE 4 MG: 4 INJECTION, SOLUTION INTRAMUSCULAR; INTRAVENOUS at 04:06

## 2019-01-01 RX ADMIN — MORPHINE SULFATE 4 MG: 4 INJECTION, SOLUTION INTRAMUSCULAR; INTRAVENOUS at 12:35

## 2019-01-01 RX ADMIN — KETOROLAC TROMETHAMINE 15 MG: 30 INJECTION, SOLUTION INTRAMUSCULAR at 16:38

## 2019-01-01 RX ADMIN — GLYCOPYRROLATE 0.2 MG: 0.2 INJECTION INTRAMUSCULAR; INTRAVENOUS at 10:04

## 2019-01-01 RX ADMIN — MORPHINE SULFATE 6 MG: 4 INJECTION, SOLUTION INTRAMUSCULAR; INTRAVENOUS at 09:15

## 2019-01-01 RX ADMIN — MORPHINE SULFATE 4 MG: 4 INJECTION, SOLUTION INTRAMUSCULAR; INTRAVENOUS at 12:34

## 2019-01-01 RX ADMIN — MORPHINE SULFATE 2 MG: 4 INJECTION, SOLUTION INTRAMUSCULAR; INTRAVENOUS at 12:38

## 2019-01-01 RX ADMIN — MINERAL OIL, PETROLATUM: 425; 568 OINTMENT OPHTHALMIC at 08:56

## 2019-04-03 NOTE — ED TRIAGE NOTES
Pt found down in the correctional unit in resp arrest with laceration to left eye, nose and left ear.

## 2019-04-03 NOTE — ED PROVIDER NOTES
Unknown PMH; brought in from [facility] in resp arrest; seen initially by day shift MD - right femoral line placed prior to my arrival; pt arrives within an ET tube in his tracheostomy opening. Per EMS personnel, they were told pt was found down beside his bed around 1730 this afternoon unresponsive after an apparent fall; EMS was told he had been seen normal 20 minutes prior to being found; trach apparently dislodged and was put back in prior to EMS arrival; unknown how long trach was out but EMS thinks they were told it was about 2 min; EMS noted trauma to left eye brow region and right ear. Original sat's were in 90's; once pt was moved to the staging area sat's were in 80's so EMS took trach tube out and replaced with ET tube; suctioned several times en route - blood-tinged sputum; sat's decreased en route into the 70's with ectopy on monitor; decision made to stop here - original plan was to go straight to [facility]. Pt unable to provide any hx. No past medical history on file. No past surgical history on file. No family history on file. Social History Socioeconomic History  Marital status: SINGLE Spouse name: Not on file  Number of children: Not on file  Years of education: Not on file  Highest education level: Not on file Occupational History  Not on file Social Needs  Financial resource strain: Not on file  Food insecurity:  
  Worry: Not on file Inability: Not on file  Transportation needs:  
  Medical: Not on file Non-medical: Not on file Tobacco Use  Smoking status: Not on file Substance and Sexual Activity  Alcohol use: Not on file  Drug use: Not on file  Sexual activity: Not on file Lifestyle  Physical activity:  
  Days per week: Not on file Minutes per session: Not on file  Stress: Not on file Relationships  Social connections:  
  Talks on phone: Not on file Gets together: Not on file Attends Yazidism service: Not on file Active member of club or organization: Not on file Attends meetings of clubs or organizations: Not on file Relationship status: Not on file  Intimate partner violence:  
  Fear of current or ex partner: Not on file Emotionally abused: Not on file Physically abused: Not on file Forced sexual activity: Not on file Other Topics Concern  Not on file Social History Narrative  Not on file ALLERGIES: Patient has no allergy information on record. Review of Systems Unable to perform ROS: Patient unresponsive Vitals:  
 04/03/19 1857 04/03/19 1900 04/03/19 1904 04/03/19 1905 BP:  (!) 181/98 (!) 152/113 Pulse: (!) 124 (!) 122 (!) 104 (!) 116 Resp: 18 27 27 26 SpO2:  100% 94% 99% Physical Exam  
Constitutional: He appears well-developed and well-nourished. Unresponsive HENT:  
Head: Normocephalic. Left forehead/eyebrow hematoma/lac Eyes: Conjunctivae are normal.  
Neck: No tracheal deviation present. C-collar in place Cardiovascular: Exam reveals no gallop and no friction rub. No murmur heard. Tachycardia; 2+ femoral pulses; ectopy Pulmonary/Chest:  
Assisted respirations; course breath sounds Abdominal: Soft. Musculoskeletal: He exhibits no edema or deformity. Neurological:  
unresponsive Skin: Skin is warm and dry. Psychiatric:  
Unable to assess Nursing note and vitals reviewed. MDM Number of Diagnoses or Management Options Closed head injury, initial encounter:  
Respiratory arrest [facility]): Amount and/or Complexity of Data Reviewed Clinical lab tests: ordered and reviewed Tests in the radiology section of CPT®: ordered and reviewed Obtain history from someone other than the patient: yes (EMS) Discuss the patient with other providers: yes (ED physician at [facility]) Independent visualization of images, tracings, or specimens: yes Critical Care Total time providing critical care: 30-74 minutes Patient Progress Patient progress: stable Central Line 
Date/Time: 4/3/2019 7:35 PM 
Performed by: Aletha Willson MD 
Authorized by: Aletha Willson MD  
 
Consent:  
  Consent obtained:  Emergent situation Universal protocol:  
  Procedure explained and questions answered to patient or proxy's satisfaction: no   
  Relevant documents present and verified: yes Test results available and properly labeled: no   
  Imaging studies available: no   
  Required blood products, implants, devices, and special equipment available: yes Site/side marked: yes Immediately prior to procedure, a time out was called: yes Patient identity confirmed:  Arm band and provided demographic data Pre-procedure details:  
  Hand hygiene: Hand hygiene performed prior to insertion Sterile barrier technique: All elements of maximal sterile technique followed Skin preparation:  ChloraPrep Skin preparation agent: Skin preparation agent completely dried prior to procedure Anesthesia (see MAR for exact dosages): Anesthesia method:  None Procedure details: Location:  R femoral 
  Site selection rationale:  C-collar in place, emergent situation Patient position:  Flat Landmarks identified: yes Ultrasound guidance: no   
  Number of attempts:  2 Successful placement: yes Post-procedure details: Post-procedure:  Dressing applied and line sutured Assessment:  Blood return through all ports and free fluid flow Patient tolerance of procedure: Tolerated well, no immediate complications EKG: Sinus tach; reat - 106; frequent PVC's; lateral T wave inversion. Junito Lam MD 
7:34 PM 
 
Total critical care time spent exclusive of procedures:  35 min.   Junito Lam MD 
 
A/P: found unresponsive at residential; head trauma present; trach tube dislodged - initially put back in but then ET tube placed in trach opening after sat's dropped; stabilized here and transferred to [facility].   Niki Nassar MD

## 2019-04-04 NOTE — ED NOTES
TRANSFER - OUT REPORT: 
 
Verbal report given to [facility], Critical Care Transport(name) on Indira Etienne  being transferred to [facility](unit) for urgent transfer Report consisted of patients Situation, Background, Assessment and  
Recommendations(SBAR). Information from the following report(s) SBAR, Kardex, ED Summary, [facility] and Recent Results was reviewed with the receiving nurse. Lines:  
Venous Access Device R femoral central line 04/03/19 Other (comment) (Active) Opportunity for questions and clarification was provided. Patient transported with: 
 Monitor, on Vent with EET to [facility]. Pt remains unstable. Pt sent emergently to [facility] with Critical Care Transport Team.   
EMTALA signed by all parties and sent with patient to [facility].

## 2019-04-04 NOTE — ED NOTES
Critical care at bedside to transport pt to [facility]/ED. Pt reports given to Jacinto Rodriguez RN. Emtala, facesheet, triage summary and ed encounter given to critical care. Security officers will accompany pt. Pt transvels with urinary catheter, intubated, PEG tube, and central line in rt femoral artery. Discharge or Transfer Assessment: Patient A&O x0 and in no distress. Physical re-examination reveals no improvement in pt's condition with reassessment of vital signs completed at the time of admission transfer and/or discharge.

## 2019-07-30 NOTE — ED PROVIDER NOTES
69 y.o. male with past medical history significant for respiratory arrest, laryngeal cancer, stroke, a-fib, aspiration pneumonia, and bowel perforation who presents from [facility] via EMS with chief complaint of trach change. Pt was sent to ED by infirmary staff at [facility] out of worry that the pt's tracheostomy tube has deviated and is becoming dislodged. Pt was on 4L of O2 via tracheostomy tube PTA. Pt had his airway suctioned prior to transport to ED with minimal extraction. Full history, physical exam, and ROS unable to be obtained due to:  patient non verbal/lack of medical records. There are no other acute medical concerns at this time. Surgical hx - richmond catheter placement in LLE, total laryngectomy  Social hx - unknown     PCP: Ashlee Fay MD    Note written by Nguyen Rangel, as dictated by Cheryl Gracia MD 4:45 PM.    The history is provided by the EMS personnel, medical records and the police. The history is limited by the condition of the patient. No  was used. No past medical history on file. No past surgical history on file. No family history on file.     Social History     Socioeconomic History    Marital status: SINGLE     Spouse name: Not on file    Number of children: Not on file    Years of education: Not on file    Highest education level: Not on file   Occupational History    Not on file   Social Needs    Financial resource strain: Not on file    Food insecurity:     Worry: Not on file     Inability: Not on file    Transportation needs:     Medical: Not on file     Non-medical: Not on file   Tobacco Use    Smoking status: Not on file   Substance and Sexual Activity    Alcohol use: Not Currently    Drug use: Not Currently    Sexual activity: Not on file   Lifestyle    Physical activity:     Days per week: Not on file     Minutes per session: Not on file    Stress: Not on file Relationships    Social connections:     Talks on phone: Not on file     Gets together: Not on file     Attends Hindu service: Not on file     Active member of club or organization: Not on file     Attends meetings of clubs or organizations: Not on file     Relationship status: Not on file    Intimate partner violence:     Fear of current or ex partner: Not on file     Emotionally abused: Not on file     Physically abused: Not on file     Forced sexual activity: Not on file   Other Topics Concern    Not on file   Social History Narrative    Not on file         ALLERGIES: Patient has no known allergies. Review of Systems   Unable to perform ROS: Patient nonverbal       Vitals:    07/30/19 1545   BP: 103/78   Pulse: 86   Resp: 20   SpO2: 100%   Weight: 65.8 kg (145 lb)   Height: 5' 6\" (1.676 m)            Physical Exam   Constitutional: He appears well-developed and well-nourished. HENT:   Head: Normocephalic and atraumatic. Eyes: Conjunctivae are normal.   Neck: Normal range of motion. Laryngeal tube patent   Cardiovascular: Normal rate, regular rhythm, normal heart sounds and intact distal pulses. Exam reveals no friction rub. No murmur heard. Pulmonary/Chest: Effort normal and breath sounds normal. No stridor. No respiratory distress. He has no wheezes. He has no rales. Abdominal: Soft. Bowel sounds are normal. He exhibits no distension and no mass. There is no tenderness. There is no guarding. Peg tube   Musculoskeletal: He exhibits no edema. Neurological: He is alert. nonverbal   Skin: Skin is warm and dry. Nursing note and vitals reviewed. MDM  Number of Diagnoses or Management Options  Pleural effusion, bilateral:   Diagnosis management comments: Check for tube placement and xray to eval      Given xray abnormal will order bw and ct.  Pt continues to have normal sats on his 4l trach collar       Amount and/or Complexity of Data Reviewed  Clinical lab tests: ordered and reviewed  Tests in the radiology section of CPT®: ordered and reviewed  Independent visualization of images, tracings, or specimens: yes (ekg)           Procedures    Patient began getting agitated and sound like he was wheezing. DuoNeb ordered. CONSULT NOTE:  3:56 PM Ignacio Mcclellan MD spoke with Dr. Kiley Aranda from [facility], Consult for Medicine. Discussed available diagnostic tests and clinical findings. Dr. Kiley Aranda states pt was scheduled for a tracheostomy tube change on 7/29/19 @ the facility. They didn't have the proper tracheostomy tube size so they used what they had. Dr. Valorie Goddard notes that the tracheostomy tube that was placed last night is not the size that the pt normally uses, however he doesn't know what size  tracheostomy tube the pt needs. Informed staff in ED that the pt has an active DNR but he did not send the paperwork in with the pt. Dr. Valorie Goddard wants the pt fitted for the proper  tracheostomy tube while here@ [facility]. Pt also reported to recently have aspiration pneumonia but Dr. Valorie Goddard claims the prescribed antibiotics are helping     PROGRESS NOTE:  4:06 PM  Dr. Susan Lucio called [facility] who informed her that the pt was admitted into the hospital on 7/7/19 for aspiration pneumonia. Dr. Susan Lucio inquired about reported tracheostomy tube change and [facility] reports that they have no mention at all in any of their notes of the proper size of  tracheostomy tube     CONSULT NOTE:  4:38 PM Ignacio Mcclellan MD spoke with Dr. Adalgisa Anglin, Consult for ENT. Discussed available diagnostic tests and clinical findings. Dr. Adalgisa Anglin is going to come see the pt shortly     CONSULT NOTE:  6:26 PM Ignacio Mcclellan MD spoke with Dr. Adalgisa Anglin, Consult for ENT. Discussed available diagnostic tests and clinical findings.   Dr. Adalgisa Anglin just saw the pt and adds that the pt has hx of total laryngectomy and doesn't need a tube in his stoma    PROGRESS NOTE:  6:32 PM  Dr. Susan Lucio tried to call Dr. Valorie Goddard for an update at his home number ([phone]) which was provided by correctional facility but he did not answer    Spoke with hospitalist Dr. Neville Bradley who would like patient transferred to [facility] given he is an inmate    Spoke with Dr. Kodi Guerin, [facility] emergency department, who accepts patient for transfer. I am concerned patient has developed new CHF given the pleural effusions and elevated troponin. At this time no concern for PE based on CT finding. Would trend troponin    ED EKG interpretation:  Rhythm: normal sinus rhythm; and regular .  Rate (approx.): 90; Axis: normal; P wave: normal; QRS interval: normal ; ST/T wave: non-specific change  EKG documented by Giancarlo Stovall MD, scribe, as interpreted by Karson Loyola MD, ED MD.

## 2019-07-30 NOTE — CONSULTS
Otolaryngology - Head & Neck Surgery Progress Note        PATIENT NAME: Royce Roberson  MRN: [id number]  DATE: 7/30/2019  ADMISSION DATE: 7/30/2019      Subjective:     Asked to evaluate patient for possible tracheotomy change. Patient unknown to me, history of laryngeal cancer s/p total laryngectomy at [facility]. Here for tracheotomy change. Objective:     Visit Vitals  /78 (BP 1 Location: Right arm, BP Patient Position: At rest;Sitting)   Pulse 86   Resp 20   Ht 5' 6\" (1.676 m)   Wt 65.8 kg (145 lb)   SpO2 100%   BMI 23.40 kg/m²     No intake/output data recorded. Physical Exam:     Neck - Tracheostoma widely patent after removal of Shiley #10 laryngectomy tube. No signs of tracheostomal stenosis. No mass. Airway widely patent. Assessment:     Status post total laryngectomy. His tracheostoma is widely patent and I don't think that there is a need for a laryngectomy tube. In fact, the tube seems to be narrowing his airway and resulting in some irritation around the edges of his stoma. He has pulmonary edema on CT. If need for ventilatory support, may need large ET tube with cuff inserted in stoma. Plan: We tried to contact MD at nursing home facility to discuss his care but not successful this evening.        Christie Kaplan MD   [phone] - Office   [phone] - Cell

## 2019-07-30 NOTE — ED NOTES
Called respiratory therapist for trach suction and for possible change in trach, MD also requesting ABG.

## 2019-07-30 NOTE — ED NOTES
Dr. Gee Pelayo at bedside to evaluate patient. Reported that patient had a laryngectomy and does not need to trach in place. Elba Lapine was removed and thrown in the trash by Dr. Gee Pelayo.

## 2019-07-30 NOTE — ED NOTES
Per respiratory, Pt has strong cough and no indication for need to be suctioned at this time. Will reevaluate as needed. Pt was suctioned en route with some bleeding from trach noted.

## 2019-07-30 NOTE — ED TRIAGE NOTES
[facility] with the c/o of the \"physician believing pt needs new trach\". Per EMS MD at facility feels like trach is becoming dislodged and deviated to left wanted to get \"checked for new trach need\". Pt has no other complaints, in no acute distress, pt had previous stroke in past with left sided deficit.

## 2019-07-31 NOTE — ED NOTES
TRANSFER - OUT REPORT:    Verbal report given to Kaitlin Mancilla on Janeth Halsted  being transferred to [facility] ED for routine progression of care       Report consisted of patients Situation, Background, Assessment and   Recommendations(SBAR). Information from the following report(s) SBAR, ED Summary, [facility] and Recent Results was reviewed with the receiving nurse. Opportunity for questions and clarification was provided.

## 2019-09-01 PROBLEM — Z98.890 H/O TRACHEOSTOMY: Chronic | Status: ACTIVE | Noted: 2019-01-01

## 2019-09-01 PROBLEM — I50.9 CHF (CONGESTIVE HEART FAILURE) (HCC): Chronic | Status: ACTIVE | Noted: 2019-01-01

## 2019-09-01 PROBLEM — A41.9 SEPTIC SHOCK (HCC): Status: ACTIVE | Noted: 2019-01-01

## 2019-09-01 PROBLEM — R65.21 SEPTIC SHOCK (HCC): Status: ACTIVE | Noted: 2019-01-01

## 2019-09-01 PROBLEM — C15.9 ESOPHAGEAL CANCER (HCC): Chronic | Status: ACTIVE | Noted: 2019-01-01

## 2019-09-01 PROBLEM — Z86.73 HISTORY OF COMPLETED STROKE: Chronic | Status: ACTIVE | Noted: 2019-01-01

## 2019-09-01 PROBLEM — A41.9 SEPSIS (HCC): Status: ACTIVE | Noted: 2019-01-01

## 2019-09-01 NOTE — ED NOTES
Pt remains hypotensive; however, pt is a DNR and provider has spoken with family who does not wish any further measures other than comfort measures.

## 2019-09-01 NOTE — PROGRESS NOTES
[facility]: MED RECONCILIATION    Comments/Recommendations:   Patient unable to confirm name, , allergies, and preferred pharmacy  Patient brought in from [facility]  Updated medication reconciliation via medication administration record was brought in from correctional facility;  Per facility [facility], NKDA reported. Per facility med list, appears patient completed Flagyl 500mg PO BID - and Clindamycin 600mg PO TID - as documented on MAR. Per med list, Ceftriaxone 1g IM X 1 dose was listed on MAR but does not appear to be charted. Per med list, Guaifenesin (100mg/5ml) was listed: 600mg PEG Q6hrs prn - if continued at [facility], would not exceed 400mg per dose of immediate release formulation. Per med list, Aspirin EC was being crushed and admin via PEG; If continued inpatient, recommend changing to chewable formulation to crush and give via PEG. Medications added: All meds listed below were added; There were no prior meds listed at [facility]. Information obtained from: [facility]    Allergies: Patient has no known allergies. Prior to Admission Medications:   Prior to Admission Medications   Prescriptions Last Dose Informant Patient Reported? Taking? EC ASPIRIN PO 2019 at am Transfer Papers Yes Yes   Si mg by PEG Tube route daily. acetaminophen (TYLENOL) 325 mg tablet 2019 at pm Transfer Papers Yes Yes   Si mg by Per G Tube route every four (4) hours as needed for Pain. albuterol (PROVENTIL VENTOLIN) 2.5 mg /3 mL (0.083 %) nebu 2019 at pm Transfer Papers Yes Yes   Si.5 mg by Nebulization route every six (6) hours. amiodarone (CORDARONE) 200 mg tablet 2019 at am Transfer Papers Yes Yes   Si mg by Per G Tube route daily. carvedilol (COREG) 3.125 mg tablet 2019 at pm Transfer Papers Yes Yes   Sig: 3.125 mg by Per G Tube route two (2) times daily (with meals).    diphenhydrAMINE (BENADRYL) 25 mg tablet 2019 at pm Transfer Papers Yes Yes   Si mg by Per G Tube route three (3) times daily as needed. Crush and give via PED   guaiFENesin (ROBAFEN) 100 mg/5 mL liquid 2019 at pm Transfer Papers Yes Yes   Si mg by Per G Tube route every six (6) hours as needed for Cough. hydrOXYzine HCl (ATARAX) 25 mg tablet 2019 at pm Transfer Papers Yes Yes   Si.5 mg by Per G Tube route three (3) times daily as needed (agitation). levalbuterol tartrate (XOPENEX HFA) 45 mcg/actuation inhaler 2019 at pm Transfer Papers Yes Yes   Sig: Take 2 Puffs by inhalation two (2) times a day. magnesium oxide (MAG-OX) 400 mg tablet 2019 at am Transfer Papers Yes Yes   Si mg by Per G Tube route daily. morphine IR (MS IR) 30 mg tablet  Transfer Papers Yes Yes   Sig: 15 mg by Per G Tube route three (3) times daily as needed for Pain. ondansetron hcl (ZOFRAN) 4 mg tablet  Transfer Papers Yes Yes   Si mg by Per G Tube route every six (6) hours as needed for Nausea. oxybutynin (DITROPAN) 5 mg tablet 2019 at pm Transfer Papers Yes Yes   Si mg by Per G Tube route three (3) times daily. pantoprazole (PROTONIX) 40 mg granules for oral suspension 2019 at am Transfer Papers Yes Yes   Si mg by PEG Tube route Daily (before breakfast). raNITIdine (ZANTAC) 150 mg tablet 2019 at pm Transfer Papers Yes Yes   Si mg by Per G Tube route two (2) times a day. Facility-Administered Medications: None             Greg Arteaga. MARIA E   [address]

## 2019-09-01 NOTE — ED PROVIDER NOTES
69 y.o. male with past medical history significant for respiratory arrest, stroke, a-fib, aspiration pneumonia, and bowel perforation, CHF esophageal cancer, MRSA positive sputum, s/p stoma placement who presents from [facility] via EMS with chief complaint of fever. Per EMS, patient was picked up from facility when staff noticed that patient had a fever of 105. EMS states that they arrived on site and found that patient's temperature was 100.1 F and that his BP was consistently under 985 systolic. Patient is unresponsive at baseline, but EMS states that staff was concerned because he seemed worse than usual today. Patient was seen in the ED on 7/30/19 for trach change, and treated with furosemide for bilateral plural effusion. Was MRSA positive from culture from sputum from stoma. According to facility records, patient was taking clindamycin (8/13-8/19) , flagyl (8/13-8/20) and Augmentin (7/24-7/3)    Social Hx: Unknown  PCP: Other, MD Ashlee    Note written by Nguyen Caro, as dictated by Gregory Rodriguez MD 5:27 PM  History review of systems limited as patient is unresponsive    The history is provided by the patient and the EMS personnel. The history is limited by the condition of the patient. No  was used. No past medical history on file. No past surgical history on file. No family history on file.     Social History     Socioeconomic History    Marital status: SINGLE     Spouse name: Not on file    Number of children: Not on file    Years of education: Not on file    Highest education level: Not on file   Occupational History    Not on file   Social Needs    Financial resource strain: Not on file    Food insecurity:     Worry: Not on file     Inability: Not on file    Transportation needs:     Medical: Not on file     Non-medical: Not on file   Tobacco Use    Smoking status: Not on file   Substance and Sexual Activity    Alcohol use: Not Currently    Drug use: Not Currently    Sexual activity: Not on file   Lifestyle    Physical activity:     Days per week: Not on file     Minutes per session: Not on file    Stress: Not on file   Relationships    Social connections:     Talks on phone: Not on file     Gets together: Not on file     Attends Spiritism service: Not on file     Active member of club or organization: Not on file     Attends meetings of clubs or organizations: Not on file     Relationship status: Not on file    Intimate partner violence:     Fear of current or ex partner: Not on file     Emotionally abused: Not on file     Physically abused: Not on file     Forced sexual activity: Not on file   Other Topics Concern    Not on file   Social History Narrative    Not on file         ALLERGIES: Patient has no known allergies. Review of Systems   Unable to perform ROS: Patient unresponsive (patient is unresponsive at baseline)       Vitals:    09/01/19 1702   Pulse: 96   Resp: (!) 33   SpO2: 94%            Physical Exam   Constitutional: He appears well-developed and well-nourished. HENT:   Head: Normocephalic and atraumatic. Eyes: Conjunctivae are normal.   Neck:   Patient with a stoma with no active drainage   Cardiovascular: Normal rate, regular rhythm, normal heart sounds and intact distal pulses. No murmur heard. Pulmonary/Chest: Effort normal and breath sounds normal. No stridor. No respiratory distress. He has no wheezes. He has no rales. Tachypneic   Abdominal: Soft. Bowel sounds are normal. He exhibits no distension and no mass. There is no tenderness. There is no guarding. Musculoskeletal: He exhibits edema. 1+ pitting edema bilateral arms and legs   Neurological:   Minimally responsive   Skin: Skin is warm and dry. Nursing note and vitals reviewed. MDM  Number of Diagnoses or Management Options  Septic shock [facility]):   Diagnosis management comments: Patient with fever and altered mental status. Suspect altered mental status is from the fever itself. I have seen him before and he is usually nonverbal but does sometimes track which he is not doing now. Check his urine as source of infection I have also instructed the guards and nurse to get the patient out of his jumpsuit so we can evaluate for skin breakdown. Another source of possibly be from his stoma which apparently grew out MRSA recently. Amount and/or Complexity of Data Reviewed  Clinical lab tests: ordered and reviewed  Tests in the radiology section of CPT®: ordered and reviewed  Obtain history from someone other than the patient: yes (Daughter)  Review and summarize past medical records: yes  Discuss the patient with other providers: yes (Hospitalist)  Independent visualization of images, tracings, or specimens: yes (EKG)    Critical Care  Total time providing critical care: 30-74 minutes (Total critical care time spent exclusive of procedures:  35)    Patient Progress  Patient progress: improved         Procedures    ED EKG interpretation:  Rhythm: normal sinus rhythm; and regular . Rate (approx.): 90; Axis: left axis deviation; P wave: normal; QRS interval: prolonged; ST/T wave: non-specific changes  EKG documented by Travis Beavers MD, scribe, as interpreted by Guanaco Kellogg MD, ED MD.      5:15 PM  Spoke with the guards with Mr. Cecily Rouse to see if I can notify his next of kin about how ill he is. State that they need to call their  to see if they will reach out to family. They will do that now    PROGRESS NOTE:  5:36 PM  Called Debi leonardo (brother) with number from guards ([phone]) but that is no longer his number. Left message for Miguel Babinski at [phone]    PROGRESS NOTE:  5:55 PM  Spoke with Nurse Practitioner from correctional facility. Nurse will try to get in touch with Miguel Babinski (daughter)    PROGRESS NOTE:  5:57 PM  Spoke with daughter, Miguel Babinski who is unable to come to bedside.  Told daughter that patient was in critical condition and may pass away soon. Daughter states that she does not want to escalate care and wants to keep patient comfortable. States she is too far away to come and be here for the patient    CONSULT NOTE:   Dr. Yulia Wallace is in the ED at this time and evaluated the patient. He will write orders and admit the patient. 6:00 PM    6:00 PM  Patient is being admitted to the hospital.  The results of their tests and reasons for their admission have been discussed with them and/or available family. They convey agreement and understanding for the need to be admitted and for their admission diagnosis. Patient came with a DNR and we are not going to escalate care. I will give the IV fluids he is gotten 500 mL's so far in his lungs are still clear to auscultation however he has advanced heart failure.

## 2019-09-01 NOTE — ED NOTES
Spoke to Dr. Lea Knox about comfort care measures who will place orders for DNR/palliative care orders.

## 2019-09-01 NOTE — H&P
[address]  [phone]    Admission History and Physical      NAME:  Sly Seo   :   1945   MRN:  [id number]     PCP:  Mile Garcia MD     Date/Time:  2019         Subjective:     CHIEF COMPLAINT: fever     HISTORY OF PRESENT ILLNESS:     Mr. Christopher Haines is a 69 y.o.  male who is admitted with sepsis. Mr. Christopher Haines presented to the Emergency Department this PM for evaluation of fever. He is non-verbal at baseline. He is an inmate at [facility]. He was noted earlier today to have fever up to 105.1 which did not respond to antipyretics. EMS transported him to our ED, where he was found to be septic and hypotensive. Dr. Rhianna Casillas contacted his family and his daughter requested he be DO NOT RESUSCITATE and that care not be escalated, specifically no pressors or ventilator. History obtained from chart review and unobtainable from patient due to mental status. Previous records reviewed, ED visit 2019 for arrest, transferred to [facility]. ED visit 2019 for trach change    Past Medical History:   Diagnosis Date    CHF (congestive heart failure) ([facility] [address] 2019    Esophageal cancer ([facility] [address] 2019    H/O tracheostomy 2019    History of completed stroke 2019        No past surgical history on file.     Social History     Tobacco Use    Smoking status: Unknown If Ever Smoked   Substance Use Topics    Alcohol use: Not Currently        Family History   Family history unknown: Yes        No Known Allergies     Prior to Admission medications    Not on File         Review of Systems:  (bold if positive, if negative)    Unable to obtain from patient, non-verbal at baseline         Objective:      VITALS:    Vital signs reviewed; most recent are:    Visit Vitals  BP 93/62   Pulse 84   Temp (!) 101.6 °F (38.7 °C)   Resp (!) 34   Ht 5' 6\" (1.676 m)   Wt 65.8 kg (145 lb)   SpO2 96%   BMI 23.40 kg/m²     SpO2 Readings from Last 6 Encounters:   09/01/19 96%   07/30/19 (!) 88%   04/03/19 97%    O2 Flow Rate (L/min): 10 l/min       Intake/Output Summary (Last 24 hours) at 9/1/2019 1916  Last data filed at 9/1/2019 1854  Gross per 24 hour   Intake 1000 ml   Output 50 ml   Net 950 ml            Exam:     Physical Exam:    Gen: Well-developed, well-nourished, frail, elderly, acutely ill-appearing  HEENT:  Pink conjunctivae, PERRL, moist mucous membranes, trach in place  Neck: Supple, without masses, thyroid non-tender  Resp: No accessory muscle use, clear breath sounds without wheezes rales or rhonchi  Card: No murmurs, normal S1, S2 without thrills, bruits or peripheral edema, 2+ LE peripheral pulses  Abd:  Soft, non-tender, non-distended, normoactive bowel sounds are present, no palpable organomegaly and no detectable hernias  Lymph:  No cervical or inguinal adenopathy  Musc: No cyanosis or clubbing  Skin: No rashes or ulcers, skin turgor is good, cap refill <2 sec  Neuro:  Cranial nerves cannot be adequately assessed, no voluntary movement, does not follow commands appropriately  Psych:  No insight, not oriented to person, place or time, unresponsive             Labs:    Recent Labs     09/01/19  1719   WBC 8.5   HGB 11.1*   HCT 35.1*        Recent Labs     09/01/19  1719      K 5.3*      CO2 28   *   BUN 53*   CREA 2.12*   CA 8.6   ALB 2.2*   TBILI 0.7   SGOT 971*   *     No results found for: GLUCPOC  No results for input(s): PH, PCO2, PO2, HCO3, FIO2 in the last 72 hours. No results for input(s): INR in the last 72 hours. No lab exists for component: INREXT, INREXT    Additional testing:  Chest X-ray: edema vs air space disease bilaterally, visualized by me.   Results not reviewed with Radiologist.       Assessment/Plan:       Principal Problem:    Sepsis ([facility] [address] (9/1/2019)/Septic shock ([facility] [address] (9/1/2019)   - unclear source, could be blood or urine   - will continue antibiotics started in ED but family has asked us not to escalate   - comfort care measures   - consult Palliative Care for transition to hospice if he survives    Active Problems:    History of completed stroke (9/1/2019)   - suspect this was the source of his presentation here in April   - appears to have been a devastating stroke resulting in him getting both a feeding tube and a trach      H/O tracheostomy (9/1/2019)   - trach care for comfort, would not be aggressive with suctioning   - consider anticholinergics if secretions are an issue      CHF (congestive heart failure) ([facility] [address] (9/1/2019)   - unknown type, no echo results here   - will not pursue given family's request for comfort measures      Esophageal cancer ([facility] [address] (9/1/2019)   - unknown status       Code status:   - patient is DO NOT RESUSCITATE, DDNR on file      Total time spent on patient care: [address] discussed with: Patient, Nursing Staff and Dr. Nabil Montoya    Discussed:  Code Status, Care Plan and D/C Planning    Prophylaxis:  comfort measures    Probable Disposition:  hospice           ___________________________________________________    Attending Physician: Corie Solares MD

## 2019-09-01 NOTE — PROGRESS NOTES
[facility] Dosing Services: Antimicrobial Stewardship Progress Note    Consult for Vancomycin dosing by Dr. Bryan Parker  Pharmacist reviewed the antibiotic appropriateness for this 69year old , male  for Sepsis coverage. Day of Therapy 1    Plan:  Vancomycin therapy started with a loading dose of 1500 mg. Further doses will be ordered based on random levels due to current renal status (CrCl < 30 mL/min)  Doses will be calculated to approximate a therapeutic trough of 15-20 mcg/mL. Plan for level: 24-48 hrs post loading dose. Pharmacist will follow daily and make changes to dose and/or frequency based on clinical status. Non-Kinetic Antimicrobial Dosing:   Current Regimen:  Cefepime 2 gm IV Q 8 hrs  PLAN: Change regimen to 2 gm IV Q 24 hrs (for CrCl 10-29 mL/min)    Other Antimicrobial  (not dosed by pharmacist)      Cultures     9/1 Blood: in process  9/1 Urine: in process   Serum Creatinine     Lab Results   Component Value Date/Time    Creatinine 2.12 (H) 09/01/2019 05:19 PM         Creatinine Clearance Estimated Creatinine Clearance: 28.4 mL/min (A) (based on SCr of 2.12 mg/dL (H)).      Temp   (!) 101.6 °F (38.7 °C)      WBC   Lab Results   Component Value Date/Time    WBC 8.5 09/01/2019 05:19 PM         H/H   Lab Results   Component Value Date/Time    HGB 11.1 (L) 09/01/2019 05:19 PM          Platelets   Lab Results   Component Value Date/Time    PLATELET 598 62/76/1247 05:19 PM            Pharmacist: Namita Umaña, PHARMD Contact information: [phone]

## 2019-09-01 NOTE — ED NOTES
Verbal report given to Demi(name) on Pedro Enriquez being transferred to 5th floor(unit) for routine progression of care    Report consisted of patient's Situation, Background, Assessment and Recommendations (SBAR)    Information from the following report(s)  SBAR, ED Summary, MAR, Recent Results and Cardiac Rhythm NSR was reviewed with the receiving nurse. Opportunity for questions and clarification was provided.     Patient transported with:  Tech    Last Filed Values:  Temp: (!) 102.8 °F (39.3 °C) (09/01/19 1826)  Pulse (Heart Rate): 85 (09/01/19 1808)  Resp Rate: (!) 31 (09/01/19 1808)  O2 Sat (%): 99 % (09/01/19 1808)  BP: (!) 83/60 (09/01/19 1808)  MAP (Monitor): 66 (09/01/19 1808)  MAP (Calculated): (!) 64 (09/01/19 1733)  Level of Consciousness: (!) Unresponsive (09/01/19 1702)      Lab Results   Component Value Date/Time    WBC 8.5 09/01/2019 05:19 PM       Repeat LA:  Time Due n/a    Blood Cultures Drawn:  yes    Fluid Resuscitation:  Total needed see MAR, Status infusing    All Antibiotics Started:  yes, Dose Due see MAR    VS x 2 post-fluid resuscitation:   yes    Vasopressor Infusion:  no pt on comfort care measures, DNR    Provider Reassessment needed and notified:  no , Due as needed    Additional Interventions/Comments:  n/a

## 2019-09-02 NOTE — CONSULTS
Palliative Medicine Consult  [place]: [phone] ([phone])    Patient Name: Royce Roberson  YOB: 1945    Date of Initial Consult: 9/2/2019  Reason for Consult: Care decisions  Requesting Provider: Dr. Madhu Decker  Primary Care Physician: Ashlee Fay MD     SUMMARY:   Royce Roberson is a 69 y.o. with a past history of esophageal cancer, trach, malnutrition, congestive heart failure, stroke, nonverbal who was admitted on 9/1/2019 from [address] [place] [address],[address] with a diagnosis of sepsis/fever 105. Current medical issues leading to Palliative Medicine involvement include: Care decisions. Chart reviewed patient admitted to the hospital on 9/1 with sepsissource unclear. The patient with prior stroke requiring trach/PEG along with a history of esophageal cancer. There is very limited information given that he is at the correctional center. Dr. Trini Solano was able to talk with his daughter/legal next of kin and she was wanting to focus more towards comfort. PALLIATIVE DIAGNOSES:   1. Goals of care discussion  2. DNR discussion  3. Sepsis  4. Shortness of breath  5. History of stroke       PLAN:   1. Met with patient who is nonverbal.  He appears in some mild respiratory distress. 2. Goals of care I was able to talk with his daughter Theodore nagel [phone]. Reviewed the current medical issues with her and she was up-to-date from prior discussions with hospitalist.  We discussed the advanced nature of his disease processes and he appears very ill and likely will pass in the hospital.  She agrees on complete transition towards comfort care and actually agrees via phone on transition towards inpatient hospice. 3. Advance care planningno AMD in the chart but durable DO NOT RESUSCITATE form was completed verbally in July with Theodore Malin. She is legal next of kin  4. CODE STATUSDrea very clear on do not attempt resuscitation  5.  Symptom managementcomfort order set placed until he can be transition to inpatient hospice. 6. Discussed with bedside nurse, Dr. Mayo noel  7. Edward will not be able to travel to [place]. She lives in [place] and works for [employer]. She will need to work tomorrow. 8. Initial consult note routed to primary continuity provider and/or primary health care team members  9. Communicated plan of care with: Palliative IDT, [facility] Team     GOALS OF CARE / TREATMENT PREFERENCES:     GOALS OF CARE:  Patient/Health Care Proxy Stated Goals: Comfort    TREATMENT PREFERENCES:   Code Status: DNR    Advance Care Planning:  [x] [facility] Interdisciplinary Team has updated the ACP Navigator with Health Care Decision Maker and Patient Capacity      No flowsheet data found. Medical Interventions: Comfort measures     Other Instructions: Other:    As far as possible, the palliative care team has discussed with patient / health care proxy about goals of care / treatment preferences for patient.      HISTORY:     History obtained from: Chart, daughter    CHIEF COMPLAINT: Fever    HPI/SUBJECTIVE:    The patient is:   [] Verbal and participatory  [x] Non-participatory due to: Prior stroke       Clinical Pain Assessment (nonverbal scale for severity on nonverbal patients):   Clinical Pain Assessment  Severity: 0     Activity (Movement): Lying quietly, normal position    Duration: for how long has pt been experiencing pain (e.g., 2 days, 1 month, years)  Frequency: how often pain is an issue (e.g., several times per day, once every few days, constant)     FUNCTIONAL ASSESSMENT:     Palliative Performance Scale (PPS):  PPS: 20       PSYCHOSOCIAL/SPIRITUAL SCREENING:     Palliative IDT has assessed this patient for cultural preferences / practices and a referral made as appropriate to needs (Cultural Services, Patient Advocacy, Ethics, etc.)    Any spiritual / Roman Catholic concerns:  [] Yes /  [x] No    Caregiver Burnout:  [] Yes /  [] No /  [x] No Caregiver Present      Anticipatory grief assessment:   [x] Normal  / [] Maladaptive       ESAS Anxiety:      ESAS Depression:          REVIEW OF SYSTEMS:     Positive and pertinent negative findings in ROS are noted above in HPI. The following systems were [x] reviewed / [] unable to be reviewed as noted in HPI  Other findings are noted below. Systems: constitutional, ears/nose/mouth/throat, respiratory, gastrointestinal, genitourinary, musculoskeletal, integumentary, neurologic, psychiatric, endocrine. Positive findings noted below. Modified ESAS Completed by: provider   Fatigue: 3 Drowsiness: 5     Pain: 0     Nausea: 0   Anorexia: 2 Dyspnea: 5     Constipation: No              PHYSICAL EXAM:     From RN flowsheet:  Wt Readings from Last 3 Encounters:   09/01/19 145 lb (65.8 kg)   07/30/19 145 lb (65.8 kg)   04/03/19 145 lb (65.8 kg)     Blood pressure (!) 71/44, pulse 86, temperature 97.2 °F (36.2 °C), resp. rate (!) 33, height 5' 6\" (1.676 m), weight 145 lb (65.8 kg), SpO2 (!) 60 %.     Pain Scale 1: Adult Nonverbal Pain Scale                    Last bowel movement, if known:     Constitutional: Ill-appearing, ashen, eyes wide open  Eyes: pupils equal, anicteric  ENMT: no nasal discharge, moist mucous membranes, trach collar in place  Cardiovascular: regular rhythm, distal pulses intact  Respiratory: breathing mild to moderately labored with accessory muscle use, symmetric  Gastrointestinal: soft non-tender, +bowel sounds, PEG in place  Musculoskeletal: no deformity, no tenderness to palpation  Skin: warm, dry, cool extremities  Neurologic: not following commands, not moving all extremities  Psychiatric: Restless other:       HISTORY:     Principal Problem:    Sepsis ([address] (9/1/2019)    Active Problems:    History of completed stroke (9/1/2019)      H/O tracheostomy (9/1/2019)      CHF (congestive heart failure) ([address] (9/1/2019)      Esophageal cancer ([facility] (9/1/2019)      Septic shock ([facility] (9/1/2019)      Past Medical History:   Diagnosis Date    CHF (congestive heart failure) ([facility] 9/1/2019    Esophageal cancer ([facility] 9/1/2019    H/O tracheostomy 9/1/2019    History of completed stroke 9/1/2019      No past surgical history on file. Family History   Family history unknown: Yes      History reviewed, no pertinent family history.   Social History     Tobacco Use    Smoking status: Unknown If Ever Smoked   Substance Use Topics    Alcohol use: Not Currently     No Known Allergies   Current Facility-Administered Medications   Medication Dose Route Frequency    morphine injection 2 mg  2 mg IntraVENous Q15MIN PRN    scopolamine (TRANSDERM-SCOP) 1 mg over 3 days 1 Patch  1 Patch TransDERmal Q72H PRN    glycopyrrolate (ROBINUL) injection 0.2 mg  0.2 mg IntraVENous Q4H PRN    ketorolac (TORADOL) injection 30 mg  30 mg IntraVENous Q8H PRN    LORazepam (ATIVAN) injection 1 mg  1 mg IntraVENous Q15MIN PRN    sodium chloride (NS) flush 5-10 mL  5-10 mL IntraVENous PRN    prochlorperazine (COMPAZINE) injection 10 mg  10 mg IntraVENous Q4H PRN    LORazepam (INTENSOL) 2 mg/mL oral concentrate 1 mg  1 mg Per G Tube Q1H PRN    acetaminophen (TYLENOL) tablet 650 mg  650 mg Oral Q4H PRN    Or    acetaminophen (TYLENOL) solution 650 mg  650 mg Per G Tube Q4H PRN    Or    acetaminophen (TYLENOL) suppository 650 mg  650 mg Rectal Q4H PRN    albuterol (PROVENTIL VENTOLIN) nebulizer solution 2.5 mg  2.5 mg Nebulization Q2H PRN    morphine (ROXANOL) 100 mg/5 mL (20 mg/mL) concentrated solution 10 mg  10 mg Per G Tube Q1H PRN     Facility-Administered Medications Ordered in Other Encounters   Medication Dose Route Frequency    LORazepam (ATIVAN) injection 1 mg  1 mg IntraVENous Q15MIN PRN    ondansetron (ZOFRAN) injection 4 mg  4 mg IntraVENous Q4H PRN    ketorolac (TORADOL) injection 30 mg  30 mg IntraVENous Q8H PRN    scopolamine (TRANSDERM-SCOP) 1 mg over 3 days 1 Patch  1 Patch TransDERmal Q72H    glycopyrrolate (ROBINUL) injection 0.2 mg  0.2 mg IntraVENous Q4H    bisacodyl (DULCOLAX) suppository 10 mg  10 mg Rectal DAILY PRN    morphine injection 2 mg  2 mg IntraVENous Q15MIN PRN    morphine injection 2 mg  2 mg IntraVENous Q2H          LAB AND IMAGING FINDINGS:     Lab Results   Component Value Date/Time    WBC 8.5 09/01/2019 05:19 PM    HGB 11.1 (L) 09/01/2019 05:19 PM    PLATELET 251 50/68/9805 05:19 PM     Lab Results   Component Value Date/Time    Sodium 139 09/01/2019 05:19 PM    Potassium 5.3 (H) 09/01/2019 05:19 PM    Chloride 101 09/01/2019 05:19 PM    CO2 28 09/01/2019 05:19 PM    BUN 53 (H) 09/01/2019 05:19 PM    Creatinine 2.12 (H) 09/01/2019 05:19 PM    Calcium 8.6 09/01/2019 05:19 PM    Magnesium 2.2 04/03/2019 07:20 PM      Lab Results   Component Value Date/Time    AST (SGOT) 971 (H) 09/01/2019 05:19 PM    Alk. phosphatase 99 09/01/2019 05:19 PM    Protein, total 7.4 09/01/2019 05:19 PM    Albumin 2.2 (L) 09/01/2019 05:19 PM    Globulin 5.2 (H) 09/01/2019 05:19 PM     Lab Results   Component Value Date/Time    INR 1.1 04/03/2019 07:20 PM    Prothrombin time 10.4 04/03/2019 07:20 PM    aPTT 27.7 04/03/2019 07:20 PM      No results found for: IRON, FE, TIBC, IBCT, PSAT, FERR   Lab Results   Component Value Date/Time    pH 7.48 (H) 07/30/2019 04:10 PM    PCO2 28 (L) 07/30/2019 04:10 PM    PO2 133 (H) 07/30/2019 04:10 PM     No components found for: GLPOC   No results found for: CPK, CKMB             Total time: 50  Counseling / coordination time, spent as noted above: 45  > 50% counseling / coordination?: yes    Prolonged service was provided for  []30 min   []75 min in face to face time in the presence of the patient, spent as noted above. Time Start:   Time End:   Note: this can only be billed with 78722 (initial) or 39058 (follow up). If multiple start / stop times, list each separately.

## 2019-09-02 NOTE — HOSPICE
[facility]  [phone]    Inpatient Nursing Admission   Patient Name: Elizabeth Rodriguez  YOB: 1945  Age: 69 y.o. Date of Hospice Admission:09/02/19  Hospice Attending Elected by Patient: Aaliyah Borges MD  Primary Care Physician: Ashlee Fay MD  Admitting RN: Darvin Gamez RN  : [address]    Level of Care (GIP/Routine/Respite): GIP  Facility of Care: [facility]  Patient Room: [facility]/     HOSPICE SUMMARY   ER Visits/ Hospitalizations in past year: 3  Hospice Diagnosis: Sepsis ([facility] [A41.9]  Onset Date of Hospice Diagnosis: 09/02/19  Summary of Disease Progression Leading to Hospice Diagnosis: Kevin Kake old AAM admitted to the ED w/ sepsis, fever up to 105, hypotension. Patient has hx of esophageal cancer w/ trach, non-verbal. MPOA/daughter elected to have patient be DNR/comfort care only. Would like hospice under GIP LOC. Co-Morbidities:   Patient Active Problem List   Diagnosis Code    Sepsis ([facility] A41.9    History of completed stroke Z80.78    H/O tracheostomy Z98.890    CHF (congestive heart failure) ([facility] I50.9    Esophageal cancer (H[facility]) C15.9    Septic shock ([facility] A41.9, R65.21     Diagnoses RELATED to the terminal prognosis: esophageal cancer  Other Diagnoses: see above    Rationale for a prognosis of life expectancy of 6 months or less if the disease follows its normal course (Disease Specific History):     Elizabeth Rodriguez is a 69 y.o. who was admitted to [facility]. The patient's principle diagnosis of sepsis has resulted in pursuit of hospice care. Functionally, the patient's Palliative Performance Scale has declined over a period of days  and is estimated at 10. Objective information that support this patients limited prognosis includes: SOB, tachycardic, febrile, tacypnea. The patient/family chose comfort measures with the support of Hospice.     Patient meets for GIP LOC as evidenced by need for frequent nursing assessments, requiring frequent IV medications that cannot be given outside the hospital setting. Per hospice MD at this point patient is too unstable for transportation to outside facility    Prognosis estimated based on 09/02/19 clinical assessment is:   [x] Few to Many Hours  [] Hours to Days   [] Few to Many Days   [] Days to Weeks   [] Few to Many Weeks   [] Weeks to Months   [] Few to Many Months    ASSESSMENT    Patient self-reports:  []  Yes    [x] No    SYMPTOMS: SOB, excessive and audible secretions, hypotension    SIGNS/PHYSICAL FINDINGS: Cheyne- Jara breathing, audible secretions, unresponsive     KARNOFSKY: 10      Learning Assessment:  Patient Is unable to learn    Caregiver is willing to learn and receptive of information given    CLINICAL INFORMATION     Wt Readings from Last 3 Encounters:   09/01/19 65.8 kg (145 lb)   07/30/19 65.8 kg (145 lb)   04/03/19 65.8 kg (145 lb)     Ht Readings from Last 3 Encounters:   09/01/19 5' 6\" (1.676 m)   07/30/19 5' 6\" (1.676 m)   04/03/19 5' 5.75\" (1.67 m)     There is no height or weight on file to calculate BMI. There were no vitals taken for this visit. LAB VALUES  No results found for this visit on 09/02/19 (from the past 12 hour(s)). No results found for this visit on 09/02/19 (from the past 6 hour(s)). Lab Results   Component Value Date/Time    Protein, total 7.4 09/01/2019 05:19 PM    Albumin 2.2 (L) 09/01/2019 05:19 PM       Currently this patient has:  [x] Supplemental O2 [x] Peripheral IV  [] PICC    [] PORT   [x] Cummings Catheter [] NG Tube   [] PEG Tube [] Ostomy    [] AICD: Has ICD been deactivated? [] Yes [] No:______    PLAN     1. Admit to hospice under GIP LOC  2. Schedule 2mg Morphine q2hr for severe SOB and labored breathing  3. Schedule Robinul and Scop patch for management of excessive secretions  4. Monitor frequently for changes, administer PRN medications as necessary  5.  Provide updates to mpoa/daughter in [place] who is unable to be present due to travel    Hospice Team Frequency Orders:  Skilled Nurse -   Daily x 7 days /every other day x 7 days with 5 PRN visits for symptom control. MSCHAYITO  1 visit for initial assessment/evaluation for family support and need for volunteer services. Illarry Mati  1 visit for initial assessment/evaluation for spiritual support. ADVANCE CARE PLANNING (Complete in ACP Flow Sheet)   Code Status: DNR  Durable DNR: []  Yes  [x]  No  Code Status Discussed/Confirmed:  yes  Preference for Other Life Sustaining Treatment Discussed/Confirmed: yes  Hospitalization Preference: [facility]  (ex. Patient would like to receive end of life care in the hospital, in a facility, at home etc.)  No flowsheet data found.  Service: [] Yes  [x]  No      [] Unknown  Appropriate for Pinning Ceremony:  [] Yes     [x] No  Restorationism: No Sikh on file   Home: TBD    DISCHARGE PLANNING     1. Discharge Plan: should patient stabilize, [facility]  2. Patient/Family teaching:  EOL symptoms  3. Response to patient/family teaching: verbalized understanding    SOCIAL/EMOTIONAL/SPIRITUAL NEEDS     Spiritual Issues Identified:  visits as indicated    Psych/ Social/ Emotional Issues Identified: Patient is incarcerated, family is out of state and cannot travel     Caregiver Support:  [x] Provided information on End of Life Care   [] Material Provided: [facility] or [facility] contacted, discharge to hospice order received  Dr. Felisa De La O contacted, agrees to serve as attending provider for hospice and provided verbal certification of terminal illness with life expectancy of 6 months or less. Orders for hospice admission, medications and plan of treatment received. Medication reconciliation completed.   MEDS: See medication list below  DME: Per hospital  Supplies: Per hospital  IDT communication to include MD, SN, SW, [address] and support team    ALLERGIES AND MEDICATIONS     Allergies: No Known Allergies  Current Facility-Administered Medications   Medication Dose Route Frequency    LORazepam (ATIVAN) injection 1 mg  1 mg IntraVENous Q15MIN PRN    ondansetron (ZOFRAN) injection 4 mg  4 mg IntraVENous Q4H PRN    ketorolac (TORADOL) injection 30 mg  30 mg IntraVENous Q8H PRN    scopolamine (TRANSDERM-SCOP) 1 mg over 3 days 1 Patch  1 Patch TransDERmal Q72H    glycopyrrolate (ROBINUL) injection 0.2 mg  0.2 mg IntraVENous Q4H    bisacodyl (DULCOLAX) suppository 10 mg  10 mg Rectal DAILY PRN    morphine injection 2 mg  2 mg IntraVENous Q15MIN PRN    morphine injection 2 mg  2 mg IntraVENous Q2H     No current outpatient medications on file.      Facility-Administered Medications Ordered in Other Encounters   Medication Dose Route Frequency    morphine injection 2 mg  2 mg IntraVENous Q15MIN PRN    scopolamine (TRANSDERM-SCOP) 1 mg over 3 days 1 Patch  1 Patch TransDERmal Q72H PRN    glycopyrrolate (ROBINUL) injection 0.2 mg  0.2 mg IntraVENous Q4H PRN    ketorolac (TORADOL) injection 30 mg  30 mg IntraVENous Q8H PRN    LORazepam (ATIVAN) injection 1 mg  1 mg IntraVENous Q15MIN PRN    sodium chloride (NS) flush 5-10 mL  5-10 mL IntraVENous PRN    prochlorperazine (COMPAZINE) injection 10 mg  10 mg IntraVENous Q4H PRN    LORazepam (INTENSOL) 2 mg/mL oral concentrate 1 mg  1 mg Per G Tube Q1H PRN    acetaminophen (TYLENOL) tablet 650 mg  650 mg Oral Q4H PRN    Or    acetaminophen (TYLENOL) solution 650 mg  650 mg Per G Tube Q4H PRN    Or    acetaminophen (TYLENOL) suppository 650 mg  650 mg Rectal Q4H PRN    albuterol (PROVENTIL VENTOLIN) nebulizer solution 2.5 mg  2.5 mg Nebulization Q2H PRN    morphine (ROXANOL) 100 mg/5 mL (20 mg/mL) concentrated solution 10 mg  10 mg Per G Tube Q1H PRN

## 2019-09-02 NOTE — DISCHARGE SUMMARY
Physician Discharge Summary     Patient ID:  Stephanie Miller  [id number]  43 y.o.  1945    Admit date: 9/1/2019    Discharge date and time: 9/2/2019    Admission Diagnoses: Sepsis [facility]) [A41.9]    Discharge Diagnoses/Hospital Course   Sepsis ([facility] (9/1/2019)/Septic shock ([facility] (9/1/2019) - unclear source. Possible intra-ab source considering markedly elevated LFT's. On admission the ED MD spoke with daughter who is next of kin and she opted for no escalation care. On the AM of my assessment pt was minimally responsive, hypotensive. I spoke to the daughter and she opted to make him comfort measures. He already had a scanned DNR/DNI in our system. Hospice team was consulted as appeared to be actively dying and he was accepted to [facility] hospice.      PCP: Other, Phys, MD     Consults: Palliative Care    Discharge Exam:  Visit Vitals  BP (!) 71/44   Pulse 86   Temp 97.2 °F (36.2 °C)   Resp (!) 33   Ht 5' 6\" (1.676 m)   Wt 65.8 kg (145 lb)   SpO2 (!) 60%   BMI 23.40 kg/m²        Gen:    Frail, elderly, acutely ill-appearing but also chronically ill appearing  HEENT:  Pink conjunctivae, PERRL, moist mucous membranes, trach in place  Neck:  Supple, without masses, thyroid non-tender  Resp:  No accessory muscle use, clear breath sounds without wheezes rales or rhonchi  Card:   No murmurs, normal S1, S2 without thrills, bruits or peripheral edema, 2+ LE peripheral pulses  Abd:  Soft, non-tender, non-distended, normoactive bowel sounds are present, no palpable organomegaly and no detectable hernias  Lymph:  No cervical or inguinal adenopathy  Musc:  No cyanosis or clubbing  Skin:   No rashes or ulcers, skin turgor is good, cap refill <2 sec  Neuro:  Cranial nerves cannot be adequately assessed, no voluntary movement, does not follow commands appropriately  Psych:  No insight, not oriented to person, place or time, somnolent, minimally responds to noxious stimuli     Disposition: Inpt hospice    Approximate time spent in patient care on day of discharge: 32 minutes     Signed:  Haider Mcfarlane MD  9/2/2019  10:14 AM

## 2019-09-02 NOTE — ACP (ADVANCE CARE PLANNING)
Advance care planning reviewed    There is not an advanced medical directive in the chart. Daughter Alejandra Randolph is daughter and legal next of kin.     Patient has a durable DO NOT RESUSCITATE form in the chart that was completed remotely after a discussion via phone with his daughter    Focus will be on comfort and transition to hospice care

## 2019-09-02 NOTE — ROUTINE PROCESS
Bedside shift change report given to Narcisa Baird and Camilla Esposito (oncoming nurse) by Shawna Boone (offgoing nurse). Report included the following information SBAR, Kardex, Intake/Output, MAR, Accordion, Recent Results and Med Rec Status.

## 2019-09-02 NOTE — H&P
[facility] Help to Those in Need  [phone]    Patient Name: Princess Raymond  YOB: 1945    Date of Provider Hospice Visit: 09/02/19    Level of Care:   [x] General Inpatient (GIP)    [] Routine   [] Respite    Current Location of Care:  [] [facility] [x] [facility] [] [facility] [] [facility] [] H[facility]ice House [facility], patient referred from:  [] [facility] [] [facility] [] [facility] [] [facility] [] Home [] Other:     Date of Original Hospice Admission: 9/2/2019  Hospice Medical Director at time of admission: D-Ã[employer]G [employer] Diagnosis: Sepsis  Diagnoses RELATED to the terminal prognosis: Esophageal cancer, massive stroke, nonverbal, trach and PEG, severe protein malnutrition  Other Diagnoses:      HOSPICE SUMMARY     Princess Raymond is a 69y.o. year old who was admitted to [facility]. Patient is a 70-year-old gentleman who has a [place] correctional inmate. Patient with a history of extensive stroke that has left him nonverbal and he also has required a PEG and a trach. He has a history of esophageal cancer which  unfortunately do not know a lot of the significant details. He presents to the emergency room with sepsis, source unknown. Patient hypotensive but with significant issues with secretions and shortness of breath. After discussion with his daughter via phone, agreement and transition to inpatient hospice care to focus on his comfort. Patient an inmate in the correctional facility. We had to obtain consents verbally via phone secondary to his daughter living in [place] and unable to travel to the hospital.  In addition, we would need permission from the correctional facility to allow visitation to the hospital.  After discussion with his daughter, she was not going to be able to travel to [place] secondary to her work schedule. She states she had seen him earlier this year and was okay with the focus to be comfort.     The patient's principle diagnosis has resulted in minimally responsive  Refer to LCD     Functionally, the patient's Karnofsky and/or Palliative Performance Scale has declined over a period of days to weeks and is estimated at 10. The patient is dependent on the following ADLs: All    Objective information that support this patients limited prognosis includes:  Fever to 105  Albumin of 2.2   chest x-ray with diffuse patchy infiltrates edema versus infection-      The patient/family chose comfort measures with the support of Hospice. HOSPICE DIAGNOSES   Active Symptoms:  1. Shortness of breath  2. Secretions  3. Restlessness     PLAN      1.  and SW to support family needs  2. Patient admitted GIP [facility] care secondary to frequent nursing assessment and the need for IV medication management. In addition patient, unsafe to travel given his hypotension and hypoxia. 3. Morphine 2 mg IV every 2 hours scheduled and every 15 minutes as needed for pain and shortness of breath  4. Scopolamine patch and Robinul 0.2 mill grams IV every 4 hours scheduled for secretions  5. Comfort order set placed for all other symptoms to include fever, nausea, agitation  6. Plan reviewed with bedside nurse, [address] [place] [address] Butch, daughter via phone  7. Disposition: Likely will die in the hospital    Prognosis estimated based on 09/02/19 clinical assessment is:   [x] Hours to Days    [] Days to Weeks    [] Other:    Communicated plan of care with: Hospice Case Manager; Hospice IDT; Care Team     GOALS OF CARE     Patient/Medical POA stated Goal of Care: Hospice care    [x] I have reviewed and/or updated ACP information in the Advance Care Planning Navigator. This information is available in the [address] link in the patient's chart header.     Primary Decision Maker (Health Care Agent):     Resuscitation Status: DNR  If DNR is there a Durable DNR on file? : [x] Yes [] No (If no, complete Durable DNR)    HISTORY     History obtained from: Chart, daughter, bedside nurse    CHIEF COMPLAINT: Fever  The patient is:   [] Verbal  [x] Nonverbal  [] Unresponsive    HPI/SUBJECTIVE: Patient is nonverbal.  His eyes are open but he appears to be staring past us. REVIEW OF SYSTEMS     The following systems were: [x] reviewed  [] unable to be reviewed    Positive ROS include:  Constitutional: fatigue, weakness, short of breath  Ears/nose/mouth/throat: increased airway secretions  Respiratory:shortness of breath,   Gastrointestinal:poor appetite,   Musculoskeletal: swelling legs  Neurologic:, weakness      Adult Non-Verbal Pain Assessment Score: 5    Face  [] 0   No particular expression or smile  [x] 1   Occasional grimace, tearing, frowning, wrinkled forehead  [] 2   Frequent grimace, tearing, frowning, wrinkled forehead    Activity (movement)  [] 0   Lying quietly, normal position  [] 1   Seeking attention through movement or slow, cautious movement  [x] 2   Restless, excessive activity and/or withdrawal reflexes    Guarding  [] 0   Lying quietly, no positioning of hands over areas of body  [x] 1   Splinting areas of the body, tense  [] 2   Rigid, stiff    Physiology (vital signs)  [x] 0   Stable vital signs  [] 1   Change in any of the following: SBP > 20mm Hg; HR > 20/minute  [] 2   Change in any of the following: SBP > 30mm Hg; HR > 25/minute    Respiratory  [] 0   Baseline RR/SpO2, compliant with ventilator  [x] 1   RR > 10 above baseline, or 5% drop SpO2, mild asynchrony with ventilator  [] 2   RR > 20 above baseline, or 10% drop SpO2, asynchrony with ventilator     FUNCTIONAL ASSESSMENT     Palliative Performance Scale (PPS): 10       PSYCHOSOCIAL/SPIRITUAL ASSESSMENT     Active Problems:    Sepsis ([address] (9/1/2019)      Past Medical History:   Diagnosis Date    CHF (congestive heart failure) ([address] 9/1/2019    Esophageal cancer ([facility] 9/1/2019    H/O tracheostomy 9/1/2019    History of completed stroke 9/1/2019      No past surgical history on file.    Social History Tobacco Use    Smoking status: Unknown If Ever Smoked   Substance Use Topics    Alcohol use: Not Currently     Family History   Family history unknown: Yes      No Known Allergies   Current Facility-Administered Medications   Medication Dose Route Frequency    LORazepam (ATIVAN) injection 1 mg  1 mg IntraVENous Q15MIN PRN    ondansetron (ZOFRAN) injection 4 mg  4 mg IntraVENous Q4H PRN    ketorolac (TORADOL) injection 30 mg  30 mg IntraVENous Q8H PRN    scopolamine (TRANSDERM-SCOP) 1 mg over 3 days 1 Patch  1 Patch TransDERmal Q72H    glycopyrrolate (ROBINUL) injection 0.2 mg  0.2 mg IntraVENous Q4H    bisacodyl (DULCOLAX) suppository 10 mg  10 mg Rectal DAILY PRN    morphine injection 2 mg  2 mg IntraVENous Q15MIN PRN    morphine injection 2 mg  2 mg IntraVENous Q2H        PHYSICAL EXAM     Wt Readings from Last 3 Encounters:   09/01/19 145 lb (65.8 kg)   07/30/19 145 lb (65.8 kg)   04/03/19 145 lb (65.8 kg)       There were no vitals taken for this visit.     Supplemental O2  [x] Yes  [] NO  Last bowel movement:     Currently this patient has:  [x] Peripheral IV [] PICC  [] PORT [] ICD    [x] Cummings Catheter [] NG Tube   [x] PEG Tube    [] Rectal Tube [] Drain  [] Other:     Constitutional: Ill-appearing, ashen, nonverbal  Eyes: Eyes are open but sunken  ENMT: Trach collar in place  Cardiovascular: Tachycardia  Respiratory: Mild to moderate distress with accessory muscle use noted, secretions noted  Gastrointestinal: Soft, PEG in place  Musculoskeletal: Muscle wasting, emaciated  Skin: Poor skin turgor  Neurologic: Nonverbal  Psychiatric: Restless  Other:       Pertinent Lab and or Imaging Tests:  Lab Results   Component Value Date/Time    Sodium 139 09/01/2019 05:19 PM    Potassium 5.3 (H) 09/01/2019 05:19 PM    Chloride 101 09/01/2019 05:19 PM    CO2 28 09/01/2019 05:19 PM    Anion gap 10 09/01/2019 05:19 PM    Glucose 122 (H) 09/01/2019 05:19 PM    BUN 53 (H) 09/01/2019 05:19 PM    Creatinine 2.12 (H) 09/01/2019 05:19 PM    BUN/Creatinine ratio 25 (H) 09/01/2019 05:19 PM    GFR est AA 37 (L) 09/01/2019 05:19 PM    GFR est non-AA 31 (L) 09/01/2019 05:19 PM    Calcium 8.6 09/01/2019 05:19 PM     Lab Results   Component Value Date/Time    Protein, total 7.4 09/01/2019 05:19 PM    Albumin 2.2 (L) 09/01/2019 05:19 PM           Total time:   Counseling / coordination time:   > 50% counseling / coordination?:

## 2019-09-02 NOTE — PROGRESS NOTES
Bedside and Verbal shift change report given to Alycia Nicole RN (oncoming nurse) by Rush Goetz (offgoing nurse). Report included the following information SBAR, Kardex, Intake/Output, MAR, Accordion and Recent Results.

## 2019-09-02 NOTE — ACP (ADVANCE CARE PLANNING)
Advance Care Planning Note     Name: Elizabeth Rodriguez   YOB: 1945   MRN: [id number]   Admission Date: 9/1/2019  4:57 PM     Date of discussion:  9/2/2019         Active Diagnoses:     Principal Problem:    Sepsis ([facility] (9/1/2019)    Active Problems:    History of completed stroke (9/1/2019)      H/O tracheostomy (9/1/2019)      CHF (congestive heart failure) ([facility] (9/1/2019)      Esophageal cancer ([facility] (9/1/2019)      Septic shock ([facility] (9/1/2019)           These active diagnoses are of sufficient risk that focused discussion on advance care planning is indicated in order to allow the patient to thoughtfully consider personal goals of care; and, if situations arise that prevent the ability to personally give input, to ensure appropriate representation of their personal desires for different levels and aggressiveness of care. Discussion:     Persons present and participating in discussion: Pt's legal next of kin Pradeep Varela via phone      Discussion: Spoke with Pradeep Varela re: current condition of her father and that he is actively dying. She confirmed he is DNR (this is actually scanned into our media section) and that she discussed with the ED MD making him comfort measures. She understands that antibiotics are not adding to comfort. Also discussed possibility of transitioning to inpt hospice if he qualifies. Full note to follow     Time Spent:     Total time spent face-to-face in education and discussion: 17 minutes.              Christine Kidd MD

## 2019-09-02 NOTE — PROGRESS NOTES
Reason for Admission:   sepsis                   RRAT Score:          4           Plan for utilizing home health:      GIP hospice referral                    Current Advanced Directive/Advance Care Plan: DNR,AMD on file                         Transition of Care Plan:                    I discussed pt with attending. Hospice consult received as pt is actively dying per attending. Referral for [facility] hospice sent through the cc link plus I called the consult into hospice @ [phone].     Ivy Joiner

## 2019-09-02 NOTE — PROGRESS NOTES
Spiritual Care Assessment/Progress Note  [address]      NAME: Donna Bailey      MRN: [id number]  AGE: 69 y.o. SEX: male  Scientology Affiliation:    Language: English     9/2/2019     Total Time (in minutes): 12     Spiritual Assessment begun in [facility] 5M1 MED SURG 1 through conversation with:         [x]Patient        [] Family    [] Friend(s)        Reason for Consult: Palliative Care, Initial/Spiritual Assessment     Spiritual beliefs: (Please include comment if needed)     [] Identifies with a jeny tradition:         [] Supported by a jeny community:            [] Claims no spiritual orientation:           [] Seeking spiritual identity:                [] Adheres to an individual form of spirituality:           [x] Not able to assess:                           Identified resources for coping:      [] Prayer                               [] Music                  [] Guided Imagery     [] Family/friends                 [] Pet visits     [] Devotional reading                         [x] Unknown     [] Other:                                              Interventions offered during this visit: (See comments for more details)    Patient Interventions: Other (comment)(EOL)           Plan of Care:     [] Support spiritual and/or cultural needs    [] Support AMD and/or advance care planning process      [] Support grieving process   [] Coordinate Rites and/or Rituals    [] Coordination with community clergy   [] No spiritual needs identified at this time   [] Detailed Plan of Care below (See Comments)  [] Make referral to Music Therapy  [] Make referral to Pet Therapy     [] Make referral to Addiction services  [] Make referral to [facility]  [] Make referral to Spiritual Care Partner  [] No future visits requested        [x] Follow up visits as needed     Comments:     visited patient, Elaina Guo, for a palliative care initial spiritual assessment on the Med. Surgical floor.  Collaborated with Messi's nurse, Abdulkadir Sosa, who informed  that Wojciech Mendoza is an inmate from [facility] and is being transitioned to comfort care. Wojciech Mendoza was lying in bed and appeared to be comfortable. No family was present but two guards were standing at the bedside.  provided silent support and pastoral presence.  will follow up as able and/or needed  [place].      Paging Service: [phone])

## 2019-09-03 NOTE — HOSPICE
[address] [facility] Help to Those in Need  [phone]    [facility] Daily Nursing Note   Patient Name: Daniela Greenberg  YOB: 1945  Age: 69 y.o. Date of Visit: 09/03/19  Facility of Care: [facility]  Patient Room: [facility]     Hospice Attending: Franz Aschoff, MD  Hospice Diagnosis: Sepsis [facility]) [A41.9]    Level of Care: [facility]    Current [facility] Symptoms    1. Appears to be actively dying, unstable for transport  2. Tachycardic  3. Long periods of apnea  4. Unresponsive, eyes open but do not focus or track  5. secretions        ASSESSMENT & PLAN   Must update Plan of Care including visit frequencies for IDT members  1. Continue [facility] level of care at [facility] as patient is too unstable for transport  2. Continue frequent nursing assessments and intervention  3. Continue scheduled IV morphine and robinul for SOB and secretions  4. Administer PRN medications as needed for increased symptoms    Spiritual Interventions: continue  visits as indicated    Psych/ Social/ Emotional Interventions: no family at bedside, daughter unable to travel to be w/ patient    Care Coordination Needs: continue to communicate w/ hospice staff    Care plan and New Orders discussed / approved with Dr. Eunice Gil MD.    Description History and Chart Review   If this is initial [facility] note must document RN assessment/MD communication in previous setting. Specifically document nursing/medication needs in last 24 hours to support [facility] care  Narrative History of last 24 hours that demonstrates care cannot be provided in another setting:  Patient admitted to [facility] LOC under hospice services. IV morphine and robinul scheduled for SOB and secretions. Patient is actively dying and unstable for transport out of the hospital    What has been done to control the patient's symptoms in the last 24 hours? Scheduled IV morphine and robinul to control secretions and SOB.  Assess to ensure effectiveness of medications     Does the patient currently require IV medications? yes  Does the patient currently require scheduled medications? yes  Does the patient currently require a PCA? no      Supporting documentation for GIP need for pain control:  [x] Frequent evaluation by a doctor, nurse practitioner, nurse   [] Frequent medication adjustment    [x] IVs that cannot be administered at home   [] Aggressive pain management   [] Complicated technical delivery of medications              Supporting documentation for GIP need for symptom control:  []  Sudden decline necessitating intensive nursing intervention  []  Uncontrolled / intractable nausea or vomiting   []  Pathological fractures  []  Advanced open wounds requiring frequent skilled care  [] Unmanageable respiratory distress  [] New or worsening delirium   [] Delirium with behavior issues: Is 24 hour caregiver present due to safety concerns with agitation? (yes/no)  [x] Imminent death  with skilled nursing needs documented above    DISCHARGE PLANNING   Daily discharge planning required for GIP  1. Discharge Plan: should patient stabilize, can transition back to correctional facility  2. Patient/Family teaching: no family present  3. Response to patient/family teaching: no response    ASSESSMENT    KARNOFSKY: 10    Prognosis estimated based on 09/03/19 clinical assessment is:   [x] Few to Many Hours  [] Hours to Days   [] Few to Many Days   [] Days to Weeks   [] Few to Many Weeks   [] Weeks to Months   [] Few to Many Months    Quality Measure: Patient self-reports:  [] Yes    [x] No      CLINICAL INFORMATION   No data found.     Currently this patient has:  [x] Supplemental O2 Via trach collar  [x] IV    [] PICC      [] PORT   [] NG Tube    [] PEG Tube   [] Ostomy     [] Cummings draining _______ urine  [] Other:     SIGNS/PHYSICAL FINDINGS     Skin (including wound):  [] Warm, dry, supple, intact and color normal for race  [] Warm   [x] Dry   [x] Cool     [] Clammy       [] Diaphoretic    Turgor   [] Normal   [x] Decreased  Color:   [] Pink   [x] Pale   [] Cyanotic   [] Erythema   [] Jaundice   [] Normal for Race  []  Wounds:    Neuro:  [] Lethargy  [] Restlessness / agitation  [] Confusion / delirium  [] Hallucinations  [] Responds to maximal stimulation  [x] Unresponsive  [] Seizures     Cardiac:  [] Dyspnea on Exertion  [] JVD  [] Murmur  [] Palpitations  [] Hypotension  [] Hypertension  [x] Tachycardia  [] Bradycardia  [] Irregular HR  [] Pulses Decreased  [] Pulses Absent  [] Edema:       (Location, Grade and Pitting)  [] Mottling:      (Location)    Respiratory:  Breath sounds:    [x] Diminished   [] Wheeze   [x] Rhonchi   [] Rales   [] Even and unlabored  [x] Labored:  Shallow inspirations          [] Cough   [] Non Productive   [] Productive    [] Description:           [] Deep suctioned   [] O2 at ___ LPM  [] High flow oxygen greater than 10 LPM  [] Bi-Pap    GI  [] Abdomen (describe)   [] Ascites  [] Nausea  [] Vomiting  [] Incontinent of bowels  [x] Bowel sounds (hypoactive)  [] Diarrhea  [] Constipation (see above including last bowel movement)  [] Checked for impaction  [] Last BM     Nutrition  Diet:__NPO________  Appetite:   [] Good   [] Fair   [] Poor   [] Tube Feeding       [] Voiding  [x] Incontinent   [] Cummings    Musculoskeletal  [] Balance/[place] Unsteady   [] Weak   Strength:    [] Normal    [] Limited    [] Decreasing   Activities:    [] Up as tolerated   [x] Bedridden    [] Specify:    SAFETY  [] 24 hr. Caregiver   [x] Side rails ?     [x] Hospital bed   [] Reviewed Falls & Safety       ALLERGIES AND MEDICATIONS     Allergies: No Known Allergies    Current Facility-Administered Medications   Medication Dose Route Frequency    LORazepam (ATIVAN) injection 1 mg  1 mg IntraVENous Q15MIN PRN    ondansetron (ZOFRAN) injection 4 mg  4 mg IntraVENous Q4H PRN    ketorolac (TORADOL) injection 30 mg  30 mg IntraVENous Q8H PRN    scopolamine (TRANSDERM-SCOP) 1 mg over 3 days 1 Patch  1 Patch TransDERmal Q72H  glycopyrrolate (ROBINUL) injection 0.2 mg  0.2 mg IntraVENous Q4H    bisacodyl (DULCOLAX) suppository 10 mg  10 mg Rectal DAILY PRN    morphine injection 2 mg  2 mg IntraVENous Q15MIN PRN    morphine injection 2 mg  2 mg IntraVENous Q2H          Visit Time In: 0930  Visit Time Out: 5262

## 2019-09-03 NOTE — PROGRESS NOTES
Bedside and Verbal shift change report given to Alice Linda (oncoming nurse) by German Bethea (offgoing nurse). Report included the following information SBAR, Kardex, Intake/Output, MAR and Recent Results.

## 2019-09-03 NOTE — ROUTINE PROCESS
Bedside shift change report given to Hansa Bullock (oncoming nurse) by Pauline Basurto (offgoing nurse). Report included the following information SBAR, Kardex, Procedure Summary, Intake/Output, MAR, Accordion, Recent Results and Med Rec Status.

## 2019-09-03 NOTE — HOSPICE
[facility]  [phone]    Social Work Admission Note  Patient Name: Meghna Palacios  YOB: 1945  Age: 69 y.o. Date of Visit: 19  Facility of Care: [facility]  Patient Room: [facility]/     Hospice Attending: Cristino Santo MD  Hospice Diagnosis: Sepsis [facility]) [A41.9]    Level of Care:    [x]  GIP    []  Respite   []  Routine    NARRATIVE     MSW made initial visit with patient. Patient observed lying in bed unresponsive, appeared to be comfortable. Patient is an inmate at [facility] and guards present in the room with patient. MSW spoke with guard who informed MSW about procedure when patient passes, that patient will have to be processed in their system; also educated that should family want to visit patient prior to death, they would have to contact [facility] to schedule any visits. MSW also called [facility] and spoke with the physician who shared procedure when patients pass at the correctional center. Families are given the option to have a service and arrange for burial on their own or if patient is unclaimed, the state will cremate. The facility will contact patient's family regarding patient's passing and their preferences for final arrangements. MSW called patient's daughter to introduce self and assess coping, there was no answer, MSW left message with contact information. Palliative team shared that patient would likely be released [facility] to [facility] at death as he is still an inmate under their care. ADVANCE CARE PLANNING    Code Status: DNR  Durable DNR: _X_ Yes  _ No  No flowsheet data found.     Relationship Status:  []  Single     []        []      []  Domestic Partner     []  /  []  Common Law  []    [x]  Unknown    If in a relationship, name of partner/spouse:  Duration of relationship:    Synagogue: No Episcopal on file     Home: MSW will follow up with [address] and the family  Resources Provided: none needed at this time     Social Work Initial Assessment     Gender:  male    Race/Ethnicity: (melvi all that apply)  []  American [place] [facility] or [place] Native  []    [x]  Black or [place] American  []   or   []  Native [address] or [place]  []  Molly Newman  []  Unknown      Service:    []  Yes   []  No       [x]  Unknown  Appropriate for Pinning Ceremony:   []  Yes      [x]  No  Is patient using VA benefits?    []  Yes      [x]  No     Primary Language: English  []   Needed  []   utilized during visit    Ability to express thoughts/needs/feelings  []  Expressed thoughts/feelings/needs without difficulty  []  Requires extra time and cuing  []  Speech limited single words  []  Uses only gestures (eye, blinking eye or head movement/pointing)  []  Unable to express thoughts/feelings/needs (speech unintelligible or inappropriate)  [x]  Unresponsive  Notes:      Mental Status:  []  Alert-oriented to:     []  Person     []  Place     []  Time  []  Comatose-responds to:    []   Verbal stimuli    []  Tactile stimuli    []  Painful stimuli  []  Forgetful  []  Disoriented/Confused  []  Lethargic  []  Agitated  [x]  Other (specify):  Patient is unresponsive  Notes:      Patients description of Illness/Current Health Status:    [x]  Patient unable to discuss  []  Patient unwilling to discuss  []  (Specify)        Knowledge/Understanding of Disease Process  Patient:    []  Demonstrates knowledge/understanding of disease process   []  Demonstrates knowledge/understanding of treatment plan   []  Demonstrates knowledge/understanding of prognosis   []  Demonstrates acceptance of prognosis   []  Demonstrates knowledge/understanding of resuscitation status   []  Other (specify)  Caregiver:   []  Demonstrates knowledge/understanding of disease process   []  Demonstrates knowledge/understanding of treatment plan   []  Demonstrates knowledge/understanding of prognosis   []  Demonstrates acceptance of prognosis   []  Demonstrates knowledge/understanding of resuscitation status   []  Other (specify)  Notes:      Patients living arrangement/care setting:  Use the PRIOR COLUMN when the PATIENTS current health status necessitated a change in his/her primary residence. Prior Current Response              []             []    Patients own home/residence              []             []    Home of family member/friend              []             []    Boarding home              []             []    Assisted living facility/detention center              []             []    Hospital/Acute care facility              []             []    Skilled nursing facility              []             []    Long term care facility/Nursing home              []             [x]    Hospice in Patient      Primary Caregiver:  []  No Primary Caregiver  Name of Primary Caregiver: [address] [place] [address]  Relationship or Primary Caregiver:    []  Spouse/Significant other       []  Natural Child        []  Step child       []  Sibling   []  Parent   []  Friend/Neighbor   []  Community/Scientologist Volunteer   []  Paid help   []  Other (specify):___________  Notes:       Family members/Significant others:  Name:  Emeraldsanjuana Escobar  Relationship:  daughter  Phone Number:  [phone]  Actively involved in care? []  Yes  [x]  No    Name:  Chris Cowart  Relationship: brother  Phone Number: [phone]  Actively involved in care? []  Yes  [x]  No    Name:  Relationship:  Phone Number:  Actively involved in care?   []  Yes  []  No    Social support systems: (select ONE best description)  []  Excellent social support system which includes three or more family members or friends  []  Good social support system which includes two or less members or friends  []  Joselo August support which includes one family member or friend  []  Poor social support; no family members or friends; basically ALONE  Notes:      Emotional Status: (melvi all that apply)    Patient Caregiver Response                 []                []    Mood/Affect stable and appropriate                   []                []    Angry                 []                []    Anxious                 []                []    Apprehensive                 []                []    Avoidant                 []                []    Clinging                 []                []    Depressed                 []                []    Distraught                 []                []    Elated                 []                []    Euphoric                 []                []    Fearful                 []                []    Flat Affect                 []                []    Helpless                 []                []    Hostile                 []                []    Impulsive                 []                []    Irritable                 []                []    Labile                 []                []    Manic                 []                []    Restlessness                 []                []    Sad                 []                []    Suspicious                 []                []    Tearful                 []                []    Withdrawn     Notes: Patient is unresponsive    Coping Skills (strengths/weakness):    Patient: Coping Skills (strength/weakness): Patient is an inmate at [facility], family resides out of state, not a part of patient's daily care.    Family/caregiver (strength/weakness):     [place] of care (melvi all that apply):     [x]  No burden evident   []  Family must administer medications   []  Illness causing financial strain   []  Family/Support feels overwhelmed   []  Family/Support sleep disturbed with patients care   []  Patients care causes extra physical stress  of death  []  Illness causes changes in family lifestyle  []  Illness impacting family/support employment  []  Family experiencing increased time demands  []  Patients behavior endangers family  []  Denial of patients illness  []  Concern over outcome of illness/fear  []  Patients behavior embarrassing to family   Notes:      Risk Factors: (melvi all that apply):    [x]  No burden evident   []  Alcohol abuse  []  Financial resources inadequate to meet basic needs (food/house/etc)  []  Financial resources inadequate to meet health care needs (supplies/equipment/medications)  []  Food/nutrition resources inadequate  []  Home environment unsafe/inadequate for home care  []  Homicidal risk  []  Lives alone or without concerned relatives  []  Multiple medications/complex schedule  []  Physical limitations increase likelihood of falls  []  Plan of care/treatments complicated  []  Substance use/abuse  []  Suicidal risk  []  Visual impairment threatens safety/ability to perform self-care  []  Other (specify):     Abuse/Neglect (actual/potential risks):  [x]  No signs of abuse/neglect  []  History of abuse/neglect                 []  [id number] []  Sexual  []  History of domestic violence  []  Lacks adequate physical care  []  Lacks emotional nurturing/support  []  Lacks appropriate stimulation/cognitive experiences  []  Left alone inappropriately  []  Lacks necessary supervision  []  Inadequate or delayed medical care  []  Unsafe environment (i.e guns/drug use/history of violence in the home/etc.)  []  Bruising or other physical signs of injury present  []  Other (specify):  Notes:   []  Refer to child/adult protective services      Current Sources of Stress (in Addition to Current Illness):   [x]  None reported  []  Bills/Debt    []  Career/Job change    []   (short term)    []   (long term)    []  Death of a child (recent)    []  Death of a parent (recent)   []  Death of a spouse (recent)   []  Employment status changed   []  Family discord    []  Financial loss/Inadequate inther (specify):come  []  Job loss  []  Legal issues unresolved  []  Lifestyle change  []  Marital discord  []  Marriage within the last year  []  Paperwork (insurance/legal/etc) overwhelming  []  Separation/Divorce  []  Other (specify):  Notes:      Current [employer] Being Utilized     1. Interventions/Plan of Care     1. Assess social and emotional factors related to coping with end of life issues  2. Community resource planning/referral   3. Relocation to different care setting if/when symptoms stabilize      Discharge Planning     1. Patient is imminent, likely to pass in the hospital. Patient to be discharged to [facility] if he stabilizes for hospice care there.      MSW Assessment Completed by: Kim Brown  09/03/19    Time In:  4.00 pm      Time Out:  5.00 pm

## 2019-09-04 NOTE — PROGRESS NOTES
Bedside and Verbal shift change report given to [address] rn  (oncoming nurse) by Senia Hendrickson  (offgoing nurse). Report included the following information SBAR and Kardex.

## 2019-09-04 NOTE — HOSPICE
[facility]  [phone]     Patient Name: Sly Seo  YOB: 1945  Age: 69 y.o. [facility] RN Note:  received call form Afia Terry, liaison from [employer]. She is handling Mr. Murillo's care from the DOC's side. She states that she will need clinical updates regarding the patient's care while he is GIP under [facility]. She will reach out to the [address] [facility] [email or web address] office line to recieve updates. Og Block can be reached at [phone]. Her fax is [phone]. Thank you for the opportunity to be of service to this patient.

## 2019-09-04 NOTE — HOSPICE
This was an initial visit to assess need and offer support. Mr Dat Ledezma was unable to talk to me but opened his eye as I assured him of our concern and desire to support him and his loved ones. Prayer offered at bedside. Attempted to call his daughter but did not get an answer.

## 2019-09-04 NOTE — PROGRESS NOTES
[facility] Those in Need  [phone]    Patient Name: Elizabeth Rodriguez  YOB: 1945    Date of Provider Hospice Visit: 09/04/19    Level of Care:   [x] General Inpatient (GIP)    [] Routine   [] Respite    Current Location of Care:  [] [facility] [x] [facility] [] [facility] [] [facility] [] H[facility]ice House [facility], patient referred from:  [] [facility] [] [facility] [] [facility] [] [facility] [] Home [] Other:     Date of Original Hospice Admission: 9/2/2019  Hospice Medical Director at time of admission: iTMan Diagnosis: Sepsis  Diagnoses RELATED to the terminal prognosis: Esophageal cancer, massive stroke, nonverbal, trach and PEG, severe protein malnutrition  Other Diagnoses:      HOSPICE SUMMARY     Elizabeth Rodriguez is a 69y.o. year old who was admitted to [facility]. Patient is a 70-year-old gentleman who has a [place] correctional inmate. Patient with a history of extensive stroke that has left him nonverbal and he also has required a PEG and a trach. He has a history of esophageal cancer which  unfortunately do not know a lot of the significant details. He presents to the emergency room with sepsis, source unknown. Patient hypotensive but with significant issues with secretions and shortness of breath. After discussion with his daughter via phone, agreement and transition to inpatient hospice care to focus on his comfort. Patient an inmate in the correctional facility. We had to obtain consents verbally via phone secondary to his daughter living in [address] [place] [address] and unable to travel to the hospital.  In addition, we would need permission from the correctional facility to allow visitation to the hospital.  After discussion with his daughter, she was not going to be able to travel to [place] secondary to her work schedule. She states she had seen him earlier this year and was okay with the focus to be comfort.     The patient's principle diagnosis has resulted in minimally responsive  Refer to LCD     Functionally, the patient's Karnofsky and/or Palliative Performance Scale has declined over a period of days to weeks and is estimated at 10. The patient is dependent on the following ADLs: All    Objective information that support this patients limited prognosis includes:  Fever to 105  Albumin of 2.2   chest x-ray with diffuse patchy infiltrates edema versus infection-      The patient/family chose comfort measures with the support of Hospice. HOSPICE DIAGNOSES   Active Symptoms:  1. Shortness of breath  2. Secretions  3. Restlessness     PLAN      1.  and SW to support family needs  2. Patient will continue GIP level care secondary to frequent nursing assessment needs as well as IV medications to manage symptoms. Patient's eyes open but not tracking. 3. Continue morphine 2 mg IV every 2 hours scheduled and every 15 minutes as needed for pain and shortness of breath. No PRN doses needed  4. Scopolamine patch and Robinul 0.2 mill grams IV every 4 hours scheduled for secretions  5. Comfort order set placed for all other symptoms to include fever, nausea, agitation  6. Plan reviewed with bedside nurse, Bradley rolle  7. Disposition: Likely will die in the hospital    Prognosis estimated based on 09/04/19 clinical assessment is:   [x] Hours to Days    [] Days to Weeks    [] Other:    Communicated plan of care with: Hospice Case Manager; Hospice IDT; Care Team     GOALS OF CARE     Patient/Medical POA stated Goal of Care: Hospice care    [x] I have reviewed and/or updated ACP information in the Advance Care Planning Navigator. This information is available in the [address] link in the patient's chart header.     Primary Decision Maker ([address]):     Resuscitation Status: DNR  If DNR is there a Durable DNR on file? : [x] Yes [] No (If no, complete Durable DNR)    HISTORY     History obtained from: Chart, daughter, bedside nurse    CHIEF COMPLAINT: Fever  The patient is:   [] Verbal  [x] Nonverbal  [] Unresponsive    HPI/SUBJECTIVE: Patient is nonverbal.  His eyes are open but he appears to be staring past us.    9/3minimally responsive. Eyes are still open at times but do not appear to be tracking    9/4-patient minimally responsive.  Secretions are less       REVIEW OF SYSTEMS     The following systems were: [x] reviewed  [] unable to be reviewed    Positive ROS include:  Constitutional: fatigue, weakness, short of breath  Ears/nose/mouth/throat: increased airway secretions  Respiratory:shortness of breath,   Gastrointestinal:poor appetite,   Musculoskeletal: swelling legs  Neurologic:, weakness      Adult Non-Verbal Pain Assessment Score:2    Face  [] 0   No particular expression or smile  [x] 1   Occasional grimace, tearing, frowning, wrinkled forehead  [] 2   Frequent grimace, tearing, frowning, wrinkled forehead    Activity (movement)  [] 0   Lying quietly, normal position  [x] 1   Seeking attention through movement or slow, cautious movement  [] 2   Restless, excessive activity and/or withdrawal reflexes    Guarding  [x] 0   Lying quietly, no positioning of hands over areas of body  [] 1   Splinting areas of the body, tense  [] 2   Rigid, stiff    Physiology (vital signs)  [x] 0   Stable vital signs  [] 1   Change in any of the following: SBP > 20mm Hg; HR > 20/minute  [] 2   Change in any of the following: SBP > 30mm Hg; HR > 25/minute    Respiratory  [x] 0   Baseline RR/SpO2, compliant with ventilator  [] 1   RR > 10 above baseline, or 5% drop SpO2, mild asynchrony with ventilator  [] 2   RR > 20 above baseline, or 10% drop SpO2, asynchrony with ventilator     FUNCTIONAL ASSESSMENT     Palliative Performance Scale (PPS): 10       PSYCHOSOCIAL/SPIRITUAL ASSESSMENT     Active Problems:    Sepsis ([facility] (9/1/2019)      Past Medical History:   Diagnosis Date    CHF (congestive heart failure) ([facility] 9/1/2019    Esophageal cancer ([facility] [address] 9/1/2019  H/O tracheostomy 9/1/2019    History of completed stroke 9/1/2019      No past surgical history on file.    Social History     Tobacco Use    Smoking status: Unknown If Ever Smoked   Substance Use Topics    Alcohol use: Not Currently     Family History   Family history unknown: Yes      No Known Allergies   Current Facility-Administered Medications   Medication Dose Route Frequency    LORazepam (ATIVAN) injection 1 mg  1 mg IntraVENous Q15MIN PRN    ondansetron (ZOFRAN) injection 4 mg  4 mg IntraVENous Q4H PRN    scopolamine (TRANSDERM-SCOP) 1 mg over 3 days 1 Patch  1 Patch TransDERmal Q72H    glycopyrrolate (ROBINUL) injection 0.2 mg  0.2 mg IntraVENous Q4H    bisacodyl (DULCOLAX) suppository 10 mg  10 mg Rectal DAILY PRN    morphine injection 2 mg  2 mg IntraVENous Q15MIN PRN    morphine injection 2 mg  2 mg IntraVENous Q2H        PHYSICAL EXAM     Wt Readings from Last 3 Encounters:   09/01/19 145 lb (65.8 kg)   07/30/19 145 lb (65.8 kg)   04/03/19 145 lb (65.8 kg)       Visit Vitals  /78 (BP 1 Location: Left arm, BP Patient Position: At rest)   Pulse (!) 107   Temp 97.6 °F (36.4 °C)   Resp 17   SpO2 96%       Supplemental O2  [x] Yes  [] NO  Last bowel movement:     Currently this patient has:  [x] Peripheral IV [] PICC  [] PORT [] ICD    [x] Cummings Catheter [] NG Tube   [x] PEG Tube    [] Rectal Tube [] Drain  [] Other:     Constitutional: Ill-appearing, ashen, nonverbal  Eyes: Eyes are open but sunken  ENMT: Trach collar in place  Cardiovascular: Tachycardia  Respiratory: Breathing has slowed, minimal secretions  Gastrointestinal: Soft, PEG in place  Musculoskeletal: Muscle wasting, emaciated  Skin: Poor skin turgor  Neurologic: Nonverbal  Psychiatric: Calm  Other:       Pertinent Lab and or Imaging Tests:  Lab Results   Component Value Date/Time    Sodium 139 09/01/2019 05:19 PM    Potassium 5.3 (H) 09/01/2019 05:19 PM    Chloride 101 09/01/2019 05:19 PM    CO2 28 09/01/2019 05:19 PM Anion gap 10 09/01/2019 05:19 PM    Glucose 122 (H) 09/01/2019 05:19 PM    BUN 53 (H) 09/01/2019 05:19 PM    Creatinine 2.12 (H) 09/01/2019 05:19 PM    BUN/Creatinine ratio 25 (H) 09/01/2019 05:19 PM    GFR est AA 37 (L) 09/01/2019 05:19 PM    GFR est non-AA 31 (L) 09/01/2019 05:19 PM    Calcium 8.6 09/01/2019 05:19 PM     Lab Results   Component Value Date/Time    Protein, total 7.4 09/01/2019 05:19 PM    Albumin 2.2 (L) 09/01/2019 05:19 PM           Total time:   Counseling / coordination time:   > 50% counseling / coordination?:

## 2019-09-04 NOTE — PROGRESS NOTES
[facility] Help to Those in Need  [phone]    Patient Name: Daniela Greenberg  YOB: 1945    Date of Provider Hospice Visit: 09/03/19    Level of Care:   [x] General Inpatient (GIP)    [] Routine   [] Respite    Current Location of Care:  [] [facility] [x] [facility] [] [facility] [] [facility] [] [facility], patient referred from:  [] [facility] [] [facility] [] [facility] [] [facility] [] Home [] Other:     Date of Original Hospice Admission: 9/2/2019  Hospice Medical Director at time of admission: Marley Spoon Diagnosis: Sepsis  Diagnoses RELATED to the terminal prognosis: Esophageal cancer, massive stroke, nonverbal, trach and PEG, severe protein malnutrition  Other Diagnoses:      HOSPICE SUMMARY     Daniela Greenberg is a 69y.o. year old who was admitted to [facility]. Patient is a 72-year-old gentleman who has a [employer] correctional inmate. Patient with a history of extensive stroke that has left him nonverbal and he also has required a PEG and a trach. He has a history of esophageal cancer which  unfortunately do not know a lot of the significant details. He presents to the emergency room with sepsis, source unknown. Patient hypotensive but with significant issues with secretions and shortness of breath. After discussion with his daughter via phone, agreement and transition to inpatient hospice care to focus on his comfort. Patient an inmate in the correctional facility. We had to obtain consents verbally via phone secondary to his daughter living in [place] and unable to travel to the hospital.  In addition, we would need permission from the correctional facility to allow visitation to the hospital.  After discussion with his daughter, she was not going to be able to travel to [place] secondary to her work schedule. She states she had seen him earlier this year and was okay with the focus to be comfort.     The patient's principle diagnosis has resulted in minimally responsive  Refer to LCD     Functionally, the patient's Karnofsky and/or Palliative Performance Scale has declined over a period of days to weeks and is estimated at 10. The patient is dependent on the following ADLs: All    Objective information that support this patients limited prognosis includes:  Fever to 105  Albumin of 2.2   chest x-ray with diffuse patchy infiltrates edema versus infection-      The patient/family chose comfort measures with the support of Hospice. HOSPICE DIAGNOSES   Active Symptoms:  1. Shortness of breath  2. Secretions  3. Restlessness     PLAN      1.  and SW to support family needs  2. Patient will continue [facility] level care secondary to frequent nursing assessment needs as well as IV medications to manage symptoms. 3. Continue morphine 2 mg IV every 2 hours scheduled and every 15 minutes as needed for pain and shortness of breath. No PRN doses needed  4. Scopolamine patch and Robinul 0.2 mill grams IV every 4 hours scheduled for secretions  5. Comfort order set placed for all other symptoms to include fever, nausea, agitation  6. Plan reviewed with bedside nurse, [address] [place] [address], Lauretta Kocher   7. Disposition: Likely will die in the hospital    Prognosis estimated based on 09/03/19 clinical assessment is:   [x] Hours to Days    [] Days to Weeks    [] Other:    Communicated plan of care with: Hospice Case Manager; Hospice IDT; Care Team     GOALS OF CARE     Patient/Medical POA stated Goal of Care: Hospice care    [x] I have reviewed and/or updated ACP information in the Advance Care Planning Navigator. This information is available in the [address] link in the patient's chart header.     Primary Decision Maker ([address]):     Resuscitation Status: DNR  If DNR is there a Durable DNR on file? : [x] Yes [] No (If no, complete Durable DNR)    HISTORY     History obtained from: Chart, daughter, bedside nurse    CHIEF COMPLAINT: Fever  The patient is:   [] Verbal  [x] Nonverbal  [] Unresponsive    HPI/SUBJECTIVE: Patient is nonverbal.  His eyes are open but he appears to be staring past us.    9/3minimally responsive.   Eyes are still open at times but do not appear to be tracking       REVIEW OF SYSTEMS     The following systems were: [x] reviewed  [] unable to be reviewed    Positive ROS include:  Constitutional: fatigue, weakness, short of breath  Ears/nose/mouth/throat: increased airway secretions  Respiratory:shortness of breath,   Gastrointestinal:poor appetite,   Musculoskeletal: swelling legs  Neurologic:, weakness      Adult Non-Verbal Pain Assessment Score:2    Face  [] 0   No particular expression or smile  [x] 1   Occasional grimace, tearing, frowning, wrinkled forehead  [] 2   Frequent grimace, tearing, frowning, wrinkled forehead    Activity (movement)  [] 0   Lying quietly, normal position  [x] 1   Seeking attention through movement or slow, cautious movement  [] 2   Restless, excessive activity and/or withdrawal reflexes    Guarding  [x] 0   Lying quietly, no positioning of hands over areas of body  [] 1   Splinting areas of the body, tense  [] 2   Rigid, stiff    Physiology (vital signs)  [x] 0   Stable vital signs  [] 1   Change in any of the following: SBP > 20mm Hg; HR > 20/minute  [] 2   Change in any of the following: SBP > 30mm Hg; HR > 25/minute    Respiratory  [x] 0   Baseline RR/SpO2, compliant with ventilator  [] 1   RR > 10 above baseline, or 5% drop SpO2, mild asynchrony with ventilator  [] 2   RR > 20 above baseline, or 10% drop SpO2, asynchrony with ventilator     FUNCTIONAL ASSESSMENT     Palliative Performance Scale (PPS): 10       PSYCHOSOCIAL/SPIRITUAL ASSESSMENT     Active Problems:    Sepsis ([address] (9/1/2019)      Past Medical History:   Diagnosis Date    CHF (congestive heart failure) ([facility] 9/1/2019    Esophageal cancer ([facility] 9/1/2019    H/O tracheostomy 9/1/2019    History of completed stroke 9/1/2019      No past surgical history on file.    Social History     Tobacco Use    Smoking status: Unknown If Ever Smoked   Substance Use Topics    Alcohol use: Not Currently     Family History   Family history unknown: Yes      No Known Allergies   Current Facility-Administered Medications   Medication Dose Route Frequency    LORazepam (ATIVAN) injection 1 mg  1 mg IntraVENous Q15MIN PRN    ondansetron (ZOFRAN) injection 4 mg  4 mg IntraVENous Q4H PRN    ketorolac (TORADOL) injection 30 mg  30 mg IntraVENous Q8H PRN    scopolamine (TRANSDERM-SCOP) 1 mg over 3 days 1 Patch  1 Patch TransDERmal Q72H    glycopyrrolate (ROBINUL) injection 0.2 mg  0.2 mg IntraVENous Q4H    bisacodyl (DULCOLAX) suppository 10 mg  10 mg Rectal DAILY PRN    morphine injection 2 mg  2 mg IntraVENous Q15MIN PRN    morphine injection 2 mg  2 mg IntraVENous Q2H        PHYSICAL EXAM     Wt Readings from Last 3 Encounters:   09/01/19 145 lb (65.8 kg)   07/30/19 145 lb (65.8 kg)   04/03/19 145 lb (65.8 kg)       Visit Vitals  BP 90/69 (BP 1 Location: Right arm)   Pulse 76   Temp 96.5 °F (35.8 °C)   Resp 10   SpO2 97%       Supplemental O2  [x] Yes  [] NO  Last bowel movement:     Currently this patient has:  [x] Peripheral IV [] PICC  [] PORT [] ICD    [x] Cummings Catheter [] NG Tube   [x] PEG Tube    [] Rectal Tube [] Drain  [] Other:     Constitutional: Ill-appearing, ashen, nonverbal  Eyes: Eyes are open but sunken  ENMT: Trach collar in place  Cardiovascular: Tachycardia  Respiratory: Breathing much calmer today, no accessory muscle use noted  Gastrointestinal: Soft, PEG in place  Musculoskeletal: Muscle wasting, emaciated  Skin: Poor skin turgor  Neurologic: Nonverbal  Psychiatric: Calmer  Other:       Pertinent Lab and or Imaging Tests:  Lab Results   Component Value Date/Time    Sodium 139 09/01/2019 05:19 PM    Potassium 5.3 (H) 09/01/2019 05:19 PM    Chloride 101 09/01/2019 05:19 PM    CO2 28 09/01/2019 05:19 PM    Anion gap 10 09/01/2019 05:19 PM    Glucose 122 (H) 09/01/2019 05:19 PM    BUN 53 (H) 09/01/2019 05:19 PM    Creatinine 2.12 (H) 09/01/2019 05:19 PM    BUN/Creatinine ratio 25 (H) 09/01/2019 05:19 PM    GFR est AA 37 (L) 09/01/2019 05:19 PM    GFR est non-AA 31 (L) 09/01/2019 05:19 PM    Calcium 8.6 09/01/2019 05:19 PM     Lab Results   Component Value Date/Time    Protein, total 7.4 09/01/2019 05:19 PM    Albumin 2.2 (L) 09/01/2019 05:19 PM           Total time:   Counseling / coordination time:   > 50% counseling / coordination?:

## 2019-09-04 NOTE — PROGRESS NOTES
Bedside shift change report given to Epifanio Childress (oncoming nurse) by Chet Bautista RN (offgoing nurse). Report included the following information SBAR, Kardex and MAR.

## 2019-09-04 NOTE — HOSPICE
[facility] Those in [facility]  [phone]    [facility] Daily Nursing Note   Patient Name: Flo Ferguson  YOB: 1945  Age: 69 y.o. Date of Visit: 09/04/19  Facility of Care: [facility]  Patient Room: [facility]/     Hospice Attending: Mignon Kothari MD  Hospice Diagnosis: Sepsis [facility]) [A41.9]    Level of Care: GIP    Current GIP Symptoms    1. Minimal responsiveness, will occasionally open eyes to stimuli  2. Audible secretions, being managed w/ scheduled IV Robinul  3. Shallow inspirations w/ periods of apnea  4. Remains to unstable for transport, requires frequent nursing assessments        ASSESSMENT & PLAN   Must update Plan of Care including visit frequencies for IDT members  1. Remove trach collar, as there is no evidence that it is providing any palliative/comfort measures at this pont  2. Continue to update mpoa/daughter on POC   3. Continue scheduled IV medications for management of secretions and SOB  4. Remain hospice under GIP LOC        Spiritual Interventions: hospice chaplain visits as indicated    Psych/ Social/ Emotional Interventions: hospice MSW visits as indicated    Care Coordination Needs: continue to coordinate care w/ hospice team    Care plan and New Orders discussed / approved with Dr. Hemalatha Alejandro MD.    Description History and Chart Review   If this is initial GIP note must document RN assessment/MD communication in previous setting. Specifically document nursing/medication needs in last 24 hours to support GIP care  Narrative History of last 24 hours that demonstrates care cannot be provided in another setting:  Patient GIP Hospice at [facility]. Patient is too unstable for transport out of the hospital, receiving scheduled IV medications that cannot be performed outside a hospital setting    What has been done to control the patient's symptoms in the last 24 hours?   Continue scheduled IV medications for management of secretions and SOB    Does the patient currently require IV medications? yes  Does the patient currently require scheduled medications? yes  Does the patient currently require a PCA? no        Supporting documentation for GIP need for pain control:  [x] Frequent evaluation by a doctor, nurse practitioner, nurse   [] Frequent medication adjustment    [x] IVs that cannot be administered at home   [] Aggressive pain management   [] Complicated technical delivery of medications              Supporting documentation for GIP need for symptom control:  []  Sudden decline necessitating intensive nursing intervention  []  Uncontrolled / intractable nausea or vomiting   []  Pathological fractures  []  Advanced open wounds requiring frequent skilled care  [] Unmanageable respiratory distress  [] New or worsening delirium   [] Delirium with behavior issues: Is 24 hour caregiver present due to safety concerns with agitation? (yes/no)  [x] Imminent death  with skilled nursing needs documented above    DISCHARGE PLANNING   Daily discharge planning required for GIP  1. Discharge Plan: should patient stabilize, can transition back to correctional facility  2. Patient/Family teaching: no family able for teaching  3.  Response to patient/family teaching: not present    ASSESSMENT    KARNOFSKY: 10    Prognosis estimated based on 09/04/19 clinical assessment is:   [x] Few to Many Hours  [] Hours to Days   [] Few to Many Days   [] Days to Weeks   [] Few to Many Weeks   [] Weeks to Months   [] Few to Many Months    Quality Measure: Patient self-reports:  [] Yes    [x] No        CLINICAL INFORMATION     Patient Vitals for the past 12 hrs:   Temp Pulse Resp BP SpO2   09/04/19 0742 97.6 °F (36.4 °C) (!) 107 17 100/78 96 %       Currently this patient has:  [] Supplemental O2   [x] IV    [] PICC      [] PORT   [] NG Tube    [] PEG Tube   [] Ostomy     [x] Cummings draining __dark, yellow, hazy_____ urine  [] Other:     SIGNS/PHYSICAL FINDINGS     Skin (including wound):  [] Warm, dry, supple, intact and color normal for race  [] Warm   [x] Dry   [x] Cool     [] Clammy       [] Diaphoretic    Turgor   [] Normal   [] Decreased  Color:   [] Pink   [] Pale   [] Cyanotic   [] Erythema   [] Jaundice   [x] Normal for Race  []  Wounds:    Neuro:  [] Lethargy  [] Restlessness / agitation  [] Confusion / delirium  [] Hallucinations  [x] Responds to maximal stimulation  [] Unresponsive  [] Seizures     Cardiac:  [] Dyspnea on Exertion  [] JVD  [] Murmur  [] Palpitations  [] Hypotension  [] Hypertension  [] Tachycardia  [] Bradycardia  [] Irregular HR  [] Pulses Decreased  [] Pulses Absent  [x] Edema:     diffuse  [] Mottling:      (Location)    Respiratory:  Breath sounds:    [x] Diminished   [] Wheeze   [] Rhonchi   [] Rales   [] Even and unlabored  [x] Labored:  Shallow inspirations w/ periods of apnea          [] Cough   [] Non Productive   [] Productive    [] Description:           [] Deep suctioned   [] O2 at ___ LPM  [] High flow oxygen greater than 10 LPM  [] Bi-Pap    GI  [] Abdomen (describe)   [] Ascites  [] Nausea  [] Vomiting  [] Incontinent of bowels  [x] Bowel sounds hypoactive  [] Diarrhea  [] Constipation (see above including last bowel movement)  [] Checked for impaction  [] Last BM     Nutrition  Diet:_NPO_________  Appetite:   [] Good   [] Fair   [] Poor   [] Tube Feeding       [] Voiding  [] Incontinent   [x] Cummings    Musculoskeletal  [] Balance/Ira Unsteady   [] Weak   Strength:    [] Normal    [] Limited    [] Decreasing   Activities:    [] Up as tolerated   [x] Bedridden    [] Specify:    SAFETY  [] 24 hr. Caregiver   [x] Side rails ?     [x] Hospital bed   [] Reviewed Falls & Safety       ALLERGIES AND MEDICATIONS     Allergies: No Known Allergies    Current Facility-Administered Medications   Medication Dose Route Frequency    LORazepam (ATIVAN) injection 1 mg  1 mg IntraVENous Q15MIN PRN    ondansetron (ZOFRAN) injection 4 mg  4 mg IntraVENous Q4H PRN    ketorolac (TORADOL) injection 30 mg  30 mg IntraVENous Q8H PRN    scopolamine (TRANSDERM-SCOP) 1 mg over 3 days 1 Patch  1 Patch TransDERmal Q72H    glycopyrrolate (ROBINUL) injection 0.2 mg  0.2 mg IntraVENous Q4H    bisacodyl (DULCOLAX) suppository 10 mg  10 mg Rectal DAILY PRN    morphine injection 2 mg  2 mg IntraVENous Q15MIN PRN    morphine injection 2 mg  2 mg IntraVENous Q2H          Visit Time In: 0900  Visit Time Out: 6821

## 2019-09-05 NOTE — PROGRESS NOTES
[facility] Help to Those in Need  [phone]    Patient Name: Sally Riggs  YOB: 1945    Date of Provider Hospice Visit: 09/05/19    Level of Care:   [x] General Inpatient (GIP)    [] Routine   [] Respite    Current Location of Care:  [] [address] [place] [address] [x] [facility] [] [facility] [] [facility] [] Hospice House [facility]    IF [facility], patient referred from:  [] [address] [place] [address] [] [facility] [] [facility] [] [facility] [] Home [] Other:     Date of Original Hospice Admission: 9/2/2019  Hospice Medical Director at time of admission: Decisive BI Diagnosis: Sepsis  Diagnoses RELATED to the terminal prognosis: Esophageal cancer, massive stroke, nonverbal, trach and PEG, severe protein malnutrition  Other Diagnoses:      HOSPICE SUMMARY     Sally Riggs is a 69y.o. year old who was admitted to [facility]. Patient is a 82-year-old gentleman who has a [place] correctional inmate. Patient with a history of extensive stroke that has left him nonverbal and he also has required a PEG and a trach. He has a history of esophageal cancer which  unfortunately do not know a lot of the significant details. He presents to the emergency room with sepsis, source unknown. Patient hypotensive but with significant issues with secretions and shortness of breath. After discussion with his daughter via phone, agreement and transition to inpatient hospice care to focus on his comfort. Patient an inmate in the correctional facility. We had to obtain consents verbally via phone secondary to his daughter living in [place] and unable to travel to the hospital.  In addition, we would need permission from the correctional facility to allow visitation to the hospital.  After discussion with his daughter, she was not going to be able to travel to [place] secondary to her work schedule. She states she had seen him earlier this year and was okay with the focus to be comfort.     The patient's principle diagnosis has resulted in minimally responsive  Refer to LCD     Functionally, the patient's Karnofsky and/or Palliative Performance Scale has declined over a period of days to weeks and is estimated at 10. The patient is dependent on the following ADLs: All    Objective information that support this patients limited prognosis includes:  Fever to 105  Albumin of 2.2   chest x-ray with diffuse patchy infiltrates edema versus infection-      The patient/family chose comfort measures with the support of Hospice. HOSPICE DIAGNOSES   Active Symptoms:  1. Shortness of breath  2. Secretions  3. Restlessness  4. Blepharitis     PLAN      1.  and SW to support family needs  2. Patient will remain GIP level care because of his nursing needs and ongoing IV medication for management of symptoms. Patient actually has lived much longer than expected given that when he was admitted under hospice he had a blood pressure in the upper 35J low 19V systolic. I still do not feel comfortable with attempting to transition him back to [employer] facility given the limitations that they have with management of hospice patients. 3. Continue morphine 2 mg IV every 2 hours scheduled and every 15 minutes as needed for pain and shortness of breath. No PRN doses needed  4. Scopolamine patch and Robinul 0.2 mill grams IV every 4 hours scheduled for secretions  5. We will add eyedrops for comfort  6. Comfort order set placed for all other symptoms to include fever, nausea, agitation  7. Plan reviewed with bedside nurse, Robyn-hospice nurse  8. Disposition: Likely will die in the hospital    Prognosis estimated based on 09/05/19 clinical assessment is:   [x] Hours to Days    [] Days to Weeks    [] Other:    Communicated plan of care with: Hospice Case Manager;  Hospice IDT; Care Team     GOALS OF CARE     Patient/Medical POA stated Goal of Care: Hospice care    [x] I have reviewed and/or updated ACP information in the Advance Care Planning Navigator. This information is available in the [facility] [address] link in the patient's chart header. Primary Decision Maker (Health Care Agent):     Resuscitation Status: DNR  If DNR is there a Durable DNR on file? : [x] Yes [] No (If no, complete Durable DNR)    HISTORY     History obtained from: Chart, daughter, bedside nurse    CHIEF COMPLAINT: Fever  The patient is:   [] Verbal  [x] Nonverbal  [] Unresponsive    HPI/SUBJECTIVE: Patient is nonverbal.  His eyes are open but he appears to be staring past us.    9/3minimally responsive. Eyes are still open at times but do not appear to be tracking    9/4-patient minimally responsive. Secretions are less    9/5 patient's eyes remain open. Does appear to attempt to grab my hand with his right hand.   Not tracking       REVIEW OF SYSTEMS     The following systems were: [x] reviewed  [] unable to be reviewed    Positive ROS include:  Constitutional: fatigue, weakness, short of breath  Ears/nose/mouth/throat: increased airway secretions  Respiratory:shortness of breath,   Gastrointestinal:poor appetite,   Musculoskeletal: swelling legs  Neurologic:, weakness      Adult Non-Verbal Pain Assessment Score:2    Face  [] 0   No particular expression or smile  [x] 1   Occasional grimace, tearing, frowning, wrinkled forehead  [] 2   Frequent grimace, tearing, frowning, wrinkled forehead    Activity (movement)  [] 0   Lying quietly, normal position  [x] 1   Seeking attention through movement or slow, cautious movement  [] 2   Restless, excessive activity and/or withdrawal reflexes    Guarding  [x] 0   Lying quietly, no positioning of hands over areas of body  [] 1   Splinting areas of the body, tense  [] 2   Rigid, stiff    Physiology (vital signs)  [x] 0   Stable vital signs  [] 1   Change in any of the following: SBP > 20mm Hg; HR > 20/minute  [] 2   Change in any of the following: SBP > 30mm Hg; HR > 25/minute    Respiratory  [x] 0   Baseline RR/SpO2, compliant with ventilator  [] 1   RR > 10 above baseline, or 5% drop SpO2, mild asynchrony with ventilator  [] 2   RR > 20 above baseline, or 10% drop SpO2, asynchrony with ventilator     FUNCTIONAL ASSESSMENT     Palliative Performance Scale (PPS): 10       PSYCHOSOCIAL/SPIRITUAL ASSESSMENT     Active Problems:    Sepsis ([facility] (9/1/2019)      Past Medical History:   Diagnosis Date    CHF (congestive heart failure) ([facility] 9/1/2019    Esophageal cancer ([facility] 9/1/2019    H/O tracheostomy 9/1/2019    History of completed stroke 9/1/2019      No past surgical history on file.    Social History     Tobacco Use    Smoking status: Unknown If Ever Smoked   Substance Use Topics    Alcohol use: Not Currently     Family History   Family history unknown: Yes      No Known Allergies   Current Facility-Administered Medications   Medication Dose Route Frequency    white petrolatum-mineral oil (LACRILUBE S.O.P.) ointment   Both Eyes Q12H    LORazepam (ATIVAN) injection 1 mg  1 mg IntraVENous Q15MIN PRN    ondansetron (ZOFRAN) injection 4 mg  4 mg IntraVENous Q4H PRN    scopolamine (TRANSDERM-SCOP) 1 mg over 3 days 1 Patch  1 Patch TransDERmal Q72H    glycopyrrolate (ROBINUL) injection 0.2 mg  0.2 mg IntraVENous Q4H    bisacodyl (DULCOLAX) suppository 10 mg  10 mg Rectal DAILY PRN    morphine injection 2 mg  2 mg IntraVENous Q15MIN PRN    morphine injection 2 mg  2 mg IntraVENous Q2H        PHYSICAL EXAM     Wt Readings from Last 3 Encounters:   09/01/19 145 lb (65.8 kg)   07/30/19 145 lb (65.8 kg)   04/03/19 145 lb (65.8 kg)       Visit Vitals  /78 (BP 1 Location: Left arm, BP Patient Position: At rest)   Pulse 89   Temp 97.4 °F (36.3 °C)   Resp 16   SpO2 97%       Supplemental O2  [x] Yes  [] NO  Last bowel movement:     Currently this patient has:  [x] Peripheral IV [] PICC  [] PORT [] ICD    [x] Cummings Catheter [] NG Tube   [x] PEG Tube    [] Rectal Tube [] Drain  [] Other:     Constitutional: Ill-appearing, ashen, nonverbal  Eyes: Eyes are open but sunken  ENMT: Trach in place.   No further oxygen required  Cardiovascular: Regular rate and rhythm  Respiratory: Breathing has slowed, minimal secretions  Gastrointestinal: Soft, PEG in place  Musculoskeletal: Muscle wasting, emaciated  Skin: Poor skin turgor  Neurologic: Nonverbal  Psychiatric: Calm  Other:       Pertinent Lab and or Imaging Tests:  Lab Results   Component Value Date/Time    Sodium 139 09/01/2019 05:19 PM    Potassium 5.3 (H) 09/01/2019 05:19 PM    Chloride 101 09/01/2019 05:19 PM    CO2 28 09/01/2019 05:19 PM    Anion gap 10 09/01/2019 05:19 PM    Glucose 122 (H) 09/01/2019 05:19 PM    BUN 53 (H) 09/01/2019 05:19 PM    Creatinine 2.12 (H) 09/01/2019 05:19 PM    BUN/Creatinine ratio 25 (H) 09/01/2019 05:19 PM    GFR est AA 37 (L) 09/01/2019 05:19 PM    GFR est non-AA 31 (L) 09/01/2019 05:19 PM    Calcium 8.6 09/01/2019 05:19 PM     Lab Results   Component Value Date/Time    Protein, total 7.4 09/01/2019 05:19 PM    Albumin 2.2 (L) 09/01/2019 05:19 PM           Total time:   Counseling / coordination time:   > 50% counseling / coordination?:

## 2019-09-05 NOTE — PROGRESS NOTES
Bedside and Verbal shift change report given to [facility] rn  (oncoming nurse) by Hipolito Ortega  (offgoing nurse). Report included the following information SBAR and Kardex.

## 2019-09-05 NOTE — PROGRESS NOTES
Bedside shift change report given to Warren Laura (oncoming nurse) by Damian Mcmanus RN (offgoing nurse). Report included the following information SBAR, Kardex and MAR.

## 2019-09-05 NOTE — HOSPICE
0345:  This nurse and Mars Galvan RN, performed wound care on pt. Three wounds noted: stage 2 on sacral/coccyx/buttocks (foam dressing), stage 1 on testicles (barrier cream), and stage 1 on upper/medial/posterior right shoulder (barrier cream applied). Pericare was performed to remove moderate amount of fecal matter. Turned/repositioned to left side (pt was on right side), heels floated, back care provided. New bed pad placed under pt. Drainage from eyes were cleaned with warm wash cloth and clean pillow case provided.

## 2019-09-05 NOTE — PROGRESS NOTES
This was a follow-up visit to affirm for patient that he is not alone as his family is unable to visit. Assured him of continuing prayer support.

## 2019-09-05 NOTE — HOSPICE
[facility] in [facility]  [phone]    GIP Daily Nursing Note   Patient Name: Pedro Enriquez  YOB: 1945  Age: 69 y.o. Date of Visit: 09/05/19  Facility of [facility]  Patient Room: [facility]     Hospice Attending: Johnathan Arriaga MD  Hospice Diagnosis: Sepsis [facility]) [A41.9]    Level of Care: GIP    Current GIP Symptoms    1. Pain  2. Excessive secretions  3. Restlessness         ASSESSMENT & PLAN   Must update Plan of Care including visit frequencies for IDT members  1. Continue scheduled IV medications for management of secretions, shortness of breath and pain  2. Remain hospice under GIP LOC      Spiritual Interventions: hospice chaplain visits as indicated    Psych/ Social/ Emotional Interventions: hospice MSW visits as indicated    Care Coordination Needs: continue to coordinate care with hospice team    Care plan and New Orders discussed / approved with Adwoa Chin MD.    Description History and Chart Review   If this is initial GIP note must document RN assessment/MD communication in previous setting. Specifically document nursing/medication needs in last 24 hours to support GIP care  Narrative History of last 24 hours that demonstrates care cannot be provided in another setting:  Receiving IV medications that cannot be administered outside hospital setting    What has been done to control the patient's symptoms in the last 24 hours? Continue IV medications for secretions, dyspnea and pain    Does the patient currently require IV medications? yes  Does the patient currently require scheduled medications?  yes  Does the patient currently require a PCA? no        Supporting documentation for GIP need for pain control:  [x] Frequent evaluation by a doctor, nurse practitioner, nurse   [] Frequent medication adjustment    [x] IVs that cannot be administered at home   [] Aggressive pain management   [] Complicated technical delivery of medications Supporting documentation for GIP need for symptom control:  []  Sudden decline necessitating intensive nursing intervention  []  Uncontrolled / intractable nausea or vomiting   []  Pathological fractures  []  Advanced open wounds requiring frequent skilled care  [] Unmanageable respiratory distress  [] New or worsening delirium   [] Delirium with behavior issues: Is 24 hour caregiver present due to safety concerns with agitation? (yes/no)  [x] Imminent death  with skilled nursing needs documented above    DISCHARGE PLANNING   Daily discharge planning required for GIP  1. Discharge Plan: if pt stabilizes, will transition back to correctional facility  2. Patient/Family teaching: none at bedside  3. Response to patient/family teaching: none at bedside    ASSESSMENT    KARNOFSKY: 10    Prognosis estimated based on 09/05/19 clinical assessment is:   [x] Few to Many Hours  [] Hours to Days   [] Few to Many Days   [] Days to Weeks   [] Few to Many Weeks   [] Weeks to Months   [] Few to Many Months    Quality Measure: Patient self-reports:  [] Yes    [x] No    ESAS:   Time of Assessment: 1500  Pain (1-10):6  Fatigue (1-10): 10  Shortness of breath (1-10):6  Nausea (1-10): Appetite (1-10):    Anxiety: (1-10):   Depression: (1-10):   Well-being: (1-10):   Constipation: _ Yes  _ No  LAST BM: 9/4    CLINICAL INFORMATION     Patient Vitals for the past 12 hrs:   Temp Pulse Resp BP SpO2   09/05/19 0834 97.4 °F (36.3 °C) 89 16  97 %       Currently this patient has:  [] Supplemental O2   [x] IV    [] PICC      [] PORT   [] NG Tube    [] PEG Tube   [] Ostomy     [x] Cummings draining _dark yellow______ urine  [] Other:     SIGNS/PHYSICAL FINDINGS     Skin (including wound):  [] Warm, dry, supple, intact and color normal for race  [] Warm   [x] Dry   [x] Cool     [] Clammy       [] Diaphoretic    Turgor   [] Normal   [x] Decreased  Color:   [] Pink   [x] Pale   [] Cyanotic   [] Erythema   [] Jaundice   [] Normal for Race  []  Wounds:    Neuro:  [x] Lethargy  [x] Restlessness / agitation  [] Confusion / delirium  [] Hallucinations  [] Responds to maximal stimulation  [] Unresponsive  [] Seizures     Cardiac:  [] Dyspnea on Exertion  [] JVD  [] Murmur  [] Palpitations  [] Hypotension  [] Hypertension  [] Tachycardia  [] Bradycardia  [x] Irregular HR  [x] Pulses Decreased  [] Pulses Absent  [x] Edema:      Right arm, ankle and foot. 2+  [] Mottling:      (Location)    Respiratory:  Breath sounds:    [x] Diminished   [] Wheeze   [] Rhonchi   [] Rales   [] Even and unlabored  [x] Labored:  shallow       [] Cough   [] Non Productive   [] Productive    [] Description:           [] Deep suctioned   [] O2 at ___ LPM  [] High flow oxygen greater than 10 LPM  [] Bi-Pap    GI  [x] Abdomen (describe)   [] Ascites  [] Nausea  [] Vomiting  [] Incontinent of bowels  [x] Bowel sounds (no)  [] Diarrhea  [] Constipation (see above including last bowel movement)  [] Checked for impaction  [x] Last BM 9/4    Nutrition  Diet:_NPO_________  Appetite:   [] Good   [] Fair   [] Poor   [] Tube Feeding       [] Voiding  [] Incontinent   [x] Cummings    Musculoskeletal  [] Balance/Hendrix Unsteady   [x] Weak   Strength:    [] Normal    [] Limited    [x] Decreasing   Activities:    [] Up as tolerated   [x] Bedridden    [] Specify:    SAFETY  [] 24 hr. Caregiver   [x] Side rails ?     [x] Hospital bed   [] Reviewed Falls & Safety       ALLERGIES AND MEDICATIONS     Allergies: No Known Allergies    Current Facility-Administered Medications   Medication Dose Route Frequency    white petrolatum-mineral oil (LACRILUBE S.O.P.) ointment   Both Eyes Q12H    LORazepam (ATIVAN) injection 1 mg  1 mg IntraVENous Q15MIN PRN    ondansetron (ZOFRAN) injection 4 mg  4 mg IntraVENous Q4H PRN    scopolamine (TRANSDERM-SCOP) 1 mg over 3 days 1 Patch  1 Patch TransDERmal Q72H    glycopyrrolate (ROBINUL) injection 0.2 mg  0.2 mg IntraVENous Q4H    bisacodyl (DULCOLAX) suppository 10 mg  10 mg Rectal DAILY PRN    morphine injection 2 mg  2 mg IntraVENous Q15MIN PRN    morphine injection 2 mg  2 mg IntraVENous Q2H

## 2019-09-06 NOTE — PROGRESS NOTES
Follow up with Mr. Wendy Mercado in 5 Med Surg, he is on hospice. Officers were in the room. Provided supportive presence with Mr. Wendy Mercado. Though he did not directly respond he would rais an eyebrow or move his mouth as I spoke with him. It is difficult to know if he understood the visit. His jeny is unknown so provided word of support for what ever it is that brings him strength. Chaplains will follow as able and/or needed.   Visited by: Evert Yeager, MS., [id number] ([id number])

## 2019-09-06 NOTE — ROUTINE PROCESS
Bedside shift change report given to Yanely Arreola RN (oncoming nurse) by Candelaria Nam RN (offgoing nurse). Report included the following information SBAR, Kardex and MAR.

## 2019-09-06 NOTE — PROGRESS NOTES
[facility]  [phone]    Patient Name: Pato Lester  YOB: 1945    Date of Provider Hospice Visit: 09/06/19    Level of Care:   [x] General Inpatient (GIP)    [] Routine   [] Respite    Current Location of Care:  [] [facility] [x] [facility] [] [facility] [] [facility] [] [facility], patient referred from:  [] [facility] [] [facility] [] [facility] [] [facility] [] Home [] Other:     Date of Original Hospice Admission: 9/2/2019  Hospice Medical Director at time of admission: [place]illa Diagnosis: Sepsis  Diagnoses RELATED to the terminal prognosis: Esophageal cancer, massive stroke, nonverbal, trach and PEG, severe protein malnutrition  Other Diagnoses:      HOSPICE SUMMARY     Pato Lester is a 69y.o. year old who was admitted to [facility]. Patient is a 70-year-old gentleman who has a [place] correctional inmate. Patient with a history of extensive stroke that has left him nonverbal and he also has required a PEG and a trach. He has a history of esophageal cancer which  unfortunately do not know a lot of the significant details. He presents to the emergency room with sepsis, source unknown. Patient hypotensive but with significant issues with secretions and shortness of breath. After discussion with his daughter via phone, agreement and transition to inpatient hospice care to focus on his comfort. Patient an inmate in the correctional facility. We had to obtain consents verbally via phone secondary to his daughter living in [place] and unable to travel to the hospital.  In addition, we would need permission from the correctional facility to allow visitation to the hospital.  After discussion with his daughter, she was not going to be able to travel to [place] secondary to her work schedule. She states she had seen him earlier this year and was okay with the focus to be comfort.     The patient's principle diagnosis has resulted in minimally responsive  Refer to LCD     Functionally, the patient's Karnofsky and/or Palliative Performance Scale has declined over a period of days to weeks and is estimated at 10. The patient is dependent on the following ADLs: All    Objective information that support this patients limited prognosis includes:  Fever to 105  Albumin of 2.2   chest x-ray with diffuse patchy infiltrates edema versus infection-      The patient/family chose comfort measures with the support of Hospice. HOSPICE DIAGNOSES   Active Symptoms:  1. Shortness of breath  2. Secretions  3. Restlessness  4. Blepharitis     PLAN      1.  and SW to support family needs  2. Patient definitely with further decline today. Very much less interactive. Feels warm to the touch. Will continue [facility] level care to support his symptom management. In addition, will need ongoing nursing assessment that could not be done in the longterm setting. 3. Continue morphine 2 mg IV every 2 hours scheduled and every 15 minutes as needed for pain and shortness of breath. No PRN doses needed  4. Scopolamine patch and Robinul 0.2 mill grams IV every 4 hours scheduled for secretions  5. We will add eyedrops for comfort secondary to his blepharitis. 6. Add back Toradol for PRN fever. 7. Comfort order set placed for all other symptoms to include fever, nausea, agitation  8. Plan reviewed with bedside nurse, Brandan-hospice nurse  9. Disposition: Likely will die in the hospital    Prognosis estimated based on 09/06/19 clinical assessment is:   [x] Hours to Days    [] Days to Weeks    [] Other:    Communicated plan of care with: Hospice Case Manager; Hospice IDT; Care Team     GOALS OF CARE     Patient/Medical POA stated Goal of Care: Hospice care    [x] I have reviewed and/or updated ACP information in the Advance Care Planning Navigator. This information is available in the [address] link in the patient's chart header.     Primary Decision [facility] Agent):     Resuscitation Status: DNR  If DNR is there a Durable DNR on file? : [x] Yes [] No (If no, complete Durable DNR)    HISTORY     History obtained from: Chart, daughter, bedside nurse    CHIEF COMPLAINT: Fever  The patient is:   [] Verbal  [x] Nonverbal  [] Unresponsive    HPI/SUBJECTIVE: Patient is nonverbal.  His eyes are open but he appears to be staring past us.    9/3minimally responsive. Eyes are still open at times but do not appear to be tracking    9/4-patient minimally responsive. Secretions are less    9/5 patient's eyes remain open. Does appear to attempt to grab my hand with his right hand. Not tracking    9/6patient much less interactive. Feels warm to the touch. Minimal air movement.        REVIEW OF SYSTEMS     The following systems were: [x] reviewed  [] unable to be reviewed    Positive ROS include:  Constitutional: fatigue, weakness, short of breath  Ears/nose/mouth/throat: increased airway secretions  Respiratory:shortness of breath,   Gastrointestinal:poor appetite,   Musculoskeletal: swelling legs  Neurologic:, weakness      Adult Non-Verbal Pain Assessment Score:2    Face  [] 0   No particular expression or smile  [x] 1   Occasional grimace, tearing, frowning, wrinkled forehead  [] 2   Frequent grimace, tearing, frowning, wrinkled forehead    Activity (movement)  [] 0   Lying quietly, normal position  [x] 1   Seeking attention through movement or slow, cautious movement  [] 2   Restless, excessive activity and/or withdrawal reflexes    Guarding  [x] 0   Lying quietly, no positioning of hands over areas of body  [] 1   Splinting areas of the body, tense  [] 2   Rigid, stiff    Physiology (vital signs)  [x] 0   Stable vital signs  [] 1   Change in any of the following: SBP > 20mm Hg; HR > 20/minute  [] 2   Change in any of the following: SBP > 30mm Hg; HR > 25/minute    Respiratory  [x] 0   Baseline RR/SpO2, compliant with ventilator  [] 1   RR > 10 above baseline, or 5% drop SpO2, mild asynchrony with ventilator  [] 2   RR > 20 above baseline, or 10% drop SpO2, asynchrony with ventilator     FUNCTIONAL ASSESSMENT     Palliative Performance Scale (PPS): 10       PSYCHOSOCIAL/SPIRITUAL ASSESSMENT     Active Problems:    Sepsis ([facility] (9/1/2019)      Past Medical History:   Diagnosis Date    CHF (congestive heart failure) ([facility] 9/1/2019    Esophageal cancer ([facility] 9/1/2019    H/O tracheostomy 9/1/2019    History of completed stroke 9/1/2019      No past surgical history on file.    Social History     Tobacco Use    Smoking status: Unknown If Ever Smoked   Substance Use Topics    Alcohol use: Not Currently     Family History   Family history unknown: Yes      No Known Allergies   Current Facility-Administered Medications   Medication Dose Route Frequency    ketorolac (TORADOL) injection 15 mg  15 mg IntraVENous Q6H PRN    acetaminophen (TYLENOL) suppository 650 mg  650 mg Rectal Q4H PRN    white petrolatum-mineral oil (LACRILUBE S.O.P.) ointment   Both Eyes Q12H    LORazepam (ATIVAN) injection 1 mg  1 mg IntraVENous Q15MIN PRN    ondansetron (ZOFRAN) injection 4 mg  4 mg IntraVENous Q4H PRN    scopolamine (TRANSDERM-SCOP) 1 mg over 3 days 1 Patch  1 Patch TransDERmal Q72H    glycopyrrolate (ROBINUL) injection 0.2 mg  0.2 mg IntraVENous Q4H    bisacodyl (DULCOLAX) suppository 10 mg  10 mg Rectal DAILY PRN    morphine injection 2 mg  2 mg IntraVENous Q15MIN PRN    morphine injection 2 mg  2 mg IntraVENous Q2H        PHYSICAL EXAM     Wt Readings from Last 3 Encounters:   09/01/19 145 lb (65.8 kg)   07/30/19 145 lb (65.8 kg)   04/03/19 145 lb (65.8 kg)       Visit Vitals  BP (!) 76/60   Pulse (!) 54   Temp 97.1 °F (36.2 °C)   Resp 16   SpO2 (!) 86%       Supplemental O2  [x] Yes  [] NO  Last bowel movement:     Currently this patient has:  [x] Peripheral IV [] PICC  [] PORT [] ICD    [x] Cummings Catheter [] NG Tube   [x] PEG Tube    [] Rectal Tube [] Drain  [] Other: Constitutional: Ill-appearing, ashen, nonverbal  Eyes: Eyes are open but sunken  ENMT: Trach in place.   No further oxygen required  Cardiovascular: Regular rate and rhythm  Respiratory: Breathing has slowed, minimal secretions, minimal air movement  Gastrointestinal: Soft, PEG in place  Musculoskeletal: Muscle wasting, emaciated  Skin: Poor skin turgor  Neurologic: Nonverbal  Psychiatric: Calm  Other:       Pertinent Lab and or Imaging Tests:  Lab Results   Component Value Date/Time    Sodium 139 09/01/2019 05:19 PM    Potassium 5.3 (H) 09/01/2019 05:19 PM    Chloride 101 09/01/2019 05:19 PM    CO2 28 09/01/2019 05:19 PM    Anion gap 10 09/01/2019 05:19 PM    Glucose 122 (H) 09/01/2019 05:19 PM    BUN 53 (H) 09/01/2019 05:19 PM    Creatinine 2.12 (H) 09/01/2019 05:19 PM    BUN/Creatinine ratio 25 (H) 09/01/2019 05:19 PM    GFR est AA 37 (L) 09/01/2019 05:19 PM    GFR est non-AA 31 (L) 09/01/2019 05:19 PM    Calcium 8.6 09/01/2019 05:19 PM     Lab Results   Component Value Date/Time    Protein, total 7.4 09/01/2019 05:19 PM    Albumin 2.2 (L) 09/01/2019 05:19 PM           Total time:   Counseling / coordination time:   > 50% counseling / coordination?:

## 2019-09-06 NOTE — PROGRESS NOTES
Bedside and Verbal shift change report given to Tariq Pryor and Steph Devlin (oncoming nurse) by Debra Barriga (offgoing nurse). Report included the following information SBAR, Kardex, Intake/Output, MAR and Accordion.

## 2019-09-06 NOTE — HOSPICE
[facility]  [phone]    GIP Daily Nursing Note   Patient Name: Clarisa Correia  YOB: 1945  Age: 69 y.o. Date of Visit: 09/06/19  Facility of Care: [facility]  Patient Room: 503/01     Hospice Attending: Ani Jones MD  Hospice Diagnosis: Sepsis [facility]) [A41.9]    Level of Care: GIP    Current GIP Symptoms    1. Severe Pain  2. Excessive secretions  3. Terminal agitation      ASSESSMENT & PLAN   Must update Plan of Care including visit frequencies for IDT members  1. Continue with scheduled morphine 2 mg IVP q2hrs and morphine 2 mg IVP [id number] prn for severe pain  2. Continue with scheduled glycopyrollate 0.2 mg IVP q4hrs for severe upper airway excessive secretions  3. Continue with ativan 1 mg [id number] prn for terminal agitation  4. Add toradol 15 mg IVP q6hrs for fever; add acetaminophen 650 mg q4hrs ID prn for fever  5. Hospice IDT to continue to coordinate care    Spiritual Interventions: hospice chaplain and hospital-based  continue to offer spiritual support    Psych/ Social/ Emotional Interventions: hospice MSW to continue to provide support; Dr Rosemarie Gross to reach out to patient's daughter via phone today    Care Coordination Needs: hospice RN, hospice MSW, hospice MD, hospice chaplain, and unit nursing staff continue to coordinate care    Care plan and New Orders discussed / approved with Rosemarie Gross MD.    Description History and Chart Review   If this is initial GIP note must document RN assessment/MD communication in previous setting.  Specifically document nursing/medication needs in last 24 hours to support GIP care  Narrative History of last 24 hours that demonstrates care cannot be provided in another setting:  Patient requiring scheduled morphine q2hrs IVP; patient requiring scheduled anticholingeric IV q4hrs; patient requiring extensive wound care; we have added toradol and acetaminophen prn for fever; patient continues with periodic grimacing during assessment    What has been done to control the patient's symptoms in the last 24 hours? Patient requiring scheduled morphine q2hrs IVP; patient requiring scheduled anticholingeric IV q4hrs; patient requiring extensive wound care; we have added toradol and acetaminophen prn for fever    Does the patient currently require IV medications? Yes  Does the patient currently require scheduled medications? Yes  Does the patient currently require a PCA? No    List number of doses of PRN medications in last 24 hours:  Medication 1: morphine 2 mg IVP [id number]  Number of doses: 0    Medication 2:   Number of doses:    Medication 3:   Number of doses:    Supporting documentation for GIP need for pain control:  [x] Frequent evaluation by a doctor, nurse practitioner, nurse   [x] Frequent medication adjustment    [x] IVs that cannot be administered at home   [x] Aggressive pain management   [x] Complicated technical delivery of medications              Supporting documentation for GIP need for symptom control:  []  Sudden decline necessitating intensive nursing intervention  []  Uncontrolled / intractable nausea or vomiting   []  Pathological fractures  []  Advanced open wounds requiring frequent skilled care  [x] Unmanageable respiratory distress  [] New or worsening delirium   [] Delirium with behavior issues: Is 24 hour caregiver present due to safety concerns with agitation? (yes/no)  [x] Imminent death  with skilled nursing needs documented above    DISCHARGE PLANNING   Daily discharge planning required for GIP  1. Discharge Plan: transfer back to [facility] facility when condition stabilizes and symptoms become manageable; unfortunately patient's death appears imminent and he is not stable for transport back to facility  2. Patient/Family teaching: discussed plan of care with patient this morning; patient nonverbal at this time  3.  Response to patient/family teaching: patient unable to verbalized understanding of teaching     ASSESSMENT    KARNOFSKY: 10%    Prognosis estimated based on 09/06/19 clinical assessment is:   [x] Few to Many Hours  [] Hours to Days   [] Few to Many Days   [] Days to Weeks   [] Few to Many Weeks   [] Weeks to Months   [] Few to Many Months    Quality Measure: Patient self-reports:  [] Yes    [x] No    ESAS:   Time of Assessment: 12:30  Pain (1-10):5  Fatigue (1-10): 2  Shortness of breath (1-10):2  Nausea (1-10): Appetite (1-10):    Anxiety: (1-10):   Depression: (1-10):   Well-being: (1-10):   Constipation: _ Yes  _ No  LAST BM: 9/8    CLINICAL INFORMATION     Patient Vitals for the past 12 hrs:   Temp Pulse BP SpO2   09/06/19 0843 97.1 °F (36.2 °C) (!) 54 (!) 76/60 (!) 86 %       Currently this patient has:  [] Supplemental O2   [x] IV    [] PICC      [] PORT   [] NG Tube    [x] PEG Tube   [] Ostomy     [x] Cummings draining minimal very concentrated urine  [x] Other: trach on room air    SIGNS/PHYSICAL FINDINGS     Skin (including wound):  [] Warm, dry, supple, intact and color normal for race  [x] Warm   [] Dry   [] Cool     [] Clammy       [] Diaphoretic    Turgor   [] Normal   [x] Decreased  Color:   [] Pink   [] Pale   [] Cyanotic   [] Erythema   [] Jaundice   [] Normal for Race  [x]  Wounds: 3 wounds to posterior coccyx with dressing change completed yesterday    Neuro:  [x] Lethargy  [] Restlessness / agitation  [] Confusion / delirium  [] Hallucinations  [x] Responds to maximal stimulation  [] Unresponsive  [] Seizures     Cardiac:  [x] Dyspnea on Exertion  [] JVD  [] Murmur  [] Palpitations  [x] Hypotension  [] Hypertension  [] Tachycardia  [x] Bradycardia  [] Irregular HR  [] Pulses Decreased  [] Pulses Absent  [x] Edema:  BLE +2  [] Mottling:      (Location)    Respiratory:  Breath sounds: Bradypnea with limited air movement   [x] Diminished   [] Wheeze   [] Rhonchi   [] Rales   [] Even and unlabored  [] Labored:            [] Cough   [] Non Productive   [] Productive    [] Description: [] Deep suctioned   [] O2 at ___ LPM  [] High flow oxygen greater than 10 LPM  [] Bi-Pap    GI  [x] Abdomen (soft, non-tender, non-distended)   [] Ascites  [] Nausea  [] Vomiting  [x] Incontinent of bowels  [x] Bowel sounds (Hypoactive)  [] Diarrhea  [] Constipation (see above including last bowel movement)  [] Checked for impaction  [x] Last BM 9/5    Nutrition  Diet: NPO  Appetite:   [] Good   [] Fair   [x] Poor   [] Tube Feeding       [] Voiding  [] Incontinent   [x] Cummings    Musculoskeletal  [] Balance/[place] Unsteady   [x] Weak   Strength:    [] Normal    [] Limited    [x] Decreasing   Activities:    [] Up as tolerated   [x] Bedridden    [] Specify:    SAFETY  [x] 24 hr. Caregiver   [x] Side rails ?     [x] Hospital bed   [x] Reviewed Falls & Safety       ALLERGIES AND MEDICATIONS     Allergies: No Known Allergies    Current Facility-Administered Medications   Medication Dose Route Frequency    white petrolatum-mineral oil (LACRILUBE S.O.P.) ointment   Both Eyes Q12H    LORazepam (ATIVAN) injection 1 mg  1 mg IntraVENous Q15MIN PRN    ondansetron (ZOFRAN) injection 4 mg  4 mg IntraVENous Q4H PRN    scopolamine (TRANSDERM-SCOP) 1 mg over 3 days 1 Patch  1 Patch TransDERmal Q72H    glycopyrrolate (ROBINUL) injection 0.2 mg  0.2 mg IntraVENous Q4H    bisacodyl (DULCOLAX) suppository 10 mg  10 mg Rectal DAILY PRN    morphine injection 2 mg  2 mg IntraVENous Q15MIN PRN    morphine injection 2 mg  2 mg IntraVENous Q2H          Visit Time In: 12:00  Visit Time Out: 13:00

## 2019-09-06 NOTE — PROGRESS NOTES
[place]  [phone]    Patient Name: Clarisa Correia  YOB: 1945    Date of Provider Hospice Visit: 09/06/19    Level of Care:   [x] General Inpatient (GIP)    [] Routine   [] Respite    Current Location of Care:  [] [facility] [x] [facility] [] [address] [] [facility] [] Hospice [facility], patient referred from:  [] [facility] [] [facility] [] [address] [] [facility] [] Home [] Other:     Date of Original Hospice Admission: 9/2/2019  Hospice Medical Director at time of admission: Ganos Diagnosis: Sepsis  Diagnoses RELATED to the terminal prognosis: Esophageal cancer, massive stroke, nonverbal, trach and PEG, severe protein malnutrition  Other Diagnoses:      HOSPICE SUMMARY     Clarisa Correia is a 69y.o. year old who was admitted to [facility]. Patient is a 22-year-old gentleman who has a [place] correctional inmate. Patient with a history of extensive stroke that has left him nonverbal and he also has required a PEG and a trach. He has a history of esophageal cancer which  unfortunately do not know a lot of the significant details. He presents to the emergency room with sepsis, source unknown. Patient hypotensive but with significant issues with secretions and shortness of breath. After discussion with his daughter via phone, agreement and transition to inpatient hospice care to focus on his comfort. Patient an inmate in the correctional facility. We had to obtain consents verbally via phone secondary to his daughter living in [facility] and unable to travel to the hospital.  In addition, we would need permission from the correctional facility to allow visitation to the hospital.  After discussion with his daughter, she was not going to be able to travel to [place] secondary to her work schedule. She states she had seen him earlier this year and was okay with the focus to be comfort.     The patient's principle diagnosis has resulted in minimally responsive  Refer to LCD     Functionally, the patient's Karnofsky and/or Palliative Performance Scale has declined over a period of days to weeks and is estimated at 10. The patient is dependent on the following ADLs: All    Objective information that support this patients limited prognosis includes:  Fever to 105  Albumin of 2.2   chest x-ray with diffuse patchy infiltrates edema versus infection-      The patient/family chose comfort measures with the support of Hospice. HOSPICE DIAGNOSES   Active Symptoms:  1. Shortness of breath  2. Secretions  3. Restlessness  4. Blepharitis     PLAN      1.  and SW to support family needs  2. Patient definitely with further decline today. Very much less interactive. Feels warm to the touch. Will continue [facility] level care to support his symptom management. In addition, will need ongoing nursing assessment that could not be done in the [facility] setting. 3. Continue morphine 2 mg IV every 2 hours scheduled and every 15 minutes as needed for pain and shortness of breath. No PRN doses needed  4. Scopolamine patch and Robinul 0.2 mill grams IV every 4 hours scheduled for secretions  5. We will add eyedrops for comfort secondary to his blepharitis. 6. Add back Toradol for PRN fever. 7. Comfort order set placed for all other symptoms to include fever, nausea, agitation  8. Did talk with patient daughter to update her-phone number [phone]  9. Plan reviewed with bedside nurse, Brandan-hospice nurse  10. Disposition: Likely will die in the hospital    Prognosis estimated based on 09/06/19 clinical assessment is:   [x] Hours to Days    [] Days to Weeks    [] Other:    Communicated plan of care with: Hospice Case Manager; Hospice IDT; Care Team     GOALS OF CARE     Patient/Medical POA stated Goal of Care: Hospice care    [x] I have reviewed and/or updated ACP information in the Advance Care Planning Navigator.  This information is available in the [facility] link in the patient's chart header. Primary Decision Maker (Health Care Agent):     Resuscitation Status: DNR  If DNR is there a Durable DNR on file? : [x] Yes [] No (If no, complete Durable DNR)    HISTORY     History obtained from: Chart, daughter, bedside nurse    CHIEF COMPLAINT: Fever  The patient is:   [] Verbal  [x] Nonverbal  [] Unresponsive    HPI/SUBJECTIVE: Patient is nonverbal.  His eyes are open but he appears to be staring past us.    9/3minimally responsive. Eyes are still open at times but do not appear to be tracking    9/4-patient minimally responsive. Secretions are less    9/5 patient's eyes remain open. Does appear to attempt to grab my hand with his right hand. Not tracking    9/6patient much less interactive. Feels warm to the touch. Minimal air movement.        REVIEW OF SYSTEMS     The following systems were: [x] reviewed  [] unable to be reviewed    Positive ROS include:  Constitutional: fatigue, weakness, short of breath  Ears/nose/mouth/throat: increased airway secretions  Respiratory:shortness of breath,   Gastrointestinal:poor appetite,   Musculoskeletal: swelling legs  Neurologic:, weakness      Adult Non-Verbal Pain Assessment Score:2    Face  [] 0   No particular expression or smile  [x] 1   Occasional grimace, tearing, frowning, wrinkled forehead  [] 2   Frequent grimace, tearing, frowning, wrinkled forehead    Activity (movement)  [] 0   Lying quietly, normal position  [x] 1   Seeking attention through movement or slow, cautious movement  [] 2   Restless, excessive activity and/or withdrawal reflexes    Guarding  [x] 0   Lying quietly, no positioning of hands over areas of body  [] 1   Splinting areas of the body, tense  [] 2   Rigid, stiff    Physiology (vital signs)  [x] 0   Stable vital signs  [] 1   Change in any of the following: SBP > 20mm Hg; HR > 20/minute  [] 2   Change in any of the following: SBP > 30mm Hg; HR > 25/minute    Respiratory  [x] 0   Baseline RR/SpO2, compliant with ventilator  [] 1   RR > 10 above baseline, or 5% drop SpO2, mild asynchrony with ventilator  [] 2   RR > 20 above baseline, or 10% drop SpO2, asynchrony with ventilator     FUNCTIONAL ASSESSMENT     Palliative Performance Scale (PPS): 10       PSYCHOSOCIAL/SPIRITUAL ASSESSMENT     Active Problems:    Sepsis ([facility] (9/1/2019)      Past Medical History:   Diagnosis Date    CHF (congestive heart failure) ([facility] 9/1/2019    Esophageal cancer ([facility] 9/1/2019    H/O tracheostomy 9/1/2019    History of completed stroke 9/1/2019      No past surgical history on file.    Social History     Tobacco Use    Smoking status: Unknown If Ever Smoked   Substance Use Topics    Alcohol use: Not Currently     Family History   Family history unknown: Yes      No Known Allergies   Current Facility-Administered Medications   Medication Dose Route Frequency    ketorolac (TORADOL) injection 15 mg  15 mg IntraVENous Q6H PRN    acetaminophen (TYLENOL) suppository 650 mg  650 mg Rectal Q4H PRN    white petrolatum-mineral oil (LACRILUBE S.O.P.) ointment   Both Eyes Q12H    LORazepam (ATIVAN) injection 1 mg  1 mg IntraVENous Q15MIN PRN    ondansetron (ZOFRAN) injection 4 mg  4 mg IntraVENous Q4H PRN    scopolamine (TRANSDERM-SCOP) 1 mg over 3 days 1 Patch  1 Patch TransDERmal Q72H    glycopyrrolate (ROBINUL) injection 0.2 mg  0.2 mg IntraVENous Q4H    bisacodyl (DULCOLAX) suppository 10 mg  10 mg Rectal DAILY PRN    morphine injection 2 mg  2 mg IntraVENous Q15MIN PRN    morphine injection 2 mg  2 mg IntraVENous Q2H        PHYSICAL EXAM     Wt Readings from Last 3 Encounters:   09/01/19 65.8 kg (145 lb)   07/30/19 65.8 kg (145 lb)   04/03/19 65.8 kg (145 lb)       Visit Vitals  BP (!) 76/60   Pulse (!) 54   Temp 97.1 °F (36.2 °C)   Resp 16   SpO2 (!) 86%       Supplemental O2  [x] Yes  [] NO  Last bowel movement:     Currently this patient has:  [x] Peripheral IV [] PICC  [] PORT [] ICD    [x] Cummings Catheter [] NG Tube   [x] PEG Tube    [] Rectal Tube [] Drain  [] Other:     Constitutional: Ill-appearing, ashen, nonverbal  Eyes: Eyes are open but sunken  ENMT: Trach in place.   No further oxygen required  Cardiovascular: Regular rate and rhythm  Respiratory: Breathing has slowed, minimal secretions, minimal air movement  Gastrointestinal: Soft, PEG in place  Musculoskeletal: Muscle wasting, emaciated  Skin: Poor skin turgor  Neurologic: Nonverbal  Psychiatric: Calm  Other:       Pertinent Lab and or Imaging Tests:  Lab Results   Component Value Date/Time    Sodium 139 09/01/2019 05:19 PM    Potassium 5.3 (H) 09/01/2019 05:19 PM    Chloride 101 09/01/2019 05:19 PM    CO2 28 09/01/2019 05:19 PM    Anion gap 10 09/01/2019 05:19 PM    Glucose 122 (H) 09/01/2019 05:19 PM    BUN 53 (H) 09/01/2019 05:19 PM    Creatinine 2.12 (H) 09/01/2019 05:19 PM    BUN/Creatinine ratio 25 (H) 09/01/2019 05:19 PM    GFR est AA 37 (L) 09/01/2019 05:19 PM    GFR est non-AA 31 (L) 09/01/2019 05:19 PM    Calcium 8.6 09/01/2019 05:19 PM     Lab Results   Component Value Date/Time    Protein, total 7.4 09/01/2019 05:19 PM    Albumin 2.2 (L) 09/01/2019 05:19 PM           Total time:   Counseling / coordination time:   > 50% counseling / coordination?:

## 2019-09-06 NOTE — HOSPICE
[facility] RN note: This RN received call from Will Palacio [phone], liaison from [facility] for an update on this patient's condition (see previous hospice notes for details of earlier updates). If the patient survives the weekend, she will reach out again on Monday for an update on his clinical condition. Thank you for the opportunity to care for this patient and family. Please contact [facility] [place] at [phone] with any questions or concerns.

## 2019-09-07 NOTE — PROGRESS NOTES
Bedside and Verbal shift change report given to Joey Eng RN (oncoming nurse) by Cely Ferrari RN (offgoing nurse). Report included the following information SBAR, Kardex, Intake/Output, MAR and Recent Results.

## 2019-09-07 NOTE — PROGRESS NOTES
Problem: Falls - Risk of  Goal: *Absence of Falls  Description  Document Tomy Viramontes Fall Risk and appropriate interventions in the flowsheet.   Outcome: Progressing Towards Goal  Note:   Fall Risk Interventions:  Mobility Interventions: Bed/chair exit alarm         Medication Interventions: Bed/chair exit alarm    Elimination Interventions: Bed/chair exit alarm              Problem: Non-Violent Restraints  Goal: *Removal from restraints as soon as assessed to be safe  Outcome: Progressing Towards Goal     Problem: Non-Violent Restraints  Goal: *No harm/injury to patient while restraints in use  Outcome: Progressing Towards Goal     Problem: Non-Violent Restraints  Goal: *Patient's dignity will be maintained  Outcome: Progressing Towards Goal

## 2019-09-07 NOTE — HOSPICE
[facility]  [phone]    GIP Daily Nursing Note   Patient Name: Duglas Giordano  YOB: 1945  Age: 69 y.o. Date of Visit: 09/07/19  Facility of Care: [facility]  Patient Room: [facility]     Hospice Attending: Michael Viramontes MD  Hospice Diagnosis: Sepsis [facility]) [A41.9]    Level of Care: GIP    Current GIP Symptoms    1. Patient minimally responsive with eyes open, left side appears flaccid. 2. Patient with grimace noted in forehead, heart rate bounding, bowel sounds noted, pulses felt  3. Skin feels warm to touch, dry, feet are restrained. Respiratory rate of 20, labored, shallow breaths. ASSESSMENT & PLAN   Must update Plan of Care including visit frequencies for IDT members  1.  1. Increase IV morphine to 3mg every 2 hours scheduled with 4 mg IV morphine every 15 min as needed for severe pain, dyspnea. 2. Robinul IV scheduled every 4 hours for secretion management, scopolamine patch in place behind left ear. 3. Turn and reposition patient every 4 hours, oral care as tolerated. Spiritual Interventions: None at this time. Psych/ Social/ Emotional Interventions: Patient is a prisoner from [employer]. There are two guards present in his room. Spent time speaking with patient, non verbal at this time. Care Coordination Needs: Dr. Carlitos Dickens has rounded on patient today. Care plan and New Orders discussed / approved with Dr. Yohannes Norris MD.    Description History and Chart Review   If this is initial GIP note must document RN assessment/MD communication in previous setting. Specifically document nursing/medication needs in last 24 hours to support GIP care  Narrative History of last 24 hours that demonstrates care cannot be provided in another setting:  Patient actively dying, unsafe for transport, requiring IV medications that require RN monitoring and titration of medications.      What has been done to control the patient's symptoms in the last 24 hours? Increase IV morphine, continue with IV medications, RN monitoring    Does the patient currently require IV medications? yes  Does the patient currently require scheduled medications? yes  Does the patient currently require a PCA? no    List number of doses of PRN medications in last 24 hours:  Medication 1: morphine  Number of doses: 1    Medication 2:   Number of doses:    Medication 3:   Number of doses:    Supporting documentation for GIP need for pain control:  [x] Frequent evaluation by a doctor, nurse practitioner, nurse   [x] Frequent medication adjustment    [x] IVs that cannot be administered at home   [] Aggressive pain management   [] Complicated technical delivery of medications              Supporting documentation for GIP need for symptom control:  [x]  Sudden decline necessitating intensive nursing intervention  []  Uncontrolled / intractable nausea or vomiting   []  Pathological fractures  []  Advanced open wounds requiring frequent skilled care  [] Unmanageable respiratory distress  [] New or worsening delirium   [] Delirium with behavior issues: Is 24 hour caregiver present due to safety concerns with agitation? (yes/no)  [x] Imminent death  with skilled nursing needs documented above    DISCHARGE PLANNING   Daily discharge planning required for GIP  1. Discharge Plan: Patient likely to pass at [facility], unstable for transport, if stabilizes patient [facility]  2. Patient/Family teaching: end of life signs and symptoms  3. Response to patient/family teaching: guards at bedside state no family visiting at this time.      ASSESSMENT    KARNOFSKY:10    Prognosis estimated based on 09/07/19 clinical assessment is:   [x] Few to Many Hours  [] Hours to Days   [] Few to Many Days   [] Days to Weeks   [] Few to Many Weeks   [] Weeks to Months   [] Few to Many Months    Quality Measure: Patient self-reports:  [] Yes    [x] No    ESAS:   Time of Assessment: 1300  Pain (1-10):7  Fatigue (1-10):8  Shortness of breath (1-10):8  Nausea (1-10): 5  Appetite (1-10):    Anxiety: (1-10):   Depression: (1-10):   Well-being: (1-10):   Constipation: x_ Yes  _ No  LAST BM: 9/2/19 last charted BM    CLINICAL INFORMATION     Patient Vitals for the past 12 hrs:   Temp Pulse Resp BP SpO2   09/07/19 0354 97.7 °F (36.5 °C) (!) 58 16 (!) 88/57 (!) 89 %       Currently this patient has:  [] Supplemental O2   [x] IV    [] PICC      [] PORT   [] NG Tube    [] PEG Tube   [] Ostomy     [x] Cummings draining _______ urine  [] Other:     SIGNS/PHYSICAL FINDINGS     Skin (including wound):  [] Warm, dry, supple, intact and color normal for race  [] Warm   [x] Dry   [x] Cool     [] Clammy       [] Diaphoretic    Turgor   [] Normal   [x] Decreased  Color:   [] Pink   [] Pale   [] Cyanotic   [] Erythema   [] Jaundice   [x] Normal for Race  []  Wounds:    Neuro:  [] Lethargy  [] Restlessness / agitation  [] Confusion / delirium  [] Hallucinations  [x] Responds to maximal stimulation  [] Unresponsive  [] Seizures     Cardiac:  [] Dyspnea on Exertion  [] JVD  [] Murmur  [] Palpitations  [x] Hypotension  [] Hypertension  [x] Tachycardia  [] Bradycardia  [] Irregular HR  [x] Pulses Decreased  [] Pulses Absent  [] Edema:       (Location, Grade and Pitting)  [] Mottling:      (Location)    Respiratory:  Breath sounds:    [] Diminished   [] Wheeze   [x] Rhonchi   [] Rales   [] Even and unlabored  [x] Labored:       20     [] Cough   [] Non Productive   [] Productive    [] Description:           [] Deep suctioned   [] O2 at ___ LPM  [] High flow oxygen greater than 10 LPM  [] Bi-Pap    GI  [x] Abdomen (describe) soft, tender  [] Ascites  [] Nausea  [] Vomiting  [x] Incontinent of bowels  [x] Bowel sounds (yes/no)  [] Diarrhea  [] Constipation (see above including last bowel movement)  [] Checked for impaction  [x] Last BM 09/02/19 last charted BM    Nutrition  Diet:__NPO________  Appetite:   [] Good   [] Fair   [] Poor   [] Tube Feeding   [] Voiding  [] Incontinent   [x] Cummings    Musculoskeletal  [] Balance/[place] Unsteady   [x] Weak   Strength:    [] Normal    [] Limited    [x] Decreasing   Activities:    [] Up as tolerated   [x] Bedridden    [] Specify:    SAFETY  [] 24 hr. Caregiver   [x] Side rails ?     [x] Hospital bed   [] Reviewed Falls & Safety       ALLERGIES AND MEDICATIONS     Allergies: No Known Allergies    Current Facility-Administered Medications   Medication Dose Route Frequency    morphine injection 3 mg  3 mg IntraVENous Q2H    morphine injection 4 mg  4 mg IntraVENous Q15MIN PRN    ketorolac (TORADOL) injection 15 mg  15 mg IntraVENous Q6H PRN    acetaminophen (TYLENOL) suppository 650 mg  650 mg Rectal Q4H PRN    white petrolatum-mineral oil (LACRILUBE S.O.P.) ointment   Both Eyes Q12H    LORazepam (ATIVAN) injection 1 mg  1 mg IntraVENous Q15MIN PRN    ondansetron (ZOFRAN) injection 4 mg  4 mg IntraVENous Q4H PRN    scopolamine (TRANSDERM-SCOP) 1 mg over 3 days 1 Patch  1 Patch TransDERmal Q72H    glycopyrrolate (ROBINUL) injection 0.2 mg  0.2 mg IntraVENous Q4H    bisacodyl (DULCOLAX) suppository 10 mg  10 mg Rectal DAILY PRN          Visit Time In: 1300  Visit Time Out: 1330

## 2019-09-07 NOTE — PROGRESS NOTES
[place]  [facility]  [phone]    Patient Name: Stephanie Miller  YOB: 1945    Date of Provider Hospice Visit: 09/07/19    Level of Care:   [x] General Inpatient (GIP)    [] Routine   [] Respite    Current Location of Care:  [] [facility] [x] [facility] [] [address] [] [facility] [] Hospice House THE [facility], patient referred from:  [] [facility] [] [facility] [] [address] [] [facility] [] Home [] Other:     Date of Original Hospice Admission: 9/2/2019  Hospice Medical Director at time of admission: 53[address] [employer] DX Diagnosis: Sepsis  Diagnoses RELATED to the terminal prognosis: Esophageal cancer, massive stroke, nonverbal, trach and PEG, severe protein malnutrition  Other Diagnoses:      HOSPICE SUMMARY     Stephanie Miller is a 69y.o. year old who was admitted to [address] [place] [address]. Patient is a 22-year-old gentleman who has a [facility] correctional inmate. Patient with a history of extensive stroke that has left him nonverbal and he also has required a PEG and a trach. He has a history of esophageal cancer which  unfortunately do not know a lot of the significant details. He presents to the emergency room with sepsis, source unknown. Patient hypotensive but with significant issues with secretions and shortness of breath. After discussion with his daughter via phone, agreement and transition to inpatient hospice care to focus on his comfort. Patient an inmate in the correctional facility. We had to obtain consents verbally via phone secondary to his daughter living in [place] and unable to travel to the hospital.  In addition, we would need permission from the correctional facility to allow visitation to the hospital.  After discussion with his daughter, she was not going to be able to travel to [place] secondary to her work schedule. She states she had seen him earlier this year and was okay with the focus to be comfort.     The patient's principle diagnosis has resulted in minimally responsive  Refer to LCD     Functionally, the patient's Karnofsky and/or Palliative Performance Scale has declined over a period of days to weeks and is estimated at 10. The patient is dependent on the following ADLs: All    Objective information that support this patients limited prognosis includes:  Fever to 105  Albumin of 2.2   chest x-ray with diffuse patchy infiltrates edema versus infection-      The patient/family chose comfort measures with the support of Hospice. HOSPICE DIAGNOSES   Active Symptoms:  1. Shortness of breath  2. Secretions  3. Restlessness  4. Blepharitis  5. Pain: non verbal     PLAN      1.  and SW to support family needs  2. Continue GIP level of care as pt remains symptomatic and requires close monitoring and frequent IV medication administration and adjustments. 3. Increase morphine 3 mg IV every 2 hours scheduled and 4mg every 15 minutes as needed for pain and shortness of breath. 4. Scopolamine patch and Robinul 0.2 mill grams IV every 4 hours scheduled for secretions  5. Continue  eyedrops for comfort secondary to his blepharitis. 6. Continue  Toradol for PRN fever. 7. Continue comfort medications as needed for other symptoms to include fever, nausea, agitation  8. Plan reviewed with bedside nurse: Lela Lamas. 9. Disposition: return to prison once stable;unlikely as pt very ill and close to end; likely will die in hospital    Prognosis estimated based on 09/07/19 clinical assessment is:   [x] Hours to Days    [] Days to Weeks    [] Other:    Communicated plan of care with: Hospice Case Manager; Hospice IDT; Care Team     GOALS OF CARE     Patient/Medical POA stated Goal of Care: Hospice care    [x] I have reviewed and/or updated ACP information in the Advance Care Planning Navigator. This information is available in the [address] link in the patient's chart header.     Primary Decision Maker ([address]):     Resuscitation Status: DNR  If DNR is there a Durable DNR on file? : [x] Yes [] No (If no, complete Durable DNR)    HISTORY     History obtained from: Chart, daughter, bedside nurse    CHIEF COMPLAINT: Fever  The patient is:   [] Verbal  [x] Nonverbal  [] Unresponsive    HPI/SUBJECTIVE: Patient is nonverbal.  His eyes are open but unable to follow commands or respond. Appears fatigued, in distress, intermittently frowns and clenches his fist. Grimaces as well. Little short of breath  Warm to touch. Had low grade fever overnight.    Overnight received all scheduled robinul and morphine, got 1 prn toradol,        REVIEW OF SYSTEMS     The following systems were: [] reviewed  [x] unable to be reviewed    Adult Non-Verbal Pain Assessment Score 4    Face  [] 0   No particular expression or smile  [x] 1   Occasional grimace, tearing, frowning, wrinkled forehead  [] 2   Frequent grimace, tearing, frowning, wrinkled forehead    Activity (movement)  [] 0   Lying quietly, normal position  [x] 1   Seeking attention through movement or slow, cautious movement  [] 2   Restless, excessive activity and/or withdrawal reflexes    Guarding  [x] 0   Lying quietly, no positioning of hands over areas of body  [] 1   Splinting areas of the body, tense  [] 2   Rigid, stiff    Physiology (vital signs)  [] 0   Stable vital signs  [x] 1   Change in any of the following: SBP > 20mm Hg; HR > 20/minute  [] 2   Change in any of the following: SBP > 30mm Hg; HR > 25/minute    Respiratory  [] 0   Baseline RR/SpO2, compliant with ventilator  [x] 1   RR > 10 above baseline, or 5% drop SpO2, mild asynchrony with ventilator  [] 2   RR > 20 above baseline, or 10% drop SpO2, asynchrony with ventilator     FUNCTIONAL ASSESSMENT     Palliative Performance Scale (PPS): 10       PSYCHOSOCIAL/SPIRITUAL ASSESSMENT     Active Problems:    Sepsis ([facility] (9/1/2019)      Past Medical History:   Diagnosis Date    CHF (congestive heart failure) ([facility] [address] 9/1/2019    [facility] 9/[facility]    H/O tracheostomy 9/1/2019    History of completed stroke 9/1/2019      No past surgical history on file. Social History     Tobacco Use    Smoking status: Unknown If Ever Smoked   Substance Use Topics    Alcohol use: Not Currently     Family History   Family history unknown: Yes      No Known Allergies   Current Facility-Administered Medications   Medication Dose Route Frequency    ketorolac (TORADOL) injection 15 mg  15 mg IntraVENous Q6H PRN    acetaminophen (TYLENOL) suppository 650 mg  650 mg Rectal Q4H PRN    white petrolatum-mineral oil (LACRILUBE S.O.P.) ointment   Both Eyes Q12H    LORazepam (ATIVAN) injection 1 mg  1 mg IntraVENous Q15MIN PRN    ondansetron (ZOFRAN) injection 4 mg  4 mg IntraVENous Q4H PRN    scopolamine (TRANSDERM-SCOP) 1 mg over 3 days 1 Patch  1 Patch TransDERmal Q72H    glycopyrrolate (ROBINUL) injection 0.2 mg  0.2 mg IntraVENous Q4H    bisacodyl (DULCOLAX) suppository 10 mg  10 mg Rectal DAILY PRN    morphine injection 2 mg  2 mg IntraVENous Q15MIN PRN    morphine injection 2 mg  2 mg IntraVENous Q2H        PHYSICAL EXAM     Wt Readings from Last 3 Encounters:   09/01/19 65.8 kg (145 lb)   07/30/19 65.8 kg (145 lb)   04/03/19 65.8 kg (145 lb)       Visit Vitals  BP (!) 88/57 (BP 1 Location: Left arm, BP Patient Position: At rest)   Pulse (!) 58   Temp 97.7 °F (36.5 °C)   Resp 16   SpO2 (!) 89%       Supplemental O2  [x] Yes  [] NO  Last bowel movement:     Currently this patient has:  [x] Peripheral IV [] PICC  [] PORT [] ICD    [x] Cummings Catheter [] NG Tube   [x] PEG Tube    [] Rectal Tube [] Drain  [] Other:     Constitutional: Ill-appearing, ashen, nonverbal, smells, appears in distress/pain   Eyes: Eyes are open but sunken  ENMT: Trach in place.   No further oxygen required  Cardiovascular: Regular rhythm, bradycardic  Respiratory: Breathing has slowed, minimal secretions, minimal air movement  Gastrointestinal: Soft, PEG in place  Musculoskeletal: Muscle wasting, emaciated  Skin: Poor skin turgor, warm  Neurologic: Nonverbal, slightly restless  Psychiatric: Calm  Other:       Pertinent Lab and or Imaging Tests:  Lab Results   Component Value Date/Time    Sodium 139 09/01/2019 05:19 PM    Potassium 5.3 (H) 09/01/2019 05:19 PM    Chloride 101 09/01/2019 05:19 PM    CO2 28 09/01/2019 05:19 PM    Anion gap 10 09/01/2019 05:19 PM    Glucose 122 (H) 09/01/2019 05:19 PM    BUN 53 (H) 09/01/2019 05:19 PM    Creatinine 2.12 (H) 09/01/2019 05:19 PM    BUN/Creatinine ratio 25 (H) 09/01/2019 05:19 PM    GFR est AA 37 (L) 09/01/2019 05:19 PM    GFR est non-AA 31 (L) 09/01/2019 05:19 PM    Calcium 8.6 09/01/2019 05:19 PM     Lab Results   Component Value Date/Time    Protein, total 7.4 09/01/2019 05:19 PM    Albumin 2.2 (L) 09/01/2019 05:19 PM           Total time: 35mts  Counseling / coordination time: 15mts  > 50% counseling / coordination?:

## 2019-09-08 NOTE — PROGRESS NOTES
Problem: Falls - Risk of  Goal: *Absence of Falls  Description  Document Elvin Lowefelipe Fall Risk and appropriate interventions in the flowsheet. Outcome: Progressing Towards Goal  Note:   Fall Risk Interventions:  Mobility Interventions: Bed/chair exit alarm, OT consult for ADLs, Communicate number of staff needed for ambulation/transfer         Medication Interventions: Bed/chair exit alarm    Elimination Interventions: Call light in reach, Bed/chair exit alarm              Problem: Pressure Injury - Risk of  Goal: *Prevention of pressure injury  Description  Document Wes Scale and appropriate interventions in the flowsheet.   Outcome: Progressing Towards Goal  Note:   Pressure Injury Interventions:  Sensory Interventions: Check visual cues for pain, Assess need for specialty bed    Moisture Interventions: Absorbent underpads, Apply protective barrier, creams and emollients, Check for incontinence Q2 hours and as needed    Activity Interventions: Assess need for specialty bed    Mobility Interventions: Assess need for specialty bed, HOB 30 degrees or less    Nutrition Interventions: Document food/fluid/supplement intake    Friction and Shear Interventions: Apply protective barrier, creams and emollients                Problem: Non-Violent Restraints  Goal: *Removal from restraints as soon as assessed to be safe  Outcome: Progressing Towards Goal

## 2019-09-08 NOTE — PROGRESS NOTES
[facility]  [phone]    Patient Name: Duglas Giordano  YOB: 1945    Date of Provider Hospice Visit: 09/08/19    Level of Care:   [x] General Inpatient (GIP)    [] Routine   [] Respite    Current Location of Care:  [] [facility] [x] [facility] [] [address] [] [facility] [] Hospice House THE [facility], patient referred from:  [] [facility] [] [facility] [] [address] [] [facility] [] Home [] Other:     Date of Original Hospice Admission: 9/2/2019  Hospice Medical Director at time of admission: [id number] Diagnosis: Sepsis  Diagnoses RELATED to the terminal prognosis: Esophageal cancer, massive stroke, nonverbal, trach and PEG, severe protein malnutrition  Other Diagnoses:      HOSPICE SUMMARY     Duglas Giordano is a 69y.o. year old who was admitted to [facility]. Patient is a 72-year-old gentleman who has a [place] correctional inmate. Patient with a history of extensive stroke that has left him nonverbal and he also has required a PEG and a trach. He has a history of esophageal cancer which  unfortunately do not know a lot of the significant details. He presents to the emergency room with sepsis, source unknown. Patient hypotensive but with significant issues with secretions and shortness of breath. After discussion with his daughter via phone, agreement and transition to inpatient hospice care to focus on his comfort. Patient an inmate in the correctional facility. We had to obtain consents verbally via phone secondary to his daughter living in [place] and unable to travel to the hospital.  In addition, we would need permission from the correctional facility to allow visitation to the hospital.  After discussion with his daughter, she was not going to be able to travel to [place] secondary to her work schedule. She states she had seen him earlier this year and was okay with the focus to be comfort.     The patient's principle diagnosis has resulted in minimally responsive  Refer to LCD     Functionally, the patient's Karnofsky and/or Palliative Performance Scale has declined over a period of days to weeks and is estimated at 10. The patient is dependent on the following ADLs: All    Objective information that support this patients limited prognosis includes:  Fever to 105  Albumin of 2.2   chest x-ray with diffuse patchy infiltrates edema versus infection-      The patient/family chose comfort measures with the support of Hospice. HOSPICE DIAGNOSES   Active Symptoms:  1. Shortness of breath  2. Secretions  3. Restlessness  4. Blepharitis  5. Pain: non verbal     PLAN      1.  and SW to support family needs  2. Continue GIP level of care as pt remains symptomatic and requires close monitoring and frequent IV medication administration and adjustments. 3. Increase morphine 4 mg IV every 2 hours scheduled and 4mg every 15 minutes as needed for pain and shortness of breath. 4. Scopolamine patch and Robinul 0.2 mill grams IV every 4 hours scheduled for secretions  5. Continue  eyedrops for comfort secondary to his blepharitis. 6. Continue  Toradol for PRN fever. 7. Continue comfort medications as needed for other symptoms to include fever, nausea, agitation  8. Plan reviewed with bedside nurse: Daniela Pacheco. 9. Disposition: return to prison once stable;unlikely as pt very ill and close to end; likely will die in hospital    Prognosis estimated based on 09/08/19 clinical assessment is:   [x] Hours to Days    [] Days to Weeks    [] Other:    Communicated plan of care with: Hospice Case Manager; Hospice IDT; Care Team     GOALS OF CARE     Patient/Medical POA stated Goal of Care: Hospice care    [x] I have reviewed and/or updated ACP information in the Advance Care Planning Navigator. This information is available in the [address] link in the patient's chart header.     Primary Decision Maker ([address]):     Resuscitation Status: DNR  If DNR is there a Durable DNR on file? : [x] Yes [] No (If no, complete Durable DNR)    HISTORY     History obtained from: Chart, daughter, bedside nurse    CHIEF COMPLAINT: Fever  The patient is:   [] Verbal  [x] Nonverbal  [] Unresponsive    HPI/SUBJECTIVE: Patient is nonverbal.  His eyes are open but unable to follow commands or respond. Still with facial frowning and grimacing.    Pt did have a bowel movement yesterday night and also urine output slightly better: 400cc this last shift  Overnight received all scheduled robinul and morphine       REVIEW OF SYSTEMS     The following systems were: [] reviewed  [x] unable to be reviewed    Adult Non-Verbal Pain Assessment Score 2    Face  [] 0   No particular expression or smile  [x] 1   Occasional grimace, tearing, frowning, wrinkled forehead  [] 2   Frequent grimace, tearing, frowning, wrinkled forehead    Activity (movement)  [x] 0   Lying quietly, normal position  [] 1   Seeking attention through movement or slow, cautious movement  [] 2   Restless, excessive activity and/or withdrawal reflexes    Guarding  [x] 0   Lying quietly, no positioning of hands over areas of body  [] 1   Splinting areas of the body, tense  [] 2   Rigid, stiff    Physiology (vital signs)  [] 0   Stable vital signs  [x] 1   Change in any of the following: SBP > 20mm Hg; HR > 20/minute  [] 2   Change in any of the following: SBP > 30mm Hg; HR > 25/minute    Respiratory  [x] 0   Baseline RR/SpO2, compliant with ventilator  [] 1   RR > 10 above baseline, or 5% drop SpO2, mild asynchrony with ventilator  [] 2   RR > 20 above baseline, or 10% drop SpO2, asynchrony with ventilator     FUNCTIONAL ASSESSMENT     Palliative Performance Scale (PPS): 10       PSYCHOSOCIAL/SPIRITUAL ASSESSMENT     Active Problems:    Sepsis ([facility] [address] (9/1/2019)      Past Medical History:   Diagnosis Date    CHF (congestive heart failure) ([facility] [address] 9/1/2019    Esophageal cancer ([facility] [address] 9/1/2019    H/O tracheostomy 9/1/2019    History of completed stroke 9/1/2019      No past surgical history on file. Social History     Tobacco Use    Smoking status: Unknown If Ever Smoked   Substance Use Topics    Alcohol use: Not Currently     Family History   Family history unknown: Yes      No Known Allergies   Current Facility-Administered Medications   Medication Dose Route Frequency    morphine injection 3 mg  3 mg IntraVENous Q2H    morphine injection 4 mg  4 mg IntraVENous Q15MIN PRN    ketorolac (TORADOL) injection 15 mg  15 mg IntraVENous Q6H PRN    acetaminophen (TYLENOL) suppository 650 mg  650 mg Rectal Q4H PRN    white petrolatum-mineral oil (LACRILUBE S.O.P.) ointment   Both Eyes Q12H    LORazepam (ATIVAN) injection 1 mg  1 mg IntraVENous Q15MIN PRN    ondansetron (ZOFRAN) injection 4 mg  4 mg IntraVENous Q4H PRN    scopolamine (TRANSDERM-SCOP) 1 mg over 3 days 1 Patch  1 Patch TransDERmal Q72H    glycopyrrolate (ROBINUL) injection 0.2 mg  0.2 mg IntraVENous Q4H    bisacodyl (DULCOLAX) suppository 10 mg  10 mg Rectal DAILY PRN        PHYSICAL EXAM     Wt Readings from Last 3 Encounters:   09/01/19 65.8 kg (145 lb)   07/30/19 65.8 kg (145 lb)   04/03/19 65.8 kg (145 lb)       Visit Vitals  BP (!) 83/60   Pulse 77   Temp 98.1 °F (36.7 °C)   Resp 12   SpO2 90%       Supplemental O2  [x] Yes  [] NO  Last bowel movement:     Currently this patient has:  [x] Peripheral IV [] PICC  [] PORT [] ICD    [x] Cummings Catheter [] NG Tube   [x] PEG Tube    [] Rectal Tube [] Drain  [] Other:     Constitutional: Ill-appearing, ashen, nonverbal, smells, appears in distress/pain   Eyes: Eyes are open but sunken  ENMT: Trach in place.     Cardiovascular: Regular rhythm  Respiratory: Breathing has slowed, minimal secretions, minimal air movement  Gastrointestinal: Soft, PEG in place  Musculoskeletal: Muscle wasting, emaciated  Skin: Poor skin turgor, warm  Neurologic: Nonverbal  Psychiatric: Calm  Other:       Pertinent Lab and or Imaging Tests:  Lab Results   Component Value Date/Time    Sodium 139 09/01/2019 05:19 PM    Potassium 5.3 (H) 09/01/2019 05:19 PM    Chloride 101 09/01/2019 05:19 PM    CO2 28 09/01/2019 05:19 PM    Anion gap 10 09/01/2019 05:19 PM    Glucose 122 (H) 09/01/2019 05:19 PM    BUN 53 (H) 09/01/2019 05:19 PM    Creatinine 2.12 (H) 09/01/2019 05:19 PM    BUN/Creatinine ratio 25 (H) 09/01/2019 05:19 PM    GFR est AA 37 (L) 09/01/2019 05:19 PM    GFR est non-AA 31 (L) 09/01/2019 05:19 PM    Calcium 8.6 09/01/2019 05:19 PM     Lab Results   Component Value Date/Time    Protein, total 7.4 09/01/2019 05:19 PM    Albumin 2.2 (L) 09/01/2019 05:19 PM           Total time: 35mts  Counseling / coordination time: 15mts  > 50% counseling / coordination?:

## 2019-09-08 NOTE — ROUTINE PROCESS
Bedside shift change report given to [address] [place] [address] (oncoming nurse) by Elie Raymond (offgoing nurse). Report included the following information SBAR and Kardex.

## 2019-09-08 NOTE — HOSPICE
[facility]  [phone]    GIP Daily Nursing Note   Patient Name: Sly Seo  YOB: 1945  Age: 69 y.o. Date of Visit: 09/08/19  [facility]ility of [facility]  Patient Room: [facility]     Hospice Attending: Suman Carr MD  Hospice Diagnosis: Sepsis [facility]) [A41.9]    Level of Care: GIP    Current GIP Symptoms    1. Patient minimally responsive, lungs very coarse with long periods of apnea, over 25 seconds  2. Breathing is very shallow, skin is cool to touch, grimace noted in forehead. 3. Patient is hypotensive, dark naresh urine noted in [place]. ASSESSMENT & PLAN   Must update Plan of Care including visit frequencies for IDT members  1. Increase morphine to 4mg IV every 2 hours for increased pain, dyspnea. Prn available every 15 minutes  2. Robinul scheduled 0.2mg IV every 4 hours for secretions, scopolamine patch in place. 3.  Please turn and reposition patient every 4 hours, stoma is open to air from old trach. Spiritual Interventions: None at this time. Psych/ Social/ Emotional Interventions: Daughter is unable to visit at this time due to work constraints and location as she lives in [place]. Care Coordination Needs: Discussed with Dr. Shant Mohamud and New Orders discussed / approved with Dr. Sandy Albarran MD.    Description History and Chart Review   If this is initial GIP note must document RN assessment/MD communication in previous setting. Specifically document nursing/medication needs in last 24 hours to support GIP care  Narrative History of last 24 hours that demonstrates care cannot be provided in another setting:  Patient requiring IV medications, titration of medications, RN supervision, patient unstable for transport    What has been done to control the patient's symptoms in the last 24 hours? Increased morphine dose for uncontrolled pain seen by stefano, patient remains shackled and supervised by correction officers.      Does the patient currently require IV medications? yes  Does the patient currently require scheduled medications? yes  Does the patient currently require a PCA? no    List number of doses of PRN medications in last 24 hours:  Medication 1:  Number of doses:    Medication 2:   Number of doses:    Medication 3:   Number of doses:    Supporting documentation for GIP need for pain control:  [x] Frequent evaluation by a doctor, nurse practitioner, nurse   [x] Frequent medication adjustment    [x] IVs that cannot be administered at home   [] Aggressive pain management   [] Complicated technical delivery of medications              Supporting documentation for GIP need for symptom control:  [x]  Sudden decline necessitating intensive nursing intervention  []  Uncontrolled / intractable nausea or vomiting   []  Pathological fractures  []  Advanced open wounds requiring frequent skilled care  [] Unmanageable respiratory distress  [] New or worsening delirium   [] Delirium with behavior issues: Is 24 hour caregiver present due to safety concerns with agitation? (yes/no)  [x] Imminent death  with skilled nursing needs documented above    DISCHARGE PLANNING   Daily discharge planning required for GIP  1. Discharge Plan: Patient will likely pass at [facility], [email or web address] for transport as patient is actively dying. 2. Patient/Family teaching: discussed with bert signs and symptoms related to death and what patient might be experiencing  3.  Response to patient/family teaching: bert will follow their protocol upon patient's death    ASSESSMENT    KARNOFSKY: 10    Prognosis estimated based on 09/08/19 clinical assessment is:   [x] Few to Many Hours  [] Hours to Days   [] Few to Many Days   [] Days to Weeks   [] Few to Many Weeks   [] Weeks to Months   [] Few to Many Months    Quality Measure: Patient self-reports:  [] Yes    [x] No    ESAS:   Time of Assessment: 1500  Pain (1-10):8  Fatigue (1-10): 8  Shortness of breath (1-10)8  Nausea (1-10):5  Appetite (1-10):    Anxiety: (1-10):   Depression: (1-10):   Well-being: (1-10):   Constipation: _ Yes  x_ No  LAST BM: 09/08/19    CLINICAL INFORMATION     Patient Vitals for the past 12 hrs:   Temp Pulse Resp BP SpO2   09/08/19 0738 98.1 °F (36.7 °C) 77 12 (!) 83/60 90 %       Currently this patient has:  [] Supplemental O2   [x] IV    [] PICC      [] PORT   [] NG Tube    [] PEG Tube   [] Ostomy     [x] Cummings draining _dark amber______ urine  [] Other:     SIGNS/PHYSICAL FINDINGS     Skin (including wound):  [] Warm, dry, supple, intact and color normal for race  [] Warm   [x] Dry   [x] Cool     [] Clammy       [] Diaphoretic    Turgor   [] Normal   [x] Decreased  Color:   [] Pink   [] Pale   [] Cyanotic   [] Erythema   [] Jaundice   [x] Normal for Race  [x]  Wounds: open stoma from old trach site    Neuro:  [] Lethargy  [] Restlessness / agitation  [] Confusion / delirium  [] Hallucinations  [] Responds to maximal stimulation  [x] Unresponsive  [] Seizures     Cardiac:  [x] Dyspnea on Exertion  [] JVD  [] Murmur  [] Palpitations  [x] Hypotension  [] Hypertension  [] Tachycardia  [] Bradycardia  [x] Irregular HR  [x] Pulses Decreased  [] Pulses Absent  [x] Edema: 2+ bilateral edema in all extremities       [] Mottling:          Respiratory:  Breath sounds:    [x] Diminished   [] Wheeze   [x] Rhonchi   [] Rales   [] Even and unlabored  [x] Labored:     8       [] Cough   [] Non Productive   [] Productive    [] Description:           [] Deep suctioned   [] O2 at ___ LPM  [] High flow oxygen greater than 10 LPM  [] Bi-Pap    GI  [x] Abdomen (describe) soft, flat  [] Ascites  [] Nausea  [] Vomiting  [x] Incontinent of bowels  [x] Bowel sounds (yes/no)  [] Diarrhea  [] Constipation (see above including last bowel movement)  [] Checked for impaction  [x] Last BM 09/08/19    Nutrition  Diet:___NPO_______  Appetite:   [] Good   [] Fair   [] Poor   [] Tube Feeding       [] Voiding  [] Incontinent   [x] Emiliano    Musculoskeletal  [] Balance/Sutton Unsteady   [x] Weak   Strength:    [] Normal    [] Limited    [x] Decreasing   Activities:    [] Up as tolerated   [x] Bedridden    [] Specify:    SAFETY  [] 24 hr. Caregiver   [x] Side rails ?     [x] Hospital bed   [] Reviewed Falls & Safety       ALLERGIES AND MEDICATIONS     Allergies: No Known Allergies    Current Facility-Administered Medications   Medication Dose Route Frequency    morphine injection 4 mg  4 mg IntraVENous Q2H    morphine injection 4 mg  4 mg IntraVENous Q15MIN PRN    ketorolac (TORADOL) injection 15 mg  15 mg IntraVENous Q6H PRN    acetaminophen (TYLENOL) suppository 650 mg  650 mg Rectal Q4H PRN    white petrolatum-mineral oil (LACRILUBE S.O.P.) ointment   Both Eyes Q12H    LORazepam (ATIVAN) injection 1 mg  1 mg IntraVENous Q15MIN PRN    ondansetron (ZOFRAN) injection 4 mg  4 mg IntraVENous Q4H PRN    scopolamine (TRANSDERM-SCOP) 1 mg over 3 days 1 Patch  1 Patch TransDERmal Q72H    glycopyrrolate (ROBINUL) injection 0.2 mg  0.2 mg IntraVENous Q4H    bisacodyl (DULCOLAX) suppository 10 mg  10 mg Rectal DAILY PRN          Visit Time In:1500  Visit Time Out: 6964

## 2019-09-08 NOTE — PROGRESS NOTES
Bedside and Verbal shift change report given to [place], RN (oncoming nurse) by Rush Yu (offgoing nurse). Report included the following information SBAR, Kardex and ED Summary.

## 2019-09-08 NOTE — ROUTINE PROCESS
Bedside shift change report given to Bernie Broderick RN (oncoming nurse) by Karly Phoenix RN (offgoing nurse). Report included the following information SBAR, Kardex and MAR.

## 2019-09-09 NOTE — PROGRESS NOTES
Bedside and Verbal shift change report given to Rush Greenwood (oncoming nurse) by German Das (offgoing nurse). Report included the following information SBAR, Kardex, Procedure Summary, Intake/Output, MAR, Recent Results and Med Rec Status.

## 2019-09-09 NOTE — PROGRESS NOTES
Bedside and Verbal shift change report given to Mamta (oncoming nurse) by Jaime Carrillo (offgoing nurse). Report included the following information SBAR, Kardex, Intake/Output, MAR and Accordion.

## 2019-09-09 NOTE — HOSPICE
[facility]  [facility]  [phone]     [facility] Daily Nursing Note   Patient Name: Viola Tafoya  YOB: 1945  Age: 69 y.o.     Date of Visit: 09/08/19  [facility]ility of [facility]  Patient Room: [facility]     Hospice Attending: Froylan Pelaez MD  Hospice Diagnosis: Sepsis ([facility] [address] [A41.9]     Level of Care: GIP     Current GIP Symptoms    1. Patient minimally responsive, lungs very coarse. 2. Breathing is very shallow, skin is cool to touch, grimace noted in forehead. 3. Patient is hypotensive, dark naresh urine noted in [place].     ASSESSMENT & PLAN   Must update Plan of Care including visit frequencies for IDT members  1. Increase morphine to 4mg IV every 2 hours for increased pain, dyspnea. Prn available every 15 minutes  2. Robinul scheduled 0.2mg IV every 4 hours for secretions, scopolamine patch in place. 3.  Please turn and reposition patient every 4 hours, stoma is open to air from old trach.      Spiritual Interventions: Hospice chaplain visited today.     Psych/ Social/ Emotional Interventions: Daughter is unable to visit at this time due to work constraints and location as she lives in [facility].      Care Coordination Needs: Discussed with Dr. Shara Don and New Orders discussed / approved with Dr. Kevin Prescott MD.     Description History and Chart Review   INarrative History of last 24 hours that demonstrates care cannot be provided in another setting:  Patient requiring IV medications, titration of medications, RN supervision. What has been done to control the patient's symptoms in the last 24 hours? Patient's pain medication was increased yesterday to 4 mg q 2 hours.      Does the patient currently require IV medications? yes  Does the patient currently require scheduled medications? yes  Does the patient currently require a PCA?  no     List number of doses of PRN medications in last 24 hours:  Medication 1: Morphine  Number of doses: 1     Medication 2: Scopolamine Patch  Number of doses: 1     Medication 3:   Number of doses:     Supporting documentation for GIP need for pain control:  [x] Frequent evaluation by a doctor, nurse practitioner, nurse   [x] Frequent medication adjustment    [x] IVs that cannot be administered at home   [] Aggressive pain management   [] Complicated technical delivery of medications                                                                                                                                   Supporting documentation for GIP need for symptom control:  [x]  Sudden decline necessitating intensive nursing intervention  []  Uncontrolled / intractable nausea or vomiting   []  Pathological fractures  []  Advanced open wounds requiring frequent skilled care  [] Unmanageable respiratory distress  [] New or worsening delirium   [] Delirium with behavior issues: Is 24 hour caregiver present due to safety concerns with agitation? (yes/no)  [x] Imminent death  with skilled nursing needs documented above     DISCHARGE PLANNING     1. Discharge Plan: If patient's condition improves, he may be transferred to the [facility] or back to [facility]. 2. Patient/Family teaching: discussed with guards signs and symptoms related to death and what patient might be experiencing  3. Response to patient/family teaching: guards will follow their protocol upon patient's death     ASSESSMENT    KARNOFSKY: 10     Prognosis estimated based on 09/08/19 clinical assessment is:   [x] Few to Many Hours  [] Hours to Days   [] Few to Many Days   [] Days to Weeks   [] Few to Many Weeks   [] Weeks to Months   [] Few to Many Months     Quality Measure:       Patient self-reports:  [] Yes       [x] No     ESAS:   Time of Assessment: 1500  Pain (1-10):8  Fatigue (1-10): 8  Shortness of breath (1-10)5  Nausea (1-10):5  Appetite (1-10):    Anxiety: (1-10):   Depression: (1-10):   Well-being: (1-10):   Constipation: _ Yes  x_ No  LAST BM: 09/08/19     CLINICAL INFORMATION      Patient Vitals for the past 12 hrs:    Temp Pulse Resp BP SpO2   09/08/19 0738 98.1 °F (36.7 °C) 73 12 75/52 95 %         Currently this patient has:  [] Supplemental O2         [x] IV                      [] PICC                 [] PORT   [] NG Tube          [] PEG Tube        [] Ostomy     [x] Cummings draining _dark amber______ urine  [] Other:      SIGNS/PHYSICAL FINDINGS      Skin (including wound):  [] Warm, dry, supple, intact and color normal for race  [x] Warm   [x] Dry   [] Cool     [] Clammy       [] Diaphoretic    Turgor              [] Normal              [x] Decreased  Color:              [] Pink              [] Pale              [] Cyanotic              [] Erythema              [] Jaundice              [x] Normal for Race  [x]  Wounds: open stoma from old trach site     Neuro:  [] Lethargy  [] Restlessness / agitation  [] Confusion / delirium  [] Hallucinations  [] Responds to maximal stimulation  [x] Unresponsive  [] Seizures      Cardiac:  [x] Dyspnea on Exertion  [] JVD  [] Murmur  [] Palpitations  [x] Hypotension  [] Hypertension  [] Tachycardia  [] Bradycardia  [x] Irregular HR  [x] Pulses Decreased  [] Pulses Absent  [x] Edema: 2+ bilateral edema in all extremities       [] Mottling:           Respiratory:  Breath sounds:               [x] Diminished              [] Wheeze              [x] Rhonchi              [] Rales   [x] Even and unlabored 12  [] Labored:          [] Cough              [] Non Productive              [] Productive                          [] Description:           [] Deep suctioned   [] O2 at ___ LPM  [] High flow oxygen greater than 10 LPM  [] Bi-Pap     GI  [x] Abdomen (describe) soft, flat  [] Ascites  [] Nausea  [] Vomiting  [x] Incontinent of bowels  [x] Bowel sounds (yes/no)  [] Diarrhea  [] Constipation (see above including last bowel movement)  [] Checked for impaction  [x] Last BM 09/08/19     Nutrition  Diet:___NPO_______  Appetite:   [] Good   [] Fair   [] Poor   [] Tube Feeding        [] Voiding  [] Incontinent   [x] Cummings     Musculoskeletal  [] Balance/Success Unsteady   [x] Weak   Strength:               [] Normal               [] Limited               [x] Decreasing   Activities:               [] Up as tolerated              [x] Bedridden               [] Specify:     SAFETY  [] 24 hr. Caregiver   [x] Side rails ?     [x] Hospital bed   [] Reviewed Falls & Safety         ALLERGIES AND MEDICATIONS      Allergies: No Known Allergies            Current Facility-Administered Medications   Medication Dose Route Frequency    morphine injection 4 mg  4 mg IntraVENous Q2H    morphine injection 4 mg  4 mg IntraVENous Q15MIN PRN    ketorolac (TORADOL) injection 15 mg  15 mg IntraVENous Q6H PRN    acetaminophen (TYLENOL) suppository 650 mg  650 mg Rectal Q4H PRN    white petrolatum-mineral oil (LACRILUBE S.O.P.) ointment   Both Eyes Q12H    LORazepam (ATIVAN) injection 1 mg  1 mg IntraVENous Q15MIN PRN    ondansetron (ZOFRAN) injection 4 mg  4 mg IntraVENous Q4H PRN    scopolamine (TRANSDERM-SCOP) 1 mg over 3 days 1 Patch  1 Patch TransDERmal Q72H    glycopyrrolate (ROBINUL) injection 0.2 mg  0.2 mg IntraVENous Q4H    bisacodyl (DULCOLAX) suppository 10 mg  10 mg Rectal DAILY PRN             Visit Time In: 15:30  Visit Time Out: 16:00

## 2019-09-09 NOTE — HOSPICE
Routine spiritual care visit with the patient and 2 security officers. The patient was lying in bed with his eyes opened towards the TV. The patient did not look at the  but made facial movements while the  talked to him . The  offered words of comfort and blessing.

## 2019-09-09 NOTE — [facility] IDG CHAPLAIN NOTES
Patient: Flo Ferguson    Date: 09/09/19  Time: 11:39 AM    [facility]  Notes  Initial spiritual care visit by Riley Tovar and follow up by Zacarias Neumann in the past 2 weeks. During both visits patient was lying in bed with eyes open but unable to verbally respond. Patient does make facial movements when spoken to. Interventions:  Offered pastoral presence and a blessing. [place] attempted to reach family for assessment and support but no response. Goal for next 2 weeks:  Continue to provide pastoral presence and blessing as the patient reaches the end of his life and has not support from family or friends.       Signed by: Yousfi Bauer

## 2019-09-10 NOTE — HOSPICE
[address] [facility] Help to Those in Need  [phone]    GIP Daily Nursing Note   Patient Name: Christina Charles  YOB: 1945  Age: 69 y.o. Date of Visit: 09/10/19  Facility of Care: [facility]  Patient Room: [facility]/     Hospice Attending: Pauilne Diallo MD  Hospice Diagnosis: Sepsis [facility]) [A41.9]    Level of Care: GIP    Current GIP Symptoms    1. Shallow, unlabored respirations. Lungs coarse and diminished  2. Skin dry and cool  3. Remains Hypotensive  4. Eyes will open to verbal stimuli, slight grimacing to touch    ASSESSMENT & PLAN   Must update Plan of Care including visit frequencies for IDT members  1. Continue scheduled medications for pain and excessive secretions  2. Continue RN assessments to determine need for PRN medications  3. Administer PRN medications as indicated  4. Continue to turn and reposition as tolerated      Spiritual Interventions:  visits as indicated    Psych/ Social/ Emotional Interventions: Daughter is unable to visit at this time due to work constraints and location as she lives in [place].     Care Coordination Needs: continue to discuss w/ [id number] [facility] staff and team    Care plan and New Orders discussed / approved with Dr. Cindy Cervantes MD.    Description History and Chart Review   If this is initial GIP note must document RN assessment/MD communication in previous setting. Specifically document nursing/medication needs in last 24 hours to support GIP care  Narrative History of last 24 hours that demonstrates care cannot be provided in another setting:  Patient remains too unstable to transport, requires frequent IV medications for symptom management and frequent RN eval to assess effectiveness of medications    What has been done to control the patient's symptoms in the last 24 hours?   Continue IV pain medication, scheduled q2 hr for pain management, continue to titrate symptom medications as needed, continue frequent nursing assessments to assess effectiveness of medications    Does the patient currently require IV medications? yes  Does the patient currently require scheduled medications? yes  Does the patient currently require a PCA? no      Supporting documentation for GIP need for pain control:  [x] Frequent evaluation by a doctor, nurse practitioner, nurse   [x] Frequent medication adjustment    [x] IVs that cannot be administered at home   [] Aggressive pain management   [] Complicated technical delivery of medications              Supporting documentation for GIP need for symptom control:  []  Sudden decline necessitating intensive nursing intervention  []  Uncontrolled / intractable nausea or vomiting   []  Pathological fractures  []  Advanced open wounds requiring frequent skilled care  [] Unmanageable respiratory distress  [] New or worsening delirium   [] Delirium with behavior issues: Is 24 hour caregiver present due to safety concerns with agitation? (yes/no)  [x] Imminent death  with skilled nursing needs documented above    DISCHARGE PLANNING   Daily discharge planning required for GIP  1. Discharge Plan: If patient's condition improves, he may be transferred to the [facility] or back to [facility].    2. Patient/Family teaching: discussed with bert signs and symptoms related to death   3. Response to patient/family teaching: guards will follow their protocol upon patient's death    ASSESSMENT    KARNOFSKY: 10    Prognosis estimated based on 09/10/19 clinical assessment is:   [x] Few to Many Hours  [] Hours to Days   [] Few to Many Days   [] Days to Weeks   [] Few to Many Weeks   [] Weeks to Months   [] Few to Many Months    Quality Measure: Patient self-reports:  [] Yes    [x] No        CLINICAL INFORMATION     Patient Vitals for the past 12 hrs:   Temp Pulse Resp BP SpO2   09/10/19 0730 98.2 °F (36.8 °C) 68 10 94/58 97 %       Currently this patient has:  [] Supplemental O2   [x] IV    [] PICC      [] PORT   [] NG Tube    [] PEG Tube   [] Ostomy     [] Cummings draining _______ urine  [] Other:     SIGNS/PHYSICAL FINDINGS     Skin (including wound):  [] Warm, dry, supple, intact and color normal for race  [] Warm   [x] Dry   [x] Cool     [] Clammy       [] Diaphoretic    Turgor   [] Normal   [] Decreased  Color:   [] Pink   [] Pale   [] Cyanotic   [] Erythema   [] Jaundice   [x] Normal for Race  []  Wounds:    Neuro:  [] Lethargy  [] Restlessness / agitation  [] Confusion / delirium  [] Hallucinations  [x] Responds to verbal stimulation  [] Unresponsive  [] Seizures     Cardiac:  [] Dyspnea on Exertion  [] JVD  [] Murmur  [] Palpitations  [] Hypotension  [] Hypertension  [] Tachycardia  [] Bradycardia  [] Irregular HR  [] Pulses Decreased  [] Pulses Absent  [] Edema:       (Location, Grade and Pitting)  [] Mottling:      (Location)    Respiratory:  Breath sounds:    [x] Diminished   [] Wheeze   [x] Rhonchi   [] Rales   [x] Even and unlabored  [] Labored:            [] Cough   [] Non Productive   [] Productive    [] Description:           [] Deep suctioned   [] O2 at ___ LPM  [] High flow oxygen greater than 10 LPM  [] Bi-Pap    GI  [] Abdomen (describe)   [] Ascites  [] Nausea  [] Vomiting  [] Incontinent of bowels  [] Bowel sounds (yes/no)  [] Diarrhea  [] Constipation (see above including last bowel movement)  [] Checked for impaction  [x] Last BM 09/10    Nutrition  Diet:_NPO_________  Appetite:   [] Good   [] Fair   [] Poor   [] Tube Feeding       [] Voiding  [] Incontinent   [x] Cummings    Musculoskeletal  [] Balance/[place] Unsteady   [] Weak   Strength:    [] Normal    [] Limited    [] Decreasing   Activities:    [] Up as tolerated   [x] Bedridden    [] Specify:    SAFETY  [] 24 hr. Caregiver   [x] Side rails ?     [x] Hospital bed   [] Reviewed Falls & Safety       ALLERGIES AND MEDICATIONS     Allergies: No Known Allergies    Current Facility-Administered Medications   Medication Dose Route Frequency    morphine injection 4 mg  4 mg IntraVENous Q2H    morphine injection 4 mg  4 mg IntraVENous Q15MIN PRN    ketorolac (TORADOL) injection 15 mg  15 mg IntraVENous Q6H PRN    acetaminophen (TYLENOL) suppository 650 mg  650 mg Rectal Q4H PRN    white petrolatum-mineral oil (LACRILUBE S.O.P.) ointment   Both Eyes Q12H    LORazepam (ATIVAN) injection 1 mg  1 mg IntraVENous Q15MIN PRN    ondansetron (ZOFRAN) injection 4 mg  4 mg IntraVENous Q4H PRN    scopolamine (TRANSDERM-SCOP) 1 mg over 3 days 1 Patch  1 Patch TransDERmal Q72H    glycopyrrolate (ROBINUL) injection 0.2 mg  0.2 mg IntraVENous Q4H    bisacodyl (DULCOLAX) suppository 10 mg  10 mg Rectal DAILY PRN          Visit Time In: 0930  Visit Time Out: 1000

## 2019-09-10 NOTE — PROGRESS NOTES
Bedside and Verbal shift change report given to Summer RN/Isa RN   (oncoming nurse) by Theron Leventhal, RN  (offgoing nurse). Report included the following information SBAR, Kardex, MAR and Recent Results.

## 2019-09-10 NOTE — HOSPICE
[facility]  [phone]     Patient Name: Rosco Opitz  YOB: 1945  Age: 69 y.o. [address] [facility] RN Note:  Call placed to Betzy Melendez at [phone] to provide updates  Thank you for the opportunity to be of service to this patient.

## 2019-09-10 NOTE — PROGRESS NOTES
[facility] Help to Those in Need  [phone]    Patient Name: Vilma Flores  YOB: 1945    Date of Provider Hospice Visit: 09/09/19    Level of Care:   [x] General Inpatient (GIP)    [] Routine   [] Respite    Current Location of Care:  [] [facility] [x] [facility] [] [facility] [] [facility] [] Hospice House [facility], patient referred from:  [] [facility] [] [facility] [] [facility] [] [facility] [] Home [] Other:     Date of Original Hospice Admission: 9/2/2019  Hospice Medical Director at time of admission: [employer] Diagnosis: Sepsis  Diagnoses RELATED to the terminal prognosis: Esophageal cancer, massive stroke, nonverbal, trach and PEG, severe protein malnutrition  Other Diagnoses:      HOSPICE SUMMARY     Vilma Flores is a 69y.o. year old who was admitted to [facility]. Patient is a 32-year-old gentleman who has a [employer] correctional inmate. Patient with a history of extensive stroke that has left him nonverbal and he also has required a PEG and a trach. He has a history of esophageal cancer which  unfortunately do not know a lot of the significant details. He presents to the emergency room with sepsis, source unknown. Patient hypotensive but with significant issues with secretions and shortness of breath. After discussion with his daughter via phone, agreement and transition to inpatient hospice care to focus on his comfort. Patient an inmate in the correctional facility. We had to obtain consents verbally via phone secondary to his daughter living in [place] and unable to travel to the hospital.  In addition, we would need permission from the correctional facility to allow visitation to the hospital.  After discussion with his daughter, she was not going to be able to travel to [place] secondary to her work schedule. She states she had seen him earlier this year and was okay with the focus to be comfort.     The patient's principle diagnosis has resulted in minimally responsive  Refer to LCD     Functionally, the patient's Karnofsky and/or Palliative Performance Scale has declined over a period of days to weeks and is estimated at 10. The patient is dependent on the following ADLs: All    Objective information that support this patients limited prognosis includes:  Fever to 105  Albumin of 2.2   chest x-ray with diffuse patchy infiltrates edema versus infection-      The patient/family chose comfort measures with the support of Hospice. HOSPICE DIAGNOSES   Active Symptoms:  1. Shortness of breath  2. Secretions  3. Restlessness  4. Blepharitis  5. Pain: non verbal     PLAN      1.  and SW to support family needs  2. Continue GIP level of care as pt remains symptomatic and requires close monitoring and frequent IV medication administration and adjustments. Patient now today with his eyes were open. Appears to show some discomfort with grimacing at times and squeezing my hand. Patient continues to surprise all of us on his extended stay. He clearly looked very close to death on Friday. 3. Morphine was increased to 4 mg IV every 2 hours yesterday and every 15 minutes as needed. We will continue to monitor his needs. 4. Scopolamine patch and Robinul 0.2 mill grams IV every 4 hours scheduled for secretions  5. Continue  eyedrops for comfort secondary to his blepharitis. 6. Continue  Toradol for PRN fever. 7. Continue comfort medications as needed for other symptoms to include fever, nausea, agitation  8. Plan reviewed with bedside nurse: Carrie Sánchez-hospice liaison  9. Disposition: return to prison once stable;unlikely as pt very ill and close to end; likely will die in hospital.  Consider transfer to the community hospice house    Prognosis estimated based on 09/09/19 clinical assessment is:   [x] Hours to Days    [] Days to Weeks    [] Other:    Communicated plan of care with: Hospice Case Manager;  Hospice IDT; Care Team     GOALS OF CARE Patient/Medical POA stated Goal of Care: Hospice care    [x] I have reviewed and/or updated ACP information in the Advance Care Planning Navigator. This information is available in the [address] link in the patient's chart header. Primary Decision Maker (Health Care Agent):     Resuscitation Status: DNR  If DNR is there a Durable DNR on file? : [x] Yes [] No (If no, complete Durable DNR)    HISTORY     History obtained from: Chart, daughter, bedside nurse    CHIEF COMPLAINT: Fever  The patient is:   [] Verbal  [x] Nonverbal  [] Unresponsive    HPI/SUBJECTIVE: Patient is nonverbal.  His eyes are open but unable to follow commands or respond. Still with facial frowning and grimacing. Pt did have a bowel movement yesterday night and also urine output slightly better: 400cc this last shift  Overnight received all scheduled robinul and morphine    9/9patient much more awake but not alert. Grabbing my hand at times but it almost seems involuntary.   Occasional facial grimacing       REVIEW OF SYSTEMS     The following systems were: [] reviewed  [x] unable to be reviewed    Adult Non-Verbal Pain Assessment Score 2    Face  [] 0   No particular expression or smile  [x] 1   Occasional grimace, tearing, frowning, wrinkled forehead  [] 2   Frequent grimace, tearing, frowning, wrinkled forehead    Activity (movement)  [x] 0   Lying quietly, normal position  [] 1   Seeking attention through movement or slow, cautious movement  [] 2   Restless, excessive activity and/or withdrawal reflexes    Guarding  [x] 0   Lying quietly, no positioning of hands over areas of body  [] 1   Splinting areas of the body, tense  [] 2   Rigid, stiff    Physiology (vital signs)  [] 0   Stable vital signs  [x] 1   Change in any of the following: SBP > 20mm Hg; HR > 20/minute  [] 2   Change in any of the following: SBP > 30mm Hg; HR > 25/minute    Respiratory  [x] 0   Baseline RR/SpO2, compliant with ventilator  [] 1   RR > 10 above baseline, or 5% drop SpO2, mild asynchrony with ventilator  [] 2   RR > 20 above baseline, or 10% drop SpO2, asynchrony with ventilator     FUNCTIONAL ASSESSMENT     Palliative Performance Scale (PPS): 10       PSYCHOSOCIAL/SPIRITUAL ASSESSMENT     Active Problems:    Sepsis ([facility] (9/1/2019)      Past Medical History:   Diagnosis Date    [facility]    Esophageal cancer ([facility] 9/1/2019    H/O tracheostomy 9/1/2019    History of completed stroke 9/1/2019      No past surgical history on file.    Social History     Tobacco Use    Smoking status: Unknown If Ever Smoked   Substance Use Topics    Alcohol use: Not Currently     Family History   Family history unknown: Yes      No Known Allergies   Current Facility-Administered Medications   Medication Dose Route Frequency    morphine injection 4 mg  4 mg IntraVENous Q2H    morphine injection 4 mg  4 mg IntraVENous Q15MIN PRN    ketorolac (TORADOL) injection 15 mg  15 mg IntraVENous Q6H PRN    acetaminophen (TYLENOL) suppository 650 mg  650 mg Rectal Q4H PRN    white petrolatum-mineral oil (LACRILUBE S.O.P.) ointment   Both Eyes Q12H    LORazepam (ATIVAN) injection 1 mg  1 mg IntraVENous Q15MIN PRN    ondansetron (ZOFRAN) injection 4 mg  4 mg IntraVENous Q4H PRN    scopolamine (TRANSDERM-SCOP) 1 mg over 3 days 1 Patch  1 Patch TransDERmal Q72H    glycopyrrolate (ROBINUL) injection 0.2 mg  0.2 mg IntraVENous Q4H    bisacodyl (DULCOLAX) suppository 10 mg  10 mg Rectal DAILY PRN        PHYSICAL EXAM     Wt Readings from Last 3 Encounters:   09/01/19 145 lb (65.8 kg)   07/30/19 145 lb (65.8 kg)   04/03/19 145 lb (65.8 kg)       Visit Vitals  BP (!) 75/52 (BP 1 Location: Left arm, BP Patient Position: At rest)   Pulse 73   Temp 98.1 °F (36.7 °C)   Resp 12   SpO2 95%       Supplemental O2  [x] Yes  [] NO  Last bowel movement:     Currently this patient has:  [x] Peripheral IV [] PICC  [] PORT [] ICD    [x] Cummings Catheter [] NG Tube [x] PEG Tube    [] Rectal Tube [] Drain  [] Other:     Constitutional: Ill-appearing, ashen, nonverbal, occasional facial grimacing  Eyes: Eyes are open but sunken  ENMT: Trach in place.     Cardiovascular: Regular rhythm  Respiratory: Breathing has slowed, minimal secretions, minimal air movement  Gastrointestinal: Soft, PEG in place  Musculoskeletal: Muscle wasting, emaciated  Skin: Poor skin turgor, warm  Neurologic: Nonverbal  Psychiatric: Calm  Other:       Pertinent Lab and or Imaging Tests:  Lab Results   Component Value Date/Time    Sodium 139 09/01/2019 05:19 PM    Potassium 5.3 (H) 09/01/2019 05:19 PM    Chloride 101 09/01/2019 05:19 PM    CO2 28 09/01/2019 05:19 PM    Anion gap 10 09/01/2019 05:19 PM    Glucose 122 (H) 09/01/2019 05:19 PM    BUN 53 (H) 09/01/2019 05:19 PM    Creatinine 2.12 (H) 09/01/2019 05:19 PM    BUN/Creatinine ratio 25 (H) 09/01/2019 05:19 PM    GFR est AA 37 (L) 09/01/2019 05:19 PM    GFR est non-AA 31 (L) 09/01/2019 05:19 PM    Calcium 8.6 09/01/2019 05:19 PM     Lab Results   Component Value Date/Time    Protein, total 7.4 09/01/2019 05:19 PM    Albumin 2.2 (L) 09/01/2019 05:19 PM           Total time:   Counseling / coordination time:   > 50% counseling / coordination?:

## 2019-09-10 NOTE — PROGRESS NOTES
Problem: Pressure Injury - Risk of  Goal: *Prevention of pressure injury  Description  Document Wes Scale and appropriate interventions in the flowsheet.   Outcome: Progressing Towards Goal  Note:   Pressure Injury Interventions:  Sensory Interventions: Assess changes in LOC, Check visual cues for pain, Minimize linen layers    Moisture Interventions: Apply protective barrier, creams and emollients, Limit adult briefs, Minimize layers    Activity Interventions: Assess need for specialty bed, Increase time out of bed, Pressure redistribution bed/mattress(bed type)    Mobility Interventions: HOB 30 degrees or less, Pressure redistribution bed/mattress (bed type)    Nutrition Interventions: Document food/fluid/supplement intake    Friction and Shear Interventions: Apply protective barrier, creams and emollients, Minimize layers, Lift sheet                Problem: Patient Education: Go to Patient Education Activity  Goal: Patient/Family Education  Outcome: Progressing Towards Goal     Problem: Non-Violent Restraints  Goal: *Removal from restraints as soon as assessed to be safe  Outcome: Progressing Towards Goal  Goal: *No harm/injury to patient while restraints in use  Outcome: Progressing Towards Goal  Goal: Non-violent Restaints:Standard Interventions  Outcome: Progressing Towards Goal  Goal: Non-violent Restraints:Patient Interventions  Outcome: Progressing Towards Goal

## 2019-09-10 NOTE — PROGRESS NOTES
Bedside and Verbal shift change report given to Christina Prieto (oncoming nurse) by Alexander Hayes (offgoing nurse). Report included the following information SBAR, Kardex, Intake/Output, MAR and Accordion.

## 2019-09-11 NOTE — PROGRESS NOTES
[facility] Help to Those in Need  [phone]    Patient Name: Royce Roberson  YOB: 1945    Date of Provider Hospice Visit: 09/10/19    Level of Care:   [x] General Inpatient (GIP)    [] Routine   [] Respite    Current Location of Care:  [] [facility] [x] [facility] [] [facility] [] [facility] [] Hospice House [facility], patient referred from:  [] [facility] [] [facility] [] [facility] [] [facility] [] Home [] Other:     Date of Original Hospice Admission: 9/2/2019  Hospice Medical Director at time of admission: [employer] Diagnosis: Sepsis  Diagnoses RELATED to the terminal prognosis: Esophageal cancer, massive stroke, nonverbal, trach and PEG, severe protein malnutrition  Other Diagnoses:      HOSPICE SUMMARY     Royce Roberson is a 69y.o. year old who was admitted to [address] [place] [address]. Patient is a 72-year-old gentleman who has a [place] correctional inmate. Patient with a history of extensive stroke that has left him nonverbal and he also has required a PEG and a trach. He has a history of esophageal cancer which  unfortunately do not know a lot of the significant details. He presents to the emergency room with sepsis, source unknown. Patient hypotensive but with significant issues with secretions and shortness of breath. After discussion with his daughter via phone, agreement and transition to inpatient hospice care to focus on his comfort. Patient an inmate in the correctional facility. We had to obtain consents verbally via phone secondary to his daughter living in [place] and unable to travel to the hospital.  In addition, we would need permission from the correctional facility to allow visitation to the hospital.  After discussion with his daughter, she was not going to be able to travel to [place] secondary to her work schedule. She states she had seen him earlier this year and was okay with the focus to be comfort.     The patient's principle diagnosis has resulted in minimally responsive  Refer to LCD     Functionally, the patient's Karnofsky and/or Palliative Performance Scale has declined over a period of days to weeks and is estimated at 10. The patient is dependent on the following ADLs: All    Objective information that support this patients limited prognosis includes:  Fever to 105  Albumin of 2.2   chest x-ray with diffuse patchy infiltrates edema versus infection-      The patient/family chose comfort measures with the support of Hospice. HOSPICE DIAGNOSES   Active Symptoms:  1. Shortness of breath  2. Secretions  3. Restlessness  4. Blepharitis  5. Pain: non verbal     PLAN      1.  and SW to support family needs  2. Continue GIP level of care as patient continues show decline and ongoing need for IV medication for symptom management as well as frequent nursing assessment. Continues to show intermittent signs of nonverbal cues of pain that has required adjustment in his IV opioids. Appears a little more comfortable today compared to yesterday. 3. Morphine now 4 mg IV every 2 hours yesterday and every 15 minutes as needed. We will continue to monitor his needs. 4. Scopolamine patch and Robinul 0.2 mill grams IV every 4 hours scheduled for secretions  5. Continue  eyedrops for comfort secondary to his blepharitis. 6. Continue  Toradol for PRN fever. 7. Continue comfort medications as needed for other symptoms to include fever, nausea, agitation  8. Plan reviewed with bedside nurse: Radha-hospice liaison  9. Disposition: return to prison once stable;unlikely as pt very ill and close to end; likely will die in hospital.  Consider transfer to the community hospice house    Prognosis estimated based on 09/10/19 clinical assessment is:   [x] Hours to Days    [] Days to Weeks    [] Other:    Communicated plan of care with: Hospice Case Manager;  Hospice IDT; Care Team     GOALS OF CARE     Patient/Medical POA stated Goal of Care: Hospice care    [x] I have reviewed and/or updated ACP information in the Advance Care Planning Navigator. This information is available in the [address] link in the patient's chart header. Primary Decision Maker (Health Care Agent):     Resuscitation Status: DNR  If DNR is there a Durable DNR on file? : [x] Yes [] No (If no, complete Durable DNR)    HISTORY     History obtained from: Chart, daughter, bedside nurse    CHIEF COMPLAINT: Fever  The patient is:   [] Verbal  [x] Nonverbal  [] Unresponsive    HPI/SUBJECTIVE: Patient is nonverbal.  His eyes are open but unable to follow commands or respond. Still with facial frowning and grimacing. Pt did have a bowel movement yesterday night and also urine output slightly better: 400cc this last shift  Overnight received all scheduled robinul and morphine    9/9patient much more awake but not alert. Grabbing my hand at times but it almost seems involuntary. Occasional facial grimacing    9/10less grimacing today. Not as awake.        REVIEW OF SYSTEMS     The following systems were: [] reviewed  [x] unable to be reviewed    Adult Non-Verbal Pain Assessment Score 2    Face  [] 0   No particular expression or smile  [x] 1   Occasional grimace, tearing, frowning, wrinkled forehead  [] 2   Frequent grimace, tearing, frowning, wrinkled forehead    Activity (movement)  [x] 0   Lying quietly, normal position  [] 1   Seeking attention through movement or slow, cautious movement  [] 2   Restless, excessive activity and/or withdrawal reflexes    Guarding  [x] 0   Lying quietly, no positioning of hands over areas of body  [] 1   Splinting areas of the body, tense  [] 2   Rigid, stiff    Physiology (vital signs)  [] 0   Stable vital signs  [x] 1   Change in any of the following: SBP > 20mm Hg; HR > 20/minute  [] 2   Change in any of the following: SBP > 30mm Hg; HR > 25/minute    Respiratory  [x] 0   Baseline RR/SpO2, compliant with ventilator  [] 1   RR > 10 above baseline, or 5% drop SpO2, mild asynchrony with ventilator  [] 2   RR > 20 above baseline, or 10% drop SpO2, asynchrony with ventilator     FUNCTIONAL ASSESSMENT     Palliative Performance Scale (PPS): 10       PSYCHOSOCIAL/SPIRITUAL ASSESSMENT     Active Problems:    Sepsis ([facility] (9/1/2019)      Past Medical History:   Diagnosis Date    CHF (congestive heart failure) ([facility] 9/1/2019    Esophageal cancer ([facility] 9/1/2019    H/O tracheostomy 9/1/2019    History of completed stroke 9/1/2019      No past surgical history on file.    Social History     Tobacco Use    Smoking status: Unknown If Ever Smoked   Substance Use Topics    Alcohol use: Not Currently     Family History   Family history unknown: Yes      No Known Allergies   Current Facility-Administered Medications   Medication Dose Route Frequency    morphine injection 4 mg  4 mg IntraVENous Q2H    morphine injection 4 mg  4 mg IntraVENous Q15MIN PRN    ketorolac (TORADOL) injection 15 mg  15 mg IntraVENous Q6H PRN    acetaminophen (TYLENOL) suppository 650 mg  650 mg Rectal Q4H PRN    white petrolatum-mineral oil (LACRILUBE S.O.P.) ointment   Both Eyes Q12H    LORazepam (ATIVAN) injection 1 mg  1 mg IntraVENous Q15MIN PRN    ondansetron (ZOFRAN) injection 4 mg  4 mg IntraVENous Q4H PRN    scopolamine (TRANSDERM-SCOP) 1 mg over 3 days 1 Patch  1 Patch TransDERmal Q72H    glycopyrrolate (ROBINUL) injection 0.2 mg  0.2 mg IntraVENous Q4H    bisacodyl (DULCOLAX) suppository 10 mg  10 mg Rectal DAILY PRN        PHYSICAL EXAM     Wt Readings from Last 3 Encounters:   09/01/19 145 lb (65.8 kg)   07/30/19 145 lb (65.8 kg)   04/03/19 145 lb (65.8 kg)       Visit Vitals  BP 94/58 (BP 1 Location: Left arm, BP Patient Position: At rest)   Pulse 68   Temp 98.2 °F (36.8 °C)   Resp 10   SpO2 97%       Supplemental O2  [x] Yes  [] NO  Last bowel movement:     Currently this patient has:  [x] Peripheral IV [] PICC  [] PORT [] ICD    [x] Cummings Catheter [] NG Tube   [x] PEG Tube    [] Rectal Tube [] Drain  [] Other:     Constitutional: Ill-appearing, ashen, nonverbal, minimal grimacing  Eyes: Eyes are open but sunken  ENMT: Trach in place.     Cardiovascular: Regular rhythm  Respiratory: Breathing has slowed, minimal secretions, minimal air movement  Gastrointestinal: Soft, PEG in place  Musculoskeletal: Muscle wasting, emaciated  Skin: Poor skin turgor, warm  Neurologic: Nonverbal  Psychiatric: Calm  Other:       Pertinent Lab and or Imaging Tests:  Lab Results   Component Value Date/Time    Sodium 139 09/01/2019 05:19 PM    Potassium 5.3 (H) 09/01/2019 05:19 PM    Chloride 101 09/01/2019 05:19 PM    CO2 28 09/01/2019 05:19 PM    Anion gap 10 09/01/2019 05:19 PM    Glucose 122 (H) 09/01/2019 05:19 PM    BUN 53 (H) 09/01/2019 05:19 PM    Creatinine 2.12 (H) 09/01/2019 05:19 PM    BUN/Creatinine ratio 25 (H) 09/01/2019 05:19 PM    GFR est AA 37 (L) 09/01/2019 05:19 PM    GFR est non-AA 31 (L) 09/01/2019 05:19 PM    Calcium 8.6 09/01/2019 05:19 PM     Lab Results   Component Value Date/Time    Protein, total 7.4 09/01/2019 05:19 PM    Albumin 2.2 (L) 09/01/2019 05:19 PM           Total time:   Counseling / coordination time:   > 50% counseling / coordination?:

## 2019-09-11 NOTE — PROGRESS NOTES
visited patient, Jean Levi, for EOL support. Jean Levi was lying in bed and appeared to be comfortable. Two guards were present in the room at the time of the 's visit. Jean Levi briefly opened his eyes when the  spoke his name.  provided supportive presence and blessing of peace and comfort.  will follow up as able and/or needed  [facility]. Tisha Crowley.      Paging Service: [phone])

## 2019-09-11 NOTE — PROGRESS NOTES
Bedside shift change report given to Nicole (oncoming nurse) by RN (offgoing nurse). Report included the following information SBAR, Kardex, Intake/Output and Recent Results. Bedside shift change report given to Rachele Gu (oncoming nurse) by Sukhdeep Bowens (offgoing nurse). Report included the following information SBAR, Kardex, Intake/Output and Recent Results.

## 2019-09-11 NOTE — PROGRESS NOTES
Bedside shift change report given to Sarika Fuentes and [employer] (oncoming nurses) by Denton Interiano (offgoing nurse). Report included the following information SBAR, Kardex, Intake/Output and MAR.

## 2019-09-11 NOTE — PROGRESS NOTES
[facility]  [phone]    Patient Name: Sweetie Arredondo  YOB: 1945    Date of Provider Hospice Visit: 09/11/19    Level of Care:   [x] General Inpatient (GIP)    [] Routine   [] Respite    Current Location of Care:  [] [facility] [x] [facility] [] [facility] [] [facility] [] [facility], patient referred from:  [] [facility] [] [facility] [] [facility] [] [facility] [] Home [] Other:     Date of Original Hospice Admission: 9/2/2019  Hospice Medical Director at time of admission: [employer] Diagnosis: Sepsis  Diagnoses RELATED to the terminal prognosis: Esophageal cancer, massive stroke, nonverbal, trach and PEG, severe protein malnutrition  Other Diagnoses:      HOSPICE SUMMARY     Sweetie Arredondo is a 69y.o. year old who was admitted to [address] [place] [address]. Patient is a 62-year-old gentleman who has a [place] correctional inmate. Patient with a history of extensive stroke that has left him nonverbal and he also has required a PEG and a trach. He has a history of esophageal cancer which  unfortunately do not know a lot of the significant details. He presents to the emergency room with sepsis, source unknown. Patient hypotensive but with significant issues with secretions and shortness of breath. After discussion with his daughter via phone, agreement and transition to inpatient hospice care to focus on his comfort. Patient an inmate in the correctional facility. We had to obtain consents verbally via phone secondary to his daughter living in [place] and unable to travel to the hospital.  In addition, we would need permission from the correctional facility to allow visitation to the hospital.  After discussion with his daughter, she was not going to be able to travel to [place] secondary to her work schedule. She states she had seen him earlier this year and was okay with the focus to be comfort.     The patient's principle diagnosis has resulted in minimally responsive  Refer to LCD     Functionally, the patient's Karnofsky and/or Palliative Performance Scale has declined over a period of days to weeks and is estimated at 10. The patient is dependent on the following ADLs: All    Objective information that support this patients limited prognosis includes:  Fever to 105  Albumin of 2.2   chest x-ray with diffuse patchy infiltrates edema versus infection-      The patient/family chose comfort measures with the support of Hospice. HOSPICE DIAGNOSES   Active Symptoms:  1. Shortness of breath  2. Secretions  3. Restlessness  4. Blepharitis  5. Pain: non verbal     PLAN      1.  and SW to support family needs  2. Continue GIP level of care as ongoing needs for pain/sob/restlessness and now requiring SC access for medications  3. Morphine now 4 mg SC every 2 hours yesterday and every 15 minutes as needed. We will continue to monitor his needs. 4. Scopolamine patch and Robinul 0.2 milligrams IV every 4 hours scheduled for secretions  5. Continue  eyedrops for comfort secondary to his blepharitis. 6. Continue  Toradol for PRN fever. 7. Continue comfort medications as needed for other symptoms to include fever, nausea, agitation  8. Plan reviewed with bedside nurse: Carrie-hospice liaison  9. Disposition: return to prison once stable;unlikely as pt very ill and close to end; likely will die in hospital.  Consider transfer to the community hospice house    Prognosis estimated based on 09/11/19 clinical assessment is:   [x] Hours to Days    [] Days to Weeks    [] Other:    Communicated plan of care with: Hospice Case Manager; Hospice IDT; Care Team     GOALS OF CARE     Patient/Medical POA stated Goal of Care: Hospice care    [x] I have reviewed and/or updated ACP information in the Advance Care Planning Navigator. This information is available in the [facility] [address] link in the patient's chart header.     Primary Decision Maker [facility] Agent): Resuscitation Status: DNR  If DNR is there a Durable DNR on file? : [x] Yes [] No (If no, complete Durable DNR)    HISTORY     History obtained from: Chart, daughter, bedside nurse    CHIEF COMPLAINT: Fever  The patient is:   [] Verbal  [x] Nonverbal  [] Unresponsive    HPI/SUBJECTIVE: Patient is nonverbal.  His eyes are open but unable to follow commands or respond. Still with facial frowning and grimacing. Pt did have a bowel movement yesterday night and also urine output slightly better: 400cc this last shift  Overnight received all scheduled robinul and morphine    9/9patient much more awake but not alert. Grabbing my hand at times but it almost seems involuntary. Occasional facial grimacing    9/10less grimacing today. Not as awake.     9/11-appears comfortable right now       REVIEW OF SYSTEMS     The following systems were: [] reviewed  [x] unable to be reviewed    Adult Non-Verbal Pain Assessment Score 2    Face  [] 0   No particular expression or smile  [x] 1   Occasional grimace, tearing, frowning, wrinkled forehead  [] 2   Frequent grimace, tearing, frowning, wrinkled forehead    Activity (movement)  [x] 0   Lying quietly, normal position  [] 1   Seeking attention through movement or slow, cautious movement  [] 2   Restless, excessive activity and/or withdrawal reflexes    Guarding  [x] 0   Lying quietly, no positioning of hands over areas of body  [] 1   Splinting areas of the body, tense  [] 2   Rigid, stiff    Physiology (vital signs)  [] 0   Stable vital signs  [x] 1   Change in any of the following: SBP > 20mm Hg; HR > 20/minute  [] 2   Change in any of the following: SBP > 30mm Hg; HR > 25/minute    Respiratory  [x] 0   Baseline RR/SpO2, compliant with ventilator  [] 1   RR > 10 above baseline, or 5% drop SpO2, mild asynchrony with ventilator  [] 2   RR > 20 above baseline, or 10% drop SpO2, asynchrony with ventilator     FUNCTIONAL ASSESSMENT     Palliative Performance Scale (PPS): 10       PSYCHOSOCIAL/SPIRITUAL ASSESSMENT     Active Problems:    Sepsis ([facility] [address] (9/1/2019)      Past Medical History:   Diagnosis Date    CHF (congestive heart failure) ([facility] 9/1/2019    Esophageal cancer ([facility] 9/1/2019    H/O tracheostomy 9/1/2019    History of completed stroke 9/1/2019      No past surgical history on file. Social History     Tobacco Use    Smoking status: Unknown If Ever Smoked   Substance Use Topics    Alcohol use: Not Currently     Family History   Family history unknown: Yes      No Known Allergies   Current Facility-Administered Medications   Medication Dose Route Frequency    morphine injection 4 mg  4 mg IntraVENous Q2H    morphine injection 4 mg  4 mg IntraVENous Q15MIN PRN    acetaminophen (TYLENOL) suppository 650 mg  650 mg Rectal Q4H PRN    white petrolatum-mineral oil (LACRILUBE S.O.P.) ointment   Both Eyes Q12H    LORazepam (ATIVAN) injection 1 mg  1 mg IntraVENous Q15MIN PRN    ondansetron (ZOFRAN) injection 4 mg  4 mg IntraVENous Q4H PRN    scopolamine (TRANSDERM-SCOP) 1 mg over 3 days 1 Patch  1 Patch TransDERmal Q72H    glycopyrrolate (ROBINUL) injection 0.2 mg  0.2 mg IntraVENous Q4H    bisacodyl (DULCOLAX) suppository 10 mg  10 mg Rectal DAILY PRN        PHYSICAL EXAM     Wt Readings from Last 3 Encounters:   09/01/19 145 lb (65.8 kg)   07/30/19 145 lb (65.8 kg)   04/03/19 145 lb (65.8 kg)       Visit Vitals  BP (!) 85/59 (BP 1 Location: Left arm, BP Patient Position: At rest)   Pulse 72   Temp 97.4 °F (36.3 °C)   Resp 12   SpO2 91%       Supplemental O2  [x] Yes  [] NO  Last bowel movement:     Currently this patient has:  [x] Peripheral IV [] PICC  [] PORT [] ICD    [x] Cummings Catheter [] NG Tube   [x] PEG Tube    [] Rectal Tube [] Drain  [] Other:     Constitutional: Ill-appearing, ashen, nonverbal, minimal grimacing  Eyes: Eyes are open but sunken  ENMT: Trach in place.     Cardiovascular: Regular rhythm  Respiratory: Breathing has slowed, minimal secretions, minimal air movement  Gastrointestinal: Soft, PEG in place  Musculoskeletal: Muscle wasting, emaciated  Skin: Poor skin turgor, warm  Neurologic: Nonverbal  Psychiatric: Calm  Other:       Pertinent Lab and or Imaging Tests:  Lab Results   Component Value Date/Time    Sodium 139 09/01/2019 05:19 PM    Potassium 5.3 (H) 09/01/2019 05:19 PM    Chloride 101 09/01/2019 05:19 PM    CO2 28 09/01/2019 05:19 PM    Anion gap 10 09/01/2019 05:19 PM    Glucose 122 (H) 09/01/2019 05:19 PM    BUN 53 (H) 09/01/2019 05:19 PM    Creatinine 2.12 (H) 09/01/2019 05:19 PM    BUN/Creatinine ratio 25 (H) 09/01/2019 05:19 PM    GFR est AA 37 (L) 09/01/2019 05:19 PM    GFR est non-AA 31 (L) 09/01/2019 05:19 PM    Calcium 8.6 09/01/2019 05:19 PM     Lab Results   Component Value Date/Time    Protein, total 7.4 09/01/2019 05:19 PM    Albumin 2.2 (L) 09/01/2019 05:19 PM           Total time:   Counseling / coordination time:   > 50% counseling / coordination?:

## 2019-09-11 NOTE — HOSPICE
[facility]  [facility]  [phone]     GIP Daily Nursing Note   Patient Name: Sly Seo  YOB: 1945  Age: 69 y.o.     Date of Visit: 09/11/19  Facility of Care: [facility]  Patient Room: [facility]     Hospice Attending: Suman Carr MD  Hospice Diagnosis: Sepsis ([address] [A41.9]     Level of Care: GIP     Current GIP Symptoms    1. Shallow, unlabored respirations. Lungs coarse and diminished  2. Skin dry and cool  3. Remains Hypotensive  4. Eyes will open to verbal stimuli, slight grimacing to touch     ASSESSMENT & PLAN     1. Continue scheduled medications for pain and excessive secretions  2. Continue RN assessments to determine need for PRN medications  3. Administer PRN medications as indicated  4. Continue to turn and reposition as tolerated        Spiritual Interventions:  visits as indicated     Psych/ Social/ Emotional Interventions: Daughter is unable to visit at this time due to work constraints and location as she lives in [place].      Care Coordination Needs: continue to discuss w/ [id number] staff and team     Care plan and New Orders discussed / approved with Dr. Nigel Crane MD.     Description History and Chart Review   If this is initial GIP note must document RN assessment/MD communication in previous setting. Specifically document nursing/medication needs in last 24 hours to support GIP care  Narrative History of last 24 hours that demonstrates care cannot be provided in another setting:  Patient remains too unstable to transport, requires frequent IV medications for symptom management and frequent RN eval to assess effectiveness of medications     What has been done to control the patient's symptoms in the last 24 hours?   Continue IV pain medication, scheduled q2 hr for pain management, continue to titrate symptom medications as needed, continue frequent nursing assessments to assess effectiveness of medications     Does the patient currently require IV medications? yes  Does the patient currently require scheduled medications? yes  Does the patient currently require a PCA? no        Supporting documentation for GIP need for pain control:  [x] Frequent evaluation by a doctor, nurse practitioner, nurse   [x] Frequent medication adjustment    [x] IVs that cannot be administered at home   [] Aggressive pain management   [] Complicated technical delivery of medications                                                                                                                                   Supporting documentation for GIP need for symptom control:  []  Sudden decline necessitating intensive nursing intervention  []  Uncontrolled / intractable nausea or vomiting   []  Pathological fractures  []  Advanced open wounds requiring frequent skilled care  [] Unmanageable respiratory distress  [] New or worsening delirium   [] Delirium with behavior issues: Is 24 hour caregiver present due to safety concerns with agitation? (yes/no)  [x] Imminent death  with skilled nursing needs documented above     DISCHARGE PLANNING   Daily discharge planning required for GIP  1.  Discharge Plan: If patient's condition improves, he may be transferred to the [facility] or back to [facility].   2. Patient/Family teaching: discussed with guards signs and symptoms related to death   3. Response to patient/family teaching: guards will follow their protocol upon patient's death     ASSESSMENT    KARNOFSKY: 10     Prognosis estimated based on 09/10/19 clinical assessment is:   [] Few to Many Hours  [x] Hours to Days   [] Few to Many Days   [] Days to Weeks   [] Few to Many Weeks   [] Weeks to Months   [] Few to Many Months     Quality Measure:       Patient self-reports:  [] Yes       [x] No           CLINICAL INFORMATION      Patient Vitals for the past 12 hrs:    Temp Pulse Resp BP SpO2   09/10/19 0730 98.2 °F (36.8 °C) 68 10 94/58 97 %         Currently this patient has:  [] Supplemental O2         [x] IV                      [] PICC                 [] PORT   [] NG Tube          [] PEG Tube        [] Ostomy     [x] Cummings draining clear naresh urine  [] Other:      SIGNS/PHYSICAL FINDINGS      Skin (including wound):  [] Warm, dry, supple, intact and color normal for race  [] Warm   [x] Dry   [x] Cool     [] Clammy       [] Diaphoretic    Turgor              [] Normal              [] Decreased  Color:              [] Pink              [] Pale              [] Cyanotic              [] Erythema              [] Jaundice              [x] Normal for Race  []  Wounds:     Neuro:  [] Lethargy  [] Restlessness / agitation  [] Confusion / delirium  [] Hallucinations  [x] Responds to verbal stimulation  [] Unresponsive  [] Seizures      Cardiac:  [] Dyspnea on Exertion  [] JVD  [] Murmur  [] Palpitations  [x] Hypotension  [] Hypertension  [] Tachycardia  [] Bradycardia  [] Irregular HR  [x] Pulses Decreased- pedal  [] Pulses Absent  [x] Edema:      feet     Respiratory:  Breath sounds:               [x] Diminished              [] Wheeze              [x] Rhonchi              [] Rales   [x] Even and unlabored  [] Labored:            [] Cough              [] Non Productive              [] Productive                          [] Description:           [] Deep suctioned   [] O2 at ___ LPM  [] High flow oxygen greater than 10 LPM  [] Bi-Pap     GI  [] Abdomen (describe)   [] Ascites  [] Nausea  [] Vomiting  [] Incontinent of bowels  [] Bowel sounds (yes/no)  [] Diarrhea  [] Constipation (see above including last bowel movement)  [] Checked for impaction  [x] Last BM 09/10     Nutrition  Diet:_NPO_________  Appetite:   [] Good   [] Fair   [] Poor   [] Tube Feeding        [] Voiding  [] Incontinent   [x] Cummings     Musculoskeletal  [] Balance/[place] Unsteady   [] Weak   Strength:               [] Normal               [] Limited               [] Decreasing   Activities:               [] Up as tolerated [x] Bedridden               [] Specify:     SAFETY  [] 24 hr. Caregiver   [x] Side rails ?     [x] Hospital bed   [] Reviewed Falls & Safety         ALLERGIES AND MEDICATIONS      Allergies: No Known Allergies            Current Facility-Administered Medications   Medication Dose Route Frequency    morphine injection 4 mg  4 mg IntraVENous Q2H    morphine injection 4 mg  4 mg IntraVENous Q15MIN PRN    ketorolac (TORADOL) injection 15 mg  15 mg IntraVENous Q6H PRN    acetaminophen (TYLENOL) suppository 650 mg  650 mg Rectal Q4H PRN    white petrolatum-mineral oil (LACRILUBE S.O.P.) ointment   Both Eyes Q12H    LORazepam (ATIVAN) injection 1 mg  1 mg IntraVENous Q15MIN PRN    ondansetron (ZOFRAN) injection 4 mg  4 mg IntraVENous Q4H PRN    scopolamine (TRANSDERM-SCOP) 1 mg over 3 days 1 Patch  1 Patch TransDERmal Q72H    glycopyrrolate (ROBINUL) injection 0.2 mg  0.2 mg IntraVENous Q4H    bisacodyl (DULCOLAX) suppository 10 mg  10 mg Rectal DAILY PRN             Visit Time In: 0930  Visit Time Out: 1000

## 2019-09-12 NOTE — HOSPICE
[employer]  [facility]  [phone]     [facility] Daily Nursing Note   Patient Name: Messi Murillo  Date of Birth: 1945  Age: 74 y.o.     Date of Visit: 09/12/19  Facility of [address] [place] [address]  Patient Room: [facility]     Hospice Attending: Dionne Haile MD  Hospice Diagnosis: Sepsis [facility]) [A41.9]     Level of Care: GIP     Current GIP Symptoms    1. Shallow, unlabored respirations. Lungs coarse and diminished  2. Skin dry and cool  3. Remains Hypotensive  4. Eyes open to verbal stimuli, slight grimacing to touch     ASSESSMENT & PLAN      1.  Continue scheduled medications for pain and excessive secretions  2. Continue RN assessments to determine need for PRN medications  3. Administer PRN medications as indicated  4. Continue to turn and reposition as tolerated        Spiritual Interventions:  visits as indicated     Psych/ Social/ Emotional Interventions: Permission granted from [facility] to have daughter on Telephone call to patient. Unable to reach daughter, left VM to arrange for TC to patient     Care Coordination Needs: continue to discuss w/ [id number] [facility] [address] staff and team     Care plan and New Orders discussed / approved with Dr. Kennedy Quick.     Description History and Chart Review   If this is initial GIP note must document RN assessment/MD communication in previous setting. Specifically document nursing/medication needs in last 24 hours to support GIP care  Narrative History of last 24 hours that demonstrates care cannot be provided in another setting:  Patient remains too unstable to transport, requires frequent IV medications for symptom management and frequent RN eval to assess effectiveness of medications     What has been done to control the patient's symptoms in the last 24 hours?   Continue IV pain medication, scheduled q2 hr for pain management, continue to titrate symptom medications as needed, continue frequent nursing assessments to assess effectiveness of medications     Does the patient currently require IV medications? yes  Does the patient currently require scheduled medications? yes  Does the patient currently require a PCA? no        Supporting documentation for GIP need for pain control:  [x] Frequent evaluation by a doctor, nurse practitioner, nurse   [x] Frequent medication adjustment    [x] IVs that cannot be administered at home   [] Aggressive pain management   [] Complicated technical delivery of medications                                                                                                                                   Supporting documentation for GIP need for symptom control:  []  Sudden decline necessitating intensive nursing intervention  []  Uncontrolled / intractable nausea or vomiting   []  Pathological fractures  []  Advanced open wounds requiring frequent skilled care  [] Unmanageable respiratory distress  [] New or worsening delirium   [] Delirium with behavior issues: Is 24 hour caregiver present due to safety concerns with agitation? (yes/no)  [x] Imminent death  with skilled nursing needs documented above     DISCHARGE PLANNING   Daily discharge planning required for GIP  1.  Discharge Plan: If patient's condition improves, he may be transferred to the [facility] or back to [facility].   2. Patient/Family teaching: discussed with guards signs and symptoms related to death   3. Response to patient/family teaching: guards will follow their protocol upon patient's death     ASSESSMENT    KARNOFSKY: 10     Prognosis estimated based on 09/10/19 clinical assessment is:   [] Few to Many Hours  [x] Hours to Days   [] Few to Many Days   [] Days to Weeks   [] Few to Many Weeks   [] Weeks to Months   [] Few to Many Months     Quality Measure:       Patient self-reports:  [] Yes       [x] No           CLINICAL INFORMATION      Patient Vitals for the past 12 hrs:    Temp Pulse Resp BP SpO2   09/12/19 0915 96.5 °F (35.8 °C) 76 10 93/54 100 %         Currently this patient has:  [] Supplemental Y7         [x] IV                      [] PICC                 [] PORT   [] NG Tube          [] PEG Tube        [] Ostomy     [x] Cummings draining clear naresh urine  [] Other:      SIGNS/PHYSICAL FINDINGS      Skin (including wound):  [] Warm, dry, supple, intact and color normal for race  [] Warm   [x] Dry   [x] Cool     [] Clammy       [] Diaphoretic    Turgor              [] Normal              [] Decreased  Color:              [] Pink              [] Pale              [] Cyanotic              [] Erythema              [] Jaundice              [x] Normal for Race  []  Wounds:     Neuro:  [] Lethargy  [] Restlessness / agitation  [] Confusion / delirium  [] Hallucinations  [x] Responds to verbal stimulation  [] Unresponsive  [] Seizures      Cardiac:  [] Dyspnea on Exertion  [] JVD  [] Murmur  [] Palpitations  [x] Hypotension  [] Hypertension  [] Tachycardia  [] Bradycardia  [] Irregular HR  [x] Pulses Decreased- pedal  [] Pulses Absent  [x] Edema:      feet     Respiratory:  Breath sounds:               [x] Diminished              [] Wheeze              [x] Rhonchi              [] Rales   [x] Even and unlabored  [] Labored:            [] Cough              [] Non Productive              [] Productive                          [] Description:           [] Deep suctioned   [] O2 at ___ LPM  [] High flow oxygen greater than 10 LPM  [] Bi-Pap     GI  [] Abdomen (describe)   [] Ascites  [] Nausea  [] Vomiting  [] Incontinent of bowels  [] Bowel sounds (yes/no)  [] Diarrhea  [] Constipation (see above including last bowel movement)  [] Checked for impaction  [x] Last BM 09/10     Nutrition  Diet:_NPO_________  Appetite:   [] Good   [] Fair   [] Poor   [] Tube Feeding        [] Voiding  [] Incontinent   [x] Cummings     Musculoskeletal  [] Balance/[place] Unsteady   [] Weak   Strength:               [] Normal               [] Limited               [] Decreasing Activities:               [] Up as tolerated              [x] Bedridden               [] Specify:     SAFETY  [] 24 hr. Caregiver   [x] Side rails ?    [x] Hospital bed   [] Reviewed Falls & Safety         ALLERGIES AND MEDICATIONS      Allergies: No Known Allergies                 Current Facility-Administered Medications   Medication Dose Route Frequency    morphine injection 4 mg  4 mg IntraVENous Q2H    morphine injection 4 mg  4 mg IntraVENous Q15MIN PRN    ketorolac (TORADOL) injection 15 mg  15 mg IntraVENous Q6H PRN    acetaminophen (TYLENOL) suppository 650 mg  650 mg Rectal Q4H PRN    white petrolatum-mineral oil (LACRILUBE S.O.P.) ointment   Both Eyes Q12H    LORazepam (ATIVAN) injection 1 mg  1 mg IntraVENous Q15MIN PRN    ondansetron (ZOFRAN) injection 4 mg  4 mg IntraVENous Q4H PRN    scopolamine (TRANSDERM-SCOP) 1 mg over 3 days 1 Patch  1 Patch TransDERmal Q72H    glycopyrrolate (ROBINUL) injection 0.2 mg  0.2 mg IntraVENous Q4H    bisacodyl (DULCOLAX) suppository 10 mg  10 mg Rectal DAILY PRN             Visit Time In: 1200  Visit Time [facility]: 0490

## 2019-09-12 NOTE — PROGRESS NOTES
Bedside shift change report given to Nicole (oncoming nurse) by Shaan Palomo (offgoing nurse). Report included the following information SBAR, Kardex, Intake/Output and Recent Results. Bedside shift change report given to Ruslan (oncoming nurse) by Josy Russell (offgoing nurse). Report included the following information SBAR, Kardex, Intake/Output and Recent Results.

## 2019-09-12 NOTE — PROGRESS NOTES
Bedside shift change report given to [employer] and [employer] (oncoming nurses) by Prieto Pacheco (offgoing nurse). Report included the following information:  SBAR, Kardex, Intake/Output, and MAR.

## 2019-09-12 NOTE — PROGRESS NOTES
[facility]  [phone]    Patient Name: Stephanie Miller  YOB: 1945    Date of Provider Hospice Visit: 09/12/19    Level of Care:   [x] General Inpatient (GIP)    [] Routine   [] Respite    Current Location of Care:  [] [facility] [x] [facility] [] [address] [] [facility] [] Hospice House THE [facility], patient referred from:  [] [facility] [] [facility] [] [address] [] [facility] [] Home [] Other:     Date of Original Hospice Admission: 9/2/2019  Hospice Medical Director at time of admission: [employer] Diagnosis: Sepsis  Diagnoses RELATED to the terminal prognosis: Esophageal cancer, massive stroke, nonverbal, trach and PEG, severe protein malnutrition  Other Diagnoses:      HOSPICE SUMMARY     Stephanie Miller is a 69y.o. year old who was admitted to [address] [place] [address]. Patient is a 72-year-old gentleman who has a [place] correctional inmate. Patient with a history of extensive stroke that has left him nonverbal and he also has required a PEG and a trach. He has a history of esophageal cancer which  unfortunately do not know a lot of the significant details. He presents to the emergency room with sepsis, source unknown. Patient hypotensive but with significant issues with secretions and shortness of breath. After discussion with his daughter via phone, agreement and transition to inpatient hospice care to focus on his comfort. Patient an inmate in the correctional facility. We had to obtain consents verbally via phone secondary to his daughter living in [place] and unable to travel to the hospital.  In addition, we would need permission from the correctional facility to allow visitation to the hospital.  After discussion with his daughter, she was not going to be able to travel to [place] secondary to her work schedule. She states she had seen him earlier this year and was okay with the focus to be comfort.     The patient's principle diagnosis has resulted in minimally responsive  Refer to LCD     Functionally, the patient's Karnofsky and/or Palliative Performance Scale has declined over a period of days to weeks and is estimated at 10. The patient is dependent on the following ADLs: All    Objective information that support this patients limited prognosis includes:  Fever to 105  Albumin of 2.2   chest x-ray with diffuse patchy infiltrates edema versus infection-      The patient/family chose comfort measures with the support of Hospice. HOSPICE DIAGNOSES   Active Symptoms:  1. Shortness of breath  2. Secretions  3. Restlessness  4. Blepharitis  5. Pain: non verbal     PLAN      1.  and SW to support family needs  2. Continue [facility] level care. Patient still needing frequent nursing assessment and IV medication for management. Once again, this situation has lasted much longer than any less could have predicted. At the same time, I still feel his inpatient stay is warranted given his decline and ongoing needs. I worry that he will not receive the same attention in the correctional facility. For this reason, he will continue inpatient stay at [facility]. 3. Continue morphine 4 mg SC every 2 hours yesterday and every 15 minutes as needed. We will continue to monitor his needs. Do not feel adjustments needed at this time. 4. Scopolamine patch and Robinul 0.2 milligrams SC every 4 hours scheduled for secretions  5. Continue  eyedrops for comfort secondary to his blepharitis. 6. Continue  Toradol for PRN fever. 7. Continue comfort medications as needed for other symptoms to include fever, nausea, agitation  8. Plan reviewed with bedside nurse: Radha-hospice liaison. Merry Martel was able to speak with patient's daughter who will call at her lunch break tomorrow to speak with her father.   9. Disposition: return to prison once stable;unlikely as pt very ill and close to end; likely will die in hospital.  Consider transfer to the community hospice house    Prognosis estimated based on 09/12/19 clinical assessment is:   [x] Hours to Days    [] Days to Weeks    [] Other:    Communicated plan of care with: Hospice Case Manager; Hospice IDT; Care Team     GOALS OF CARE     Patient/Medical POA stated Goal of Care: Hospice care    [x] I have reviewed and/or updated ACP information in the Advance Care Planning Navigator. This information is available in the [address] link in the patient's chart header. Primary Decision Maker (Health Care Agent):     Resuscitation Status: DNR  If DNR is there a Durable DNR on file? : [x] Yes [] No (If no, complete Durable DNR)    HISTORY     History obtained from: Chart, daughter, bedside nurse    CHIEF COMPLAINT: Fever  The patient is:   [] Verbal  [x] Nonverbal  [] Unresponsive    HPI/SUBJECTIVE: Patient is nonverbal.  His eyes are open but unable to follow commands or respond. Still with facial frowning and grimacing. Pt did have a bowel movement yesterday night and also urine output slightly better: 400cc this last shift  Overnight received all scheduled robinul and morphine    9/9patient much more awake but not alert. Grabbing my hand at times but it almost seems involuntary. Occasional facial grimacing    9/10less grimacing today. Not as awake. 9/11-appears comfortable right now    9/12 patient much more somnolent today. Eyes are not open.        REVIEW OF SYSTEMS     The following systems were: [] reviewed  [x] unable to be reviewed    Adult Non-Verbal Pain Assessment Score 2    Face  [] 0   No particular expression or smile  [x] 1   Occasional grimace, tearing, frowning, wrinkled forehead  [] 2   Frequent grimace, tearing, frowning, wrinkled forehead    Activity (movement)  [x] 0   Lying quietly, normal position  [] 1   Seeking attention through movement or slow, cautious movement  [] 2   Restless, excessive activity and/or withdrawal reflexes    Guarding  [x] 0   Lying quietly, no positioning of hands over areas of body  [] 1   Splinting areas of the body, tense  [] 2   Rigid, stiff    Physiology (vital signs)  [] 0   Stable vital signs  [x] 1   Change in any of the following: SBP > 20mm Hg; HR > 20/minute  [] 2   Change in any of the following: SBP > 30mm Hg; HR > 25/minute    Respiratory  [x] 0   Baseline RR/SpO2, compliant with ventilator  [] 1   RR > 10 above baseline, or 5% drop SpO2, mild asynchrony with ventilator  [] 2   RR > 20 above baseline, or 10% drop SpO2, asynchrony with ventilator     FUNCTIONAL ASSESSMENT     Palliative Performance Scale (PPS): 10       PSYCHOSOCIAL/SPIRITUAL ASSESSMENT     Active Problems:    Sepsis ([facility] (9/1/2019)      Past Medical History:   Diagnosis Date    CHF (congestive heart failure) ([facility] 9/1/2019    Esophageal cancer ([facility] 9/1/2019    H/O tracheostomy 9/1/2019    History of completed stroke 9/1/2019      No past surgical history on file.    Social History     Tobacco Use    Smoking status: Unknown If Ever Smoked   Substance Use Topics    Alcohol use: Not Currently     Family History   Family history unknown: Yes      No Known Allergies   Current Facility-Administered Medications   Medication Dose Route Frequency    morphine injection 4 mg  4 mg IntraVENous Q2H    morphine injection 4 mg  4 mg IntraVENous Q15MIN PRN    acetaminophen (TYLENOL) suppository 650 mg  650 mg Rectal Q4H PRN    white petrolatum-mineral oil (LACRILUBE S.O.P.) ointment   Both Eyes Q12H    LORazepam (ATIVAN) injection 1 mg  1 mg IntraVENous Q15MIN PRN    ondansetron (ZOFRAN) injection 4 mg  4 mg IntraVENous Q4H PRN    scopolamine (TRANSDERM-SCOP) 1 mg over 3 days 1 Patch  1 Patch TransDERmal Q72H    glycopyrrolate (ROBINUL) injection 0.2 mg  0.2 mg IntraVENous Q4H    bisacodyl (DULCOLAX) suppository 10 mg  10 mg Rectal DAILY PRN        PHYSICAL EXAM     Wt Readings from Last 3 Encounters:   09/01/19 145 lb (65.8 kg)   07/30/19 145 lb (65.8 kg)   04/03/19 145 lb (65.8 kg) Visit Vitals  BP 93/54 (BP 1 Location: Left arm, BP Patient Position: At rest)   Pulse 76   Temp 96.5 °F (35.8 °C)   Resp 12   SpO2 100%       Supplemental O2  [x] Yes  [] NO  Last bowel movement:     Currently this patient has:  [x] Peripheral IV [] PICC  [] PORT [] ICD    [x] Cummings Catheter [] NG Tube   [x] PEG Tube    [] Rectal Tube [] Drain  [] Other:     Constitutional: Ill-appearing, ashen, nonverbal, eyes closed today  Eyes: Eyes are open but sunken  ENMT: Trach in place.     Cardiovascular: Regular rhythm  Respiratory: Breathing has slowed, minimal secretions, minimal air movement  Gastrointestinal: Soft, PEG in place  Musculoskeletal: Muscle wasting, emaciated  Skin: Poor skin turgor, warm  Neurologic: Nonverbal  Psychiatric: Calm  Other:       Pertinent Lab and or Imaging Tests:  Lab Results   Component Value Date/Time    Sodium 139 09/01/2019 05:19 PM    Potassium 5.3 (H) 09/01/2019 05:19 PM    Chloride 101 09/01/2019 05:19 PM    CO2 28 09/01/2019 05:19 PM    Anion gap 10 09/01/2019 05:19 PM    Glucose 122 (H) 09/01/2019 05:19 PM    BUN 53 (H) 09/01/2019 05:19 PM    Creatinine 2.12 (H) 09/01/2019 05:19 PM    BUN/Creatinine ratio 25 (H) 09/01/2019 05:19 PM    GFR est AA 37 (L) 09/01/2019 05:19 PM    GFR est non-AA 31 (L) 09/01/2019 05:19 PM    Calcium 8.6 09/01/2019 05:19 PM     Lab Results   Component Value Date/Time    Protein, total 7.4 09/01/2019 05:19 PM    Albumin 2.2 (L) 09/01/2019 05:19 PM           Total time:   Counseling / coordination time:   > 50% counseling / coordination?:

## 2019-09-12 NOTE — HOSPICE
[facility]  [phone]     Patient Name: Stephanie Miller  YOB: 1945  Age: 69 y.o. [facility] RN Note:  Call to BYRON Santos  HOSPITAL liaison  today to update patient on plan of care. [facility] has reached out to Aman August to discuss patient family visitation and phone call access. Awaiting return call from [address] [place] [address] to have permission granted for phone call to daughter and/or brother. 1145: Spoke to [facility] regarding phone and visit permission. Per Oliver Wilde has granted a phone call from the daughter as long as it is an incoming call, on speaker setting w/ both guards present. Rani Wang did NOT bertha visitation from the brother, Chris Chanel. Will attempt to arrange telephone call from daughter this afternoon    Thank you for the opportunity to be of service to this patient.

## 2019-09-13 NOTE — PROGRESS NOTES
Problem: Falls - Risk of  Goal: *Absence of Falls  Description  Document Himanshu Alfonso Fall Risk and appropriate interventions in the flowsheet.   Outcome: Progressing Towards Goal  Note:   Fall Risk Interventions:  Mobility Interventions: Bed/chair exit alarm, Communicate number of staff needed for ambulation/transfer, Patient to call before getting OOB    Mentation Interventions: Bed/chair exit alarm, Family/sitter at bedside, Room close to nurse's station    Medication Interventions: Bed/chair exit alarm    Elimination Interventions: Bed/chair exit alarm, Call light in reach    History of Falls Interventions: Bed/chair exit alarm, Door open when patient unattended

## 2019-09-13 NOTE — PROGRESS NOTES
Bedside and Verbal shift change report given to [employer], RN (oncoming nurse) by Alistair Pelletier RN (offgoing nurse). Report included the following information SBAR, Kardex, MAR and Accordion.

## 2019-09-13 NOTE — PROGRESS NOTES
[facility]  [phone]    Patient Name: Vilma Flores  YOB: 1945    Date of Provider Hospice Visit: 09/13/19    Level of Care:   [x] General Inpatient (GIP)    [] Routine   [] Respite    Current Location of Care:  [] [facility] [x] [address] [] [facility] [] [facility] [] Hospice House THE [facility], patient referred from:  [] [facility] [] [address] [place] [address] [] [facility] [] [facility] [] Home [] Other:     Date of Original Hospice Admission: 9/2/2019  Hospice Medical Director at time of admission: [employer] Diagnosis: Sepsis  Diagnoses RELATED to the terminal prognosis: Esophageal cancer, massive stroke, nonverbal, trach and PEG, severe protein malnutrition  Other Diagnoses:      HOSPICE SUMMARY     Vilma Flores is a 69y.o. year old who was admitted to [facility]. Patient is a 79-year-old gentleman who has a [place] correctional inmate. Patient with a history of extensive stroke that has left him nonverbal and he also has required a PEG and a trach. He has a history of esophageal cancer which  unfortunately do not know a lot of the significant details. He presents to the emergency room with sepsis, source unknown. Patient hypotensive but with significant issues with secretions and shortness of breath. After discussion with his daughter via phone, agreement and transition to inpatient hospice care to focus on his comfort. Patient an inmate in the correctional facility. We had to obtain consents verbally via phone secondary to his daughter living in [place] and unable to travel to the hospital.  In addition, we would need permission from the correctional facility to allow visitation to the hospital.  After discussion with his daughter, she was not going to be able to travel to [address] [place] [address] secondary to her work schedule. She states she had seen him earlier this year and was okay with the focus to be comfort.     The patient's principle diagnosis has resulted in minimally responsive  Refer to LCD     Functionally, the patient's Karnofsky and/or Palliative Performance Scale has declined over a period of days to weeks and is estimated at 10. The patient is dependent on the following ADLs: All    Objective information that support this patients limited prognosis includes:  Fever to 105  Albumin of 2.2   chest x-ray with diffuse patchy infiltrates edema versus infection-      The patient/family chose comfort measures with the support of Hospice. HOSPICE DIAGNOSES   Active Symptoms:  1. Shortness of breath  2. Secretions  3. Restlessness  4. Blepharitis  5. Pain: non verbal     PLAN      1.  and SW to support family needs  2. Continue [facility] level care. Patient still needing frequent nursing assessment and IV medication for management. Once again, this situation has lasted much longer than any less could have predicted. At the same time, I still feel his inpatient stay is warranted given his decline and ongoing needs. I worry that he will not receive the same attention in the correctional facility. For this reason, he will continue inpatient stay at [place] [address]. 3. Continue morphine 4 mg SC every 2 hours yesterday and every 15 minutes as needed. We will continue to monitor his needs. Do not feel adjustments needed at this time. 4. Scopolamine patch and Robinul 0.2 milligrams SC every 4 hours scheduled for secretions  5. Continue  eyedrops for comfort secondary to his blepharitis. 6. Continue  Toradol for PRN fever. 7. Continue comfort medications as needed for other symptoms to include fever, nausea, agitation  8. Plan reviewed with bedside nurse: Radah-hospice liaison. Song Poser was able to have patient's daughter speak with him via phone. He seemed to respond some to her voice.   9. Disposition: return to prison once stable;unlikely as pt very ill and close to end; likely will die in hospital.  Consider transfer to the community hospice house    Prognosis estimated based on 09/13/19 clinical assessment is:   [x] Hours to Days    [] Days to Weeks    [] Other:    Communicated plan of care with: Hospice Case Manager; Hospice IDT; Care Team     GOALS OF CARE     Patient/Medical POA stated Goal of Care: Hospice care    [x] I have reviewed and/or updated ACP information in the Advance Care Planning Navigator. This information is available in the [address] link in the patient's chart header. Primary Decision Maker (Health Care Agent):     Resuscitation Status: DNR  If DNR is there a Durable DNR on file? : [x] Yes [] No (If no, complete Durable DNR)    HISTORY     History obtained from: Chart, daughter, bedside nurse    CHIEF COMPLAINT: Fever  The patient is:   [] Verbal  [x] Nonverbal  [] Unresponsive    HPI/SUBJECTIVE: Patient is nonverbal.  His eyes are open but unable to follow commands or respond. Still with facial frowning and grimacing. Pt did have a bowel movement yesterday night and also urine output slightly better: 400cc this last shift  Overnight received all scheduled robinul and morphine    9/9patient much more awake but not alert. Grabbing my hand at times but it almost seems involuntary. Occasional facial grimacing    9/10less grimacing today. Not as awake. 9/11-appears comfortable right now    9/12 patient much more somnolent today. Eyes are not open. 9/13patient clearly more ashen today. Eyes barely open.        REVIEW OF SYSTEMS     The following systems were: [] reviewed  [x] unable to be reviewed    Adult Non-Verbal Pain Assessment Score 2    Face  [] 0   No particular expression or smile  [x] 1   Occasional grimace, tearing, frowning, wrinkled forehead  [] 2   Frequent grimace, tearing, frowning, wrinkled forehead    Activity (movement)  [x] 0   Lying quietly, normal position  [] 1   Seeking attention through movement or slow, cautious movement  [] 2   Restless, excessive activity and/or withdrawal reflexes    Guarding  [x] 0   Lying quietly, no positioning of hands over areas of body  [] 1   Splinting areas of the body, tense  [] 2   Rigid, stiff    Physiology (vital signs)  [] 0   Stable vital signs  [x] 1   Change in any of the following: SBP > 20mm Hg; HR > 20/minute  [] 2   Change in any of the following: SBP > 30mm Hg; HR > 25/minute    Respiratory  [x] 0   Baseline RR/SpO2, compliant with ventilator  [] 1   RR > 10 above baseline, or 5% drop SpO2, mild asynchrony with ventilator  [] 2   RR > 20 above baseline, or 10% drop SpO2, asynchrony with ventilator     FUNCTIONAL ASSESSMENT     Palliative Performance Scale (PPS): 10       PSYCHOSOCIAL/SPIRITUAL ASSESSMENT     Active Problems:    Sepsis ([facility] (9/1/2019)      Past Medical History:   Diagnosis Date    CHF (congestive heart failure) ([facility] 9/1/2019    Esophageal cancer ([facility] 9/1/2019    H/O tracheostomy 9/1/2019    History of completed stroke 9/1/2019      No past surgical history on file.    Social History     Tobacco Use    Smoking status: Unknown If Ever Smoked   Substance Use Topics    Alcohol use: Not Currently     Family History   Family history unknown: Yes      No Known Allergies   Current Facility-Administered Medications   Medication Dose Route Frequency    morphine injection 4 mg  4 mg IntraVENous Q2H    morphine injection 4 mg  4 mg IntraVENous Q15MIN PRN    acetaminophen (TYLENOL) suppository 650 mg  650 mg Rectal Q4H PRN    white petrolatum-mineral oil (LACRILUBE S.O.P.) ointment   Both Eyes Q12H    LORazepam (ATIVAN) injection 1 mg  1 mg IntraVENous Q15MIN PRN    ondansetron (ZOFRAN) injection 4 mg  4 mg IntraVENous Q4H PRN    scopolamine (TRANSDERM-SCOP) 1 mg over 3 days 1 Patch  1 Patch TransDERmal Q72H    glycopyrrolate (ROBINUL) injection 0.2 mg  0.2 mg IntraVENous Q4H    bisacodyl (DULCOLAX) suppository 10 mg  10 mg Rectal DAILY PRN        PHYSICAL EXAM     Wt Readings from Last 3 Encounters:   09/01/19 145 lb (65.8 kg)   07/30/19 145 lb (65.8 kg)   04/03/19 145 lb (65.8 kg)       Visit Vitals  /69 (BP 1 Location: Left arm, BP Patient Position: Supine)   Pulse 75   Temp 97.5 °F (36.4 °C)   Resp 14   SpO2 97%       Supplemental O2  [x] Yes  [] NO  Last bowel movement:     Currently this patient has:  [x] Peripheral IV [] PICC  [] PORT [] ICD    [x] Cummings Catheter [] NG Tube   [x] PEG Tube    [] Rectal Tube [] Drain  [] Other:     Constitutional: Ill-appearing, ashen, nonverbal, eyes closed today  Eyes: Eyes are open but sunken  ENMT: Trach in place.     Cardiovascular: Regular rhythm  Respiratory: Breathing has slowed, minimal secretions, minimal air movement  Gastrointestinal: Soft, PEG in place  Musculoskeletal: Muscle wasting, emaciated  Skin: Poor skin turgor, warm  Neurologic: Nonverbal  Psychiatric: Calm  Other:       Pertinent Lab and or Imaging Tests:  Lab Results   Component Value Date/Time    Sodium 139 09/01/2019 05:19 PM    Potassium 5.3 (H) 09/01/2019 05:19 PM    Chloride 101 09/01/2019 05:19 PM    CO2 28 09/01/2019 05:19 PM    Anion gap 10 09/01/2019 05:19 PM    Glucose 122 (H) 09/01/2019 05:19 PM    BUN 53 (H) 09/01/2019 05:19 PM    Creatinine 2.12 (H) 09/01/2019 05:19 PM    BUN/Creatinine ratio 25 (H) 09/01/2019 05:19 PM    GFR est AA 37 (L) 09/01/2019 05:19 PM    GFR est non-AA 31 (L) 09/01/2019 05:19 PM    Calcium 8.6 09/01/2019 05:19 PM     Lab Results   Component Value Date/Time    Protein, total 7.4 09/01/2019 05:19 PM    Albumin 2.2 (L) 09/01/2019 05:19 PM           Total time:   Counseling / coordination time:   > 50% counseling / coordination?:

## 2019-09-13 NOTE — ROUTINE PROCESS
Bedside and Verbal shift change report given to Oneil8 HERNÁN Up (oncoming nurse) by Emmy BOO (offgoing nurse). Report included the following information SBAR, Kardex, Intake/Output and Recent Results.

## 2019-09-14 NOTE — HOSPICE
[facility]  [phone]    GIP Daily Nursing Note   Patient Name: Bimal Orta  YOB: 1945  Age: 69 y.o. Date of Visit: 09/14/19  Facility of Care: [facility]  Patient Room: [facility]/     Hospice Attending: Jeanette Andrade MD  Hospice Diagnosis: Sepsis [facility]) [A41.9]    Level of Care: GIP    Current GIP Symptoms    1. Shortness of breath  2. Excessive upper airway secretions with inability to clear  3. Restlessness  4. Blepharitis  5. Generalized pain as evidence by objective signs of facial grimacing        ASSESSMENT & PLAN   Must update Plan of Care including visit frequencies for IDT members  1. Continue with morphine 4 mg SC q2hrs scheduled; continue with morphine 4 mg SC [id number] prn as needed  2. Continue with robinul 0.2 mg SC q4hrs scheduled; scopolamine patch 1 mg q72hrs transdermal   3. Continue with scheduled lacrilube to bilateral eyes q12hrs  4. Continue with ativan 1 mg SC [id number] prn for terminal agitation  5. Hospice IDT to continue to coordinate care (discussed POC with bedside RN Danay)      Spiritual Interventions: hospice chaplain to continue to follow for spiritual support    Psych/ Social/ Emotional Interventions: hospice MSW to continue to provide support    Care Coordination Needs: hospice RN, hospice MSW, hospice chaplain, hospice MD, and unit nursing staff    Care plan and New Orders discussed / approved with Ulises Ceballos MD.    Description History and Chart Review   If this is initial GIP note must document RN assessment/MD communication in previous setting.  Specifically document nursing/medication needs in last 24 hours to support GIP care  Narrative History of last 24 hours that demonstrates care cannot be provided in another setting:  Patient still requiring scheduled opioids for generalized pain and scheduled anticholinergics for severe upper airway secretions; patient requiring frequent nursing and medical assessments for changing condition    What has been done to control the patient's symptoms in the last 24 hours? Continued with scheduled opioids and anticholinergics SC for GIP symptoms; continue with current regimen and no changes in medication necessary this morning    Does the patient currently require IV medications? Yes  Does the patient currently require scheduled medications? Yes  Does the patient currently require a PCA? No    List number of doses of PRN medications in last 24 hours:  Medication 1:  Number of doses:    Medication 2:   Number of doses:    Medication 3:   Number of doses:    Supporting documentation for GIP need for pain control:  [x] Frequent evaluation by a doctor, nurse practitioner, nurse   [x] Frequent medication adjustment    [x] IVs that cannot be administered at home   [x] Aggressive pain management   [x] Complicated technical delivery of medications              Supporting documentation for GIP need for symptom control:  [x]  Sudden decline necessitating intensive nursing intervention  [x]  Uncontrolled / intractable nausea or vomiting   []  Pathological fractures  [x]  Advanced open wounds requiring frequent skilled care  [x] Unmanageable respiratory distress  [] New or worsening delirium   [] Delirium with behavior issues: Is 24 hour caregiver present due to safety concerns with agitation? (yes/no)  [x] Imminent death  with skilled nursing needs documented above    DISCHARGE PLANNING   Daily discharge planning required for GIP  1. Discharge Plan: Return to retirement once patient's condition becomes stable; patient currently too imminent and unstable for transport  2. Patient/Family teaching: patient educated about symptom management and end of life care  3.  Response to patient/family teaching: patient very minimally responsive at this time and does not demonstrate any understanding    ASSESSMENT    KARNOFSKY: 10%    Prognosis estimated based on 09/14/19 clinical assessment is:   [x] Few to Many Hours  [] Hours to Days   [] Few to Many Days   [] Days to Weeks   [] Few to Many Weeks   [] Weeks to Months   [] Few to Many Months    Quality Measure: Patient self-reports:  [x] Yes    [] No    ESAS:   Time of Assessment: 08:00  Pain (1-10):3  Fatigue (1-10): 3  Shortness of breath (1-10):4   Nausea (1-10): Appetite (1-10):    Anxiety: (1-10):   Depression: (1-10):   Well-being: (1-10):   Constipation: _ Yes  _ No  LAST BM:     CLINICAL INFORMATION     Patient Vitals for the past 12 hrs:   Temp Pulse Resp BP SpO2   09/14/19 0729 97.5 °F (36.4 °C) 73 13 (!) 82/58 91 %       Currently this patient has:  [] Supplemental O2   [x] SC lines (bilateral abdomen)    [] PICC      [] PORT   [] NG Tube    [x] PEG Tube   [] Ostomy     [x] Cummings draining _______ urine  [] Other:     SIGNS/PHYSICAL FINDINGS     Skin (including wound):  [] Warm, dry, supple, intact and color normal for race  [] Warm   [] Dry   [x] Cool     [x] Clammy       [] Diaphoretic    Turgor   [] Normal   [x] Decreased  Color:   [] Pink   [x] Pale   [] Cyanotic   [] Erythema   [] Jaundice   [] Normal for Race  [x]  Wounds:    Neuro:  [] Lethargy  [] Restlessness / agitation  [] Confusion / delirium  [] Hallucinations  [x] Open eyes to verbal stimulation  [] Unresponsive  [] Seizures     Cardiac:  [] Dyspnea on Exertion  [] JVD  [] Murmur  [] Palpitations  [x] Hypotension  [] Hypertension  [] Tachycardia  [] Bradycardia  [] Irregular HR  [x] Pulses Decreased  [] Pulses Absent  [x] Edema:  Pedal edema +1   [] Mottling:      (Location)    Respiratory:  Breath sounds:    [x] Diminished   [] Wheeze   [] Rhonchi   [] Rales   [] Even and unlabored  [] Labored:            [] Cough   [] Non Productive   [] Productive    [] Description:           [] Deep suctioned   [] O2 at ___ LPM  [] High flow oxygen greater than 10 LPM  [] Bi-Pap    GI  [x] Abdomen (flat, sunken, hypoactive)   [] Ascites  [] Nausea  [] Vomiting  [] Incontinent of bowels  [x] Bowel sounds (Hypoactive)  [] Diarrhea  [] Constipation (see above including last bowel movement)  [] Checked for impaction  [] Last BM     Nutrition  Diet: NPO  Appetite:   [] Good   [] Fair   [x] Poor   [] Tube Feeding       [] Voiding  [] Incontinent   [x] Cummings    Musculoskeletal  [] Balance/[place] Unsteady   [x] Weak   Strength:    [] Normal    [] Limited    [x] Decreasing   Activities:    [] Up as tolerated   [x] Bedridden    [] Specify:    SAFETY  [x] 24 hr. Caregiver   [x] Side rails ?     [x] Hospital bed   [x] Reviewed Falls & Safety       ALLERGIES AND MEDICATIONS     Allergies: No Known Allergies    Current Facility-Administered Medications   Medication Dose Route Frequency    morphine injection 4 mg  4 mg IntraVENous Q2H    morphine injection 4 mg  4 mg IntraVENous Q15MIN PRN    acetaminophen (TYLENOL) suppository 650 mg  650 mg Rectal Q4H PRN    white petrolatum-mineral oil (LACRILUBE S.O.P.) ointment   Both Eyes Q12H    LORazepam (ATIVAN) injection 1 mg  1 mg IntraVENous Q15MIN PRN    ondansetron (ZOFRAN) injection 4 mg  4 mg IntraVENous Q4H PRN    scopolamine (TRANSDERM-SCOP) 1 mg over 3 days 1 Patch  1 Patch TransDERmal Q72H    glycopyrrolate (ROBINUL) injection 0.2 mg  0.2 mg IntraVENous Q4H    bisacodyl (DULCOLAX) suppository 10 mg  10 mg Rectal DAILY PRN          Visit Time In: 08:00  Visit Time Out: 09:00

## 2019-09-14 NOTE — PROGRESS NOTES
Rec'd report from [facility] at bedside. Pt has guards with ankle restraints. Pt is unresponsive. 0865 Report given to nightshift RN

## 2019-09-14 NOTE — ROUTINE PROCESS
Bedside and Verbal shift change report given to EMA Jon (oncoming nurse) by Elwood Osgood, RN (offgoing nurse). Report included the following information SBAR, Kardex, MAR and [employer].

## 2019-09-15 NOTE — PROGRESS NOTES
visited patient, Laura , for EOL support. Lemond  was lying in bed and appeared to be resting comfortably. Two guards were at the bedside.  stood by [employer] bedside and offered words of comfort and supportive presence.  will follow up as able and/or needed  [facility]. Payal Cocks.      Paging Service: [phone])

## 2019-09-15 NOTE — ROUTINE PROCESS
Bedside shift change report given to EMA Messina (oncoming nurse) by Bre Baxter  (offgoing nurse). Report included the following information SBAR and Kardex.

## 2019-09-15 NOTE — PROGRESS NOTES
Bedside and Verbal shift change report given to EMA Swain (oncoming nurse) by Xenia Gill (offgoing nurse). Report included the following information SBAR and Kardex.

## 2019-09-16 NOTE — HOSPICE
[address] [facility]  [facility]  [phone]     [facility] Daily Nursing Note   Patient Name: Sly Seo  YOB: 1945  Age: 69 y.o.     Date of Visit: 09/16/19  Facility of Care: [facility]  Patient Room: [facility]/     Hospice Attending: Suman Carr MD  Hospice Diagnosis: Sepsis ([address] [A41.9]     Level of Care: [facility]     Current GIP Symptoms    1. Signs of distress with eyes wide open and increased respiratory rate during and after movement of extremeties for assessment  Continue Morphine 4 mg IV q 2 hours scheduled           ASSESSMENT & PLAN   1.  above        Spiritual Interventions: Hospice chaplains visit regularly and are available 24 hours a day as needed        Psych/ Social/ Emotional Interventions: Daughter called and requested personal visit with her and other family members. [facility] to request permission from  for a visit. Left message for her to call back. Daughter needs to make travel plans from [place]. I offered to relay a verbal message to the patient on her behalf. This writer relayed verbal message to patient that his daughter loved him and was trying to get permission to see him in person.      Care Coordination Needs: See above plus daily updates with [facility] hospice interdisciplinary team.       Care plan and New Orders discussed / approved with Norman Lara MD.     Description History and Chart Review     Narrative History of last 24 hours that demonstrates care cannot be provided in another setting:  Patient is at end of life. There is a high likelihood of sudden decline   Requiring IV medications for symptom management at end of life      What has been done to control the patient's symptoms in the last 24 hours? Scopolamine patch   Glcyopyrrolate 0.2 mg IV q 4 hours   Morphine 4 mg IV q 2 hours      Does the patient currently require IV medications? YES  Does the patient currently require scheduled medications?  YES  Does the patient currently require a PCA? NO     List number of doses of PRN medications in last 24 hours:  Medication 1: Scopolomine patch  Number of doses: 1     Medication 2:   Number of doses:     Medication 3:   Number of doses:     Supporting documentation for GIP need for pain control:  [x] Frequent evaluation by a doctor, nurse practitioner, nurse   [] Frequent medication adjustment    [x] IVs that cannot be administered at home   [x] Aggressive pain management   [x] Complicated technical delivery of medications                                                                                                                                   Supporting documentation for GIP need for symptom control:  [x]  Sudden decline necessitating intensive nursing intervention  []  Uncontrolled / intractable nausea or vomiting   []  Pathological fractures  []  Advanced open wounds requiring frequent skilled care  [] Unmanageable respiratory distress  [] New or worsening delirium   [] Delirium with behavior issues: Is 24 hour caregiver present due to safety concerns with agitation? (yes/no)  [x] Imminent death  with skilled nursing needs documented above     DISCHARGE PLANNING   Daily discharge planning required for GIP  1. Discharge Plan: Education provided on symptoms at end of life yet to return to incarceration could be an option  2. Patient/Family teaching: No family at bedside. Have spoken to his daughter by telephone today. 3. Response to patient/family teaching: Receptive     ASSESSMENT    KARNOFSKY: 10     Quality Measure:       Patient self-reports:                 [x] No     ESAS:   Time of Assessment: 2100  Pain (1-10):3  Fatigue (1-10):   Shortness of breath (1-10): 2  Nausea (1-10): Appetite (1-10):    Anxiety: (1-10):   Depression: (1-10):   Well-being: (1-10):   Constipation: _ Yes  _ No  LAST BM:      CLINICAL INFORMATION   No data found.     Currently this patient has:  [x] Supplemental O2       Prn  [x] IV [] PICC                 [] PORT   [] NG Tube          [] PEG Tube        [] Ostomy     [x] Cummings draining scant amount of dark yellow concentrated urine  [] Other:      SIGNS/PHYSICAL FINDINGS      Skin (including wound):  [] Warm, dry, supple, intact and color normal for race  [x] Warm   [x] Dry   [] Cool     [] Clammy       [] Diaphoretic    Turgor              [] Normal              [x] Decreased  Color:              [] Pink              [x] Pale              [] Cyanotic              [] Erythema              [x] Jaundice              [] Normal for Race  []  Wounds:     Neuro:  Minimally responsive  [] Lethargy  [] Restlessness / agitation  [] Confusion / delirium  [] Hallucinations  [x] Responds to maximal stimulation  [] Unresponsive  [] Seizures      Cardiac:  [] Dyspnea on Exertion  [] JVD  [] Murmur  [] Palpitations  [] Hypotension  [] Hypertension  [] Tachycardia  [x] Bradycardia  [] Irregular HR  [] Pulses Decreased  [] Pulses Absent  [] Edema:       (Location, Grade and Pitting)  [] Mottling:      (Location)  Visit Vitals  /76 (BP 1 Location: Left arm, BP Patient Position: At rest)   Pulse (!) 56   Temp 97 °F (36.1 °C)   Resp 12   SpO2 91%         Respiratory:  Breath sounds:scattered secretions               [] Diminished              [] Wheeze              [] Rhonchi              [] Rales   [x] Even and unlabored  [] Labored:            [] Cough              [] Non Productive              [] Productive                          [] Description:           [] Deep suctioned    [x] O2 at __2_ LPM  prn  [] High flow oxygen greater than 10 LPM  [] Bi-Pap     GI  [x] Abdomen flat  No bowel sounds   Peg tube in place  [] Ascites  [] Nausea  [] Vomiting  [] Incontinent of bowels  [] Bowel sounds (yes/no)  [] Diarrhea  [] Constipation (see above including last bowel movement)  [] Checked for impaction  [] Last BM      Nutrition  Diet:__npo________  Appetite:   [] Good   [] Fair   [] Poor   [] Tube Feeding        [] Voiding  [] Incontinent   [x] Cummings     Musculoskeletal  [] Balance/[place] Unsteady   [] Weak   Strength:               [] Normal               [] Limited               [x] Decreasing   Activities:               [] Up as tolerated              [x] Bedridden               [] Specify:     SAFETY  [] 24 hr. Caregiver   [x] Side rails ?     [x] Hospital bed   [] Reviewed Falls & Safety         ALLERGIES AND MEDICATIONS      Allergies: No Known Allergies            Current Facility-Administered Medications   Medication Dose Route Frequency    morphine injection 4 mg  4 mg IntraVENous Q2H    morphine injection 4 mg  4 mg IntraVENous Q15MIN PRN    acetaminophen (TYLENOL) suppository 650 mg  650 mg Rectal Q4H PRN    white petrolatum-mineral oil (LACRILUBE S.O.P.) ointment   Both Eyes Q12H    LORazepam (ATIVAN) injection 1 mg  1 mg IntraVENous Q15MIN PRN    ondansetron (ZOFRAN) injection 4 mg  4 mg IntraVENous Q4H PRN    scopolamine (TRANSDERM-SCOP) 1 mg over 3 days 1 Patch  1 Patch TransDERmal Q72H    glycopyrrolate (ROBINUL) injection 0.2 mg  0.2 mg IntraVENous Q4H    bisacodyl (DULCOLAX) suppository 10 mg  10 mg Rectal DAILY PRN             Visit Time In: 1:45  Visit Time Out: 2:30

## 2019-09-16 NOTE — PROGRESS NOTES
[facility]  [phone]    Patient Name: Clarisa Correia  YOB: 1945    Date of Provider Hospice Visit: 09/16/19    Level of Care:   [x] General Inpatient (GIP)    [] Routine   [] Respite    Current Location of Care:  [] [facility] [x] [facility] [] [address] [] [facility] [] Hospice House [facility], patient referred from:  [] [facility] [] [facility] [] [address] [] [facility] [] Home [] Other:     Date of Original Hospice Admission: 9/2/2019  Hospice Medical Director at time of admission: [address] Diagnosis: Sepsis  Diagnoses RELATED to the terminal prognosis: Esophageal cancer, massive stroke, nonverbal, trach and PEG, severe protein malnutrition  Other Diagnoses:      HOSPICE SUMMARY     Clarisa Correia is a 69y.o. year old who was admitted to [facility]. Patient is a 62-year-old gentleman who has a [place] correctional inmate. Patient with a history of extensive stroke that has left him nonverbal and he also has required a PEG and a trach. He has a history of esophageal cancer which  unfortunately do not know a lot of the significant details. He presents to the emergency room with sepsis, source unknown. Patient hypotensive but with significant issues with secretions and shortness of breath. After discussion with his daughter via phone, agreement and transition to inpatient hospice care to focus on his comfort. Patient an inmate in the correctional facility. We had to obtain consents verbally via phone secondary to his daughter living in [place] and unable to travel to the hospital.  In addition, we would need permission from the correctional facility to allow visitation to the hospital.  After discussion with his daughter, she was not going to be able to travel to [place] secondary to her work schedule. She states she had seen him earlier this year and was okay with the focus to be comfort.     The patient's principle diagnosis has resulted in minimally responsive  Refer to LCD     Functionally, the patient's Karnofsky and/or Palliative Performance Scale has declined over a period of days to weeks and is estimated at 10. The patient is dependent on the following ADLs: All    Objective information that support this patients limited prognosis includes:  Fever to 105  Albumin of 2.2   chest x-ray with diffuse patchy infiltrates edema versus infection-      The patient/family chose comfort measures with the support of Hospice. HOSPICE DIAGNOSES   Active Symptoms:  1. Shortness of breath  2. Secretions  3. Restlessness  4. Blepharitis  5. Pain: non verbal     PLAN      1.  and SW to support family needs  2. Continue [facility] level care. Patient still needing frequent nursing assessment and IV medication for management. This clinical scenario frankly difficult to fully explain. Patient now without food/liquids for 2 weeks. Had been admitted with sepsis and was hypotensive/minimally responsive at time of admission. Patient had been doing poorly prior to admission and was in the infirmary at the Correctional facility so once again, patient with very little reserve with CVA and esophageal cancer but yet he has survived 2 weeks. 3. Continue morphine 4 mg SC every 2 hours and every 15 minutes as needed. We will continue to monitor his needs. Do not feel adjustments needed at this time. No prn medications needed  4. Scopolamine patch and Robinul 0.2 milligrams SC every 4 hours scheduled for secretions  5. Continue  eyedrops for comfort secondary to his blepharitis. 6. Continue  Toradol for PRN fever. 7. Continue comfort medications as needed for other symptoms to include fever, nausea, agitation  8. Plan reviewed with Hospice nurse, Neeta Lewis. Have felt like he has been to unstable to move and Correctional facility would not be able to manage current symptoms with medications we are using.    9. Disposition: still think he likely will pass in the hospital    Prognosis estimated based on 09/16/19 clinical assessment is:   [x] Hours to Days    [] Days to Weeks    [] Other:    Communicated plan of care with: Hospice Case Manager; Hospice IDT; Care Team     GOALS OF CARE     Patient/Medical POA stated Goal of Care: Hospice care    [x] I have reviewed and/or updated ACP information in the Advance Care Planning Navigator. This information is available in the [address] link in the patient's chart header. Primary Decision Maker (Health Care Agent):     Resuscitation Status: DNR  If DNR is there a Durable DNR on file? : [x] Yes [] No (If no, complete Durable DNR)    HISTORY     History obtained from: Chart, daughter, bedside nurse    CHIEF COMPLAINT: Fever  The patient is:   [] Verbal  [x] Nonverbal  [] Unresponsive    HPI/SUBJECTIVE: Patient is nonverbal.  His eyes are open but unable to follow commands or respond. Still with facial frowning and grimacing. Pt did have a bowel movement yesterday night and also urine output slightly better: 400cc this last shift  Overnight received all scheduled robinul and morphine    9/9patient much more awake but not alert. Grabbing my hand at times but it almost seems involuntary. Occasional facial grimacing    9/10less grimacing today. Not as awake. 9/11-appears comfortable right now    9/12 patient much more somnolent today. Eyes are not open. 9/13patient clearly more ashen today. Eyes barely open.     9/16-patient not responsive with eyes closed most of the visit       REVIEW OF SYSTEMS     The following systems were: [] reviewed  [x] unable to be reviewed    Adult Non-Verbal Pain Assessment Score 1    Face  [] 0   No particular expression or smile  [x] 1   Occasional grimace, tearing, frowning, wrinkled forehead  [] 2   Frequent grimace, tearing, frowning, wrinkled forehead    Activity (movement)  [x] 0   Lying quietly, normal position  [] 1   Seeking attention through movement or slow, cautious movement  [] 2   Restless, excessive activity and/or withdrawal reflexes    Guarding  [x] 0   Lying quietly, no positioning of hands over areas of body  [] 1   Splinting areas of the body, tense  [] 2   Rigid, stiff    Physiology (vital signs)  [x] 0   Stable vital signs  [] 1   Change in any of the following: SBP > 20mm Hg; HR > 20/minute  [] 2   Change in any of the following: SBP > 30mm Hg; HR > 25/minute    Respiratory  [x] 0   Baseline RR/SpO2, compliant with ventilator  [] 1   RR > 10 above baseline, or 5% drop SpO2, mild asynchrony with ventilator  [] 2   RR > 20 above baseline, or 10% drop SpO2, asynchrony with ventilator     FUNCTIONAL ASSESSMENT     Palliative Performance Scale (PPS): 10       PSYCHOSOCIAL/SPIRITUAL ASSESSMENT     Active Problems:    [facility]eps[facility] ([facility] (9/1/2019)      Past Medical History:   Diagnosis Date    CHF (congestive heart failure) ([facility] 9/1/2019    Esophageal cancer ([facility] 9/1/2019    H/O tracheostomy 9/1/2019    History of completed stroke 9/1/2019      No past surgical history on file.    Social History     Tobacco Use    Smoking status: Unknown If Ever Smoked   Substance Use Topics    Alcohol use: Not Currently     Family History   Family history unknown: Yes      No Known Allergies   Current Facility-Administered Medications   Medication Dose Route Frequency    morphine injection 4 mg  4 mg IntraVENous Q2H    morphine injection 4 mg  4 mg IntraVENous Q15MIN PRN    acetaminophen (TYLENOL) suppository 650 mg  650 mg Rectal Q4H PRN    white petrolatum-mineral oil (LACRILUBE S.O.P.) ointment   Both Eyes Q12H    LORazepam (ATIVAN) injection 1 mg  1 mg IntraVENous Q15MIN PRN    ondansetron (ZOFRAN) injection 4 mg  4 mg IntraVENous Q4H PRN    scopolamine (TRANSDERM-SCOP) 1 mg over 3 days 1 Patch  1 Patch TransDERmal Q72H    glycopyrrolate (ROBINUL) injection 0.2 mg  0.2 mg IntraVENous Q4H    bisacodyl (DULCOLAX) suppository 10 mg  10 mg Rectal DAILY PRN        PHYSICAL EXAM Wt Readings from Last 3 Encounters:   09/01/19 145 lb (65.8 kg)   07/30/19 145 lb (65.8 kg)   04/03/19 145 lb (65.8 kg)       Visit Vitals  BP (P) 100/60 (BP 1 Location: Left arm, BP Patient Position: At rest)   Pulse (!) 56   Temp 97.1 °F (36.2 °C)   Resp 13   SpO2 91%       Supplemental O2  [x] Yes  [] NO  Last bowel movement:     Currently this patient has:  [x] Peripheral IV [] PICC  [] PORT [] ICD    [x] Cummings Catheter [] NG Tube   [x] PEG Tube    [] Rectal Tube [] Drain  [] Other:     Constitutional: Ill-appearing, ashen, nonverbal, eyes closed   Eyes: Eyes are open but sunken  ENMT: Trach in place.     Cardiovascular: Regular rhythm  Respiratory: Breathing has slowed, minimal secretions, minimal air movement  Gastrointestinal: Soft, PEG in place  Musculoskeletal: Muscle wasting, emaciated  Skin: Poor skin turgor, warm  Neurologic: Nonverbal  Psychiatric: Calm  Other:       Pertinent Lab and or Imaging Tests:  Lab Results   Component Value Date/Time    Sodium 139 09/01/2019 05:19 PM    Potassium 5.3 (H) 09/01/2019 05:19 PM    Chloride 101 09/01/2019 05:19 PM    CO2 28 09/01/2019 05:19 PM    Anion gap 10 09/01/2019 05:19 PM    Glucose 122 (H) 09/01/2019 05:19 PM    BUN 53 (H) 09/01/2019 05:19 PM    Creatinine 2.12 (H) 09/01/2019 05:19 PM    BUN/Creatinine ratio 25 (H) 09/01/2019 05:19 PM    GFR est AA 37 (L) 09/01/2019 05:19 PM    GFR est non-AA 31 (L) 09/01/2019 05:19 PM    Calcium 8.6 09/01/2019 05:19 PM     Lab Results   Component Value Date/Time    Protein, total 7.4 09/01/2019 05:19 PM    Albumin 2.2 (L) 09/01/2019 05:19 PM           Total time:   Counseling / coordination time:   > 50% counseling / coordination?:

## 2019-09-16 NOTE — HOSPICE
Routine spiritual care visit with the patient. 2 officers standing guard in his room. The patient was staring at the wall and appeared the same as he did for the initial visit thew week prior. The patient would blink his eyes when the  talked to him but made no other movements nor did he track with his eyes. The  talked to the patient about his strength and his sister wanting to make contact. The  wished the patient well. The  has been unable to reach the patient's sister but she has been in contact with other hospice team members.

## 2019-09-16 NOTE — ROUTINE PROCESS
Bedside shift change report given to [facility] BLADE (oncoming nurse) by Bety Flores (offgoing nurse). Report included the following information SBAR and Kardex.

## 2019-09-16 NOTE — HOSPICE
[address] [facility]  [phone]    GIP Daily Nursing Note   Patient Name: Stephanie Miller  YOB: 1945  Age: 69 y.o. Date of Visit: 09/15/19  Facility of Care: [facility]  Patient Room: [facility]/     Hospice Attending: Deann Kim MD  Hospice Diagnosis: Sepsis [facility]) [A41.9]    Level of Care: GIP    Current GIP Symptoms    1. Signs of distress with eyes wide open and increased respiratory rate during and after movement of extremeties for assessment  Continue Morphine 4 mg IV q 2 hours scheduled        ASSESSMENT & PLAN   Must update Plan of Care including visit frequencies for IDT members  1.  above      Spiritual Interventions: Hospice chaplains visit regularly and are available 24 hours a day as needed      Psych/ Social/ Emotional Interventions:     Care Coordination Needs: Will send report to [facility] hospice interdisciplinary team.      Care plan and New Orders discussed / approved with Mati Cross MD.    Description History and Chart Review   If this is initial GIP note must document RN assessment/MD communication in previous setting. Specifically document nursing/medication needs in last 24 hours to support GIP care  Narrative History of last 24 hours that demonstrates care cannot be provided in another setting:  Patient is at end of life. There is a high likelihood of sudden decline   Requiring IV medications for symptom management at end of life     What has been done to control the patient's symptoms in the last 24 hours? Scopolamine patch   Glcyopyrrolate 0.2 mg IV q 4 hours   Morphine 4 mg IV q 2 hours     Does the patient currently require IV medications? YES  Does the patient currently require scheduled medications? YES  Does the patient currently require a PCA?  NO    List number of doses of PRN medications in last 24 hours:  Medication 1:  Number of doses:    Medication 2:   Number of doses:    Medication 3:   Number of doses:    Supporting documentation for GIP need for pain control:  [x] Frequent evaluation by a doctor, nurse practitioner, nurse   [] Frequent medication adjustment    [x] IVs that cannot be administered at home   [x] Aggressive pain management   [x] Complicated technical delivery of medications              Supporting documentation for GIP need for symptom control:  [x]  Sudden decline necessitating intensive nursing intervention  []  Uncontrolled / intractable nausea or vomiting   []  Pathological fractures  []  Advanced open wounds requiring frequent skilled care  [] Unmanageable respiratory distress  [] New or worsening delirium   [] Delirium with behavior issues: Is 24 hour caregiver present due to safety concerns with agitation? (yes/no)  [x] Imminent death  with skilled nursing needs documented above    DISCHARGE PLANNING   Daily discharge planning required for GIP  1. Discharge Plan: Education provided on symptoms at end of life yet to return to incarceration could be an option  2. Patient/Family teaching: not present  3. Response to patient/family teaching:     ASSESSMENT    KARNOFSKY: 10    Quality Measure: Patient self-reports:      [x] No    ESAS:   Time of Assessment: 2100  Pain (1-10):3  Fatigue (1-10):   Shortness of breath (1-10): 2  Nausea (1-10): Appetite (1-10): Anxiety: (1-10):   Depression: (1-10):   Well-being: (1-10):   Constipation: _ Yes  _ No  LAST BM:     CLINICAL INFORMATION   No data found.     Currently this patient has:  [x] Supplemental O2 Prn  [x] IV    [] PICC      [] PORT   [] NG Tube    [] PEG Tube   [] Ostomy     [] Cummings draining _______ urine  [] Other:     SIGNS/PHYSICAL FINDINGS     Skin (including wound):  [] Warm, dry, supple, intact and color normal for race  [x] Warm   [x] Dry   [] Cool     [] Clammy       [] Diaphoretic    Turgor   [] Normal   [x] Decreased  Color:   [] Pink   [x] Pale   [] Cyanotic   [] Erythema   [x] Jaundice   [] Normal for Race  []  Wounds:    Neuro:  Minimally responsive  [] Lethargy  [] Restlessness / agitation  [] Confusion / delirium  [] Hallucinations  [] Responds to maximal stimulation  [] Unresponsive  [] Seizures     Cardiac:  [] Dyspnea on Exertion  [] JVD  [] Murmur  [] Palpitations  [] Hypotension  [] Hypertension  [] Tachycardia  [x] Bradycardia  [] Irregular HR  [] Pulses Decreased  [] Pulses Absent  [] Edema:       (Location, Grade and Pitting)  [] Mottling:      (Location)  Visit Vitals  /76 (BP 1 Location: Left arm, BP Patient Position: At rest)   Pulse (!) 56   Temp 97 °F (36.1 °C)   Resp 12   SpO2 91%       Respiratory:  Breath sounds:scattered secretions    [] Diminished   [] Wheeze   [] Rhonchi   [] Rales   [] Even and unlabored  [] Labored:            [] Cough   [] Non Productive   [] Productive    [] Description:           [] Deep suctioned    [x] O2 at __2_ LPM  prn  [] High flow oxygen greater than 10 LPM  [] Bi-Pap    GI  [x] Abdomen flat  No bowel sounds   Peg tube in place  [] Ascites  [] Nausea  [] Vomiting  [] Incontinent of bowels  [] Bowel sounds (yes/no)  [] Diarrhea  [] Constipation (see above including last bowel movement)  [] Checked for impaction  [] Last BM     Nutrition  Diet:__npo________  Appetite:   [] Good   [] Fair   [] Poor   [] Tube Feeding       [] Voiding  [] Incontinent   [] Cummings    Musculoskeletal  [] Balance/[place] Unsteady   [] Weak   Strength:    [] Normal    [] Limited    [x] Decreasing   Activities:    [] Up as tolerated   [x] Bedridden    [] Specify:    SAFETY  [] 24 hr. Caregiver   [x] Side rails ?     [x] Hospital bed   [] Reviewed Falls & Safety       ALLERGIES AND MEDICATIONS     Allergies: No Known Allergies    Current Facility-Administered Medications   Medication Dose Route Frequency    morphine injection 4 mg  4 mg IntraVENous Q2H    morphine injection 4 mg  4 mg IntraVENous Q15MIN PRN    acetaminophen (TYLENOL) suppository 650 mg  650 mg Rectal Q4H PRN    white petrolatum-mineral oil (LACRILUBE S.O.P.) ointment Both Eyes Q12H    LORazepam (ATIVAN) injection 1 mg  1 mg IntraVENous Q15MIN PRN    ondansetron (ZOFRAN) injection 4 mg  4 mg IntraVENous Q4H PRN    scopolamine (TRANSDERM-SCOP) 1 mg over 3 days 1 Patch  1 Patch TransDERmal Q72H    glycopyrrolate (ROBINUL) injection 0.2 mg  0.2 mg IntraVENous Q4H    bisacodyl (DULCOLAX) suppository 10 mg  10 mg Rectal DAILY PRN          Visit Time In: 2030  Visit Time Out: 2140

## 2019-09-16 NOTE — HOSPICE
Received voice mail message from Antonio Galvez from [place] who states she received my request for the personal visits by family for Mr. Murillo. She will forward the request to the warden and get back to me once she receives the warden's response. She noted that she would likely be calling tomorrow given the time necessary for the facility to verify the names and relationships of the next of kin requesting the visit. Thank you for the opportunity to serve this patient and his family.

## 2019-09-17 NOTE — HOSPICE
Call from Madalyn Quinones [place] liaison regarding visitation for Mr. Murillo's family. Permission granted from [address] [place] [address] for 1 time  ONLY visit with 5 family members( listed below)  Stipulations as follows:  Family will have to go into room in 2 separate groups: 1 group of 3 and 1 group of 2. Each group will have 1 hour to visit with patient. Liaison to meet family at main entrance of hospital to escort family to patient's room. Visitors as follows:  Citlaly Carlos ( daughter)  Smiin Krishna (brother)  Sajan Bowden (sister)  Lianna Overton (sister)  Garo Neves ( brother)    Hoa Estrada states that she and her family will be here on Thursday 09/19/19 from 11am-1pm. Will bring ID if needed.   Ms. Edwards Desiree updated on date and time of visit, she will update

## 2019-09-17 NOTE — HOSPICE
[address] [place] [address]  Good Help to Those in Need  [phone]     [facility] Daily Nursing Note   Patient Name: Messi Murillo  Date of Birth: 1945  Age: 74 y.o.     Date of Visit: 09/17/19  Facility of [address] [place] [address]  Patient Room: [facility]     Hospice Attending: Kathleen Haile MD  Hospice Diagnosis: Sepsis [facility]) [A41.9]     Level of Care: GIP     Current GIP Symptoms    1. Signs of distress with eyes wide open, hypotensive, bradycardic  2. increased respiratory rate during and after movement of extremeties for assessment   3. Continue Morphine 4 mg IV q 2 hours scheduled           ASSESSMENT & PLAN   1.  Continue current plan of care for symptom management  2. Monitor for any changes  3. Continue to update [employer] liaison daily  4. Remain GIP LOC         Spiritual Interventions: Hospice chaplains visit regularly and are available 24 hours a day as needed        Psych/ Social/ Emotional Interventions: Call to Suman Woodall - updated on POC, awaiting permission from from [address] [place] [address] for a visit. Daughter will need to make travel arrangements if visit is approved      Care Coordination Needs: See above plus daily updates with [facility] hospice interdisciplinary team.       Care plan and New Orders discussed / approved with Kam Haile MD.     Description History and Chart Review      Narrative History of last 24 hours that demonstrates care cannot be provided in another setting:  Patient is at end of life.   There is a high likelihood of sudden decline   Requiring IV medications for symptom management at end of life      What has been done to control the patient's symptoms in the last 24 hours?   Scopolamine patch   Glcyopyrrolate 0.2 mg IV q 4 hours   Morphine 4 mg IV q 2 hours      Does the patient currently require IV medications? YES  Does the patient currently require scheduled medications? YES  Does the patient currently require a PCA? NO     List number of doses of PRN medications in last 24 hours:  Medication 1: Scopolomine patch  Number of doses: 1     Medication 2:   Number of doses:     Medication 3:   Number of doses:     Supporting documentation for GIP need for pain control:  [x] Frequent evaluation by a doctor, nurse practitioner, nurse   [] Frequent medication adjustment    [x] IVs that cannot be administered at home   [x] Aggressive pain management   [x] Complicated technical delivery of medications                                                                                                                                   Supporting documentation for GIP need for symptom control:  [x]  Sudden decline necessitating intensive nursing intervention  []  Uncontrolled / intractable nausea or vomiting   []  Pathological fractures  []  Advanced open wounds requiring frequent skilled care  [] Unmanageable respiratory distress  [] New or worsening delirium   [] Delirium with behavior issues: Is 24 hour caregiver present due to safety concerns with agitation? (yes/no)  [x] Imminent death  with skilled nursing needs documented above     DISCHARGE PLANNING   Daily discharge planning required for GIP  1. Discharge Plan: Education provided on symptoms at end of life yet to return to incarceration could be an option  2. Patient/Family teaching: No family at bedside. Have spoken to his daughter by telephone today. 3. Response to patient/family teaching: Receptive     ASSESSMENT    KARNOFSKY: 10     Quality Measure:       Patient self-reports:                 [x] No     ESAS:    Time of Assessment:   Pain (1-10):3  Fatigue (1-10):   Shortness of breath (1-10): 2  Nausea (1-10): Appetite (1-10):    Anxiety: (1-10):   Depression: (1-10):   Well-being: (1-10):   Constipation: _ Yes  _ No  LAST BM: 09/16/19     CLINICAL INFORMATION   No data found.     Currently this patient has:  [x] Supplemental O2       Prn  [x] IV                      [] PICC                 [] PORT   [] NG Tube          [] PEG Tube        [] Ostomy     [x] Cummings draining scant amount of dark yellow concentrated urine  [] Other:      SIGNS/PHYSICAL FINDINGS      Skin (including wound):  [] Warm, dry, supple, intact and color normal for race  [x] Warm   [x] Dry   [] Cool     [] Clammy       [] Diaphoretic    Turgor              [] Normal              [x] Decreased  Color:              [] Pink              [x] Pale              [] Cyanotic              [] Erythema              [x] Jaundice              [] Normal for Race  []  Wounds:     Neuro:  Minimally responsive  [] Lethargy  [] Restlessness / agitation  [] Confusion / delirium  [] Hallucinations  [x] Responds to maximal stimulation  [] Unresponsive  [] Seizures      Cardiac:  [] Dyspnea on Exertion  [] JVD  [] Murmur  [] Palpitations  [] Hypotension  [] Hypertension  [] Tachycardia  [x] Bradycardia  [] Irregular HR  [] Pulses Decreased  [] Pulses Absent  [] Edema:       (Location, Grade and Pitting)  [] Mottling:      (Location)  Visit Vitals  BP 85/55   Pulse (!) 53   Temp 97 °F (36.1 °C)   Resp 10   SpO2 90%         Respiratory:  Breath sounds:scattered secretions               [x] Diminished              [] Wheeze              [] Rhonchi              [] Rales   [x] Even and unlabored  [] Labored:            [] Cough              [] Non Productive              [] Productive                          [] Description:           [] Deep suctioned    [x] O2 at __2_ LPM  prn  [] High flow oxygen greater than 10 LPM  [] Bi-Pap     GI  [x] Abdomen flat  No bowel sounds   Peg tube in place  [] Ascites  [] Nausea  [] Vomiting  [] Incontinent of bowels  [] Bowel sounds (yes/no)  [] Diarrhea  [] Constipation (see above including last bowel movement)  [] Checked for impaction  [] Last BM      Nutrition  Diet:__npo________  Appetite:   [] Good   [] Fair   [] Poor   [] Tube Feeding        [] Voiding  [] Incontinent   [x] Cummings     Musculoskeletal  [] Balance/[place] Unsteady   [] Weak   Strength:               [] Normal             [] Limited               [x] Decreasing   Activities:               [] Up as tolerated              [x] Bedridden               [] Specify:     SAFETY  [] 24 hr. Caregiver   [x] Side rails ?    [x] Hospital bed   [] Reviewed Falls & Safety         ALLERGIES AND MEDICATIONS      Allergies: No Known Allergies                 Current Facility-Administered Medications   Medication Dose Route Frequency    morphine injection 4 mg  4 mg IntraVENous Q2H    morphine injection 4 mg  4 mg IntraVENous Q15MIN PRN    acetaminophen (TYLENOL) suppository 650 mg  650 mg Rectal Q4H PRN    white petrolatum-mineral oil (LACRILUBE S.O.P.) ointment   Both Eyes Q12H    LORazepam (ATIVAN) injection 1 mg  1 mg IntraVENous Q15MIN PRN    ondansetron (ZOFRAN) injection 4 mg  4 mg IntraVENous Q4H PRN    scopolamine (TRANSDERM-SCOP) 1 mg over 3 days 1 Patch  1 Patch TransDERmal Q72H    glycopyrrolate (ROBINUL) injection 0.2 mg  0.2 mg IntraVENous Q4H    bisacodyl (DULCOLAX) suppository 10 mg  10 mg Rectal DAILY PRN             Visit Time In: 10:00  Visit Time Out: 10:30

## 2019-09-17 NOTE — PROGRESS NOTES
Bedside and Verbal shift change report given to Mateus Romero (oncoming nurse) by Riya Gusman (offgoing nurse). Report included the following information SBAR, Kardex, Intake/Output, MAR and Recent Results.

## 2019-09-17 NOTE — PROGRESS NOTES
[facility] Help to Those in Need  [phone]    Patient Name: Ruslan Marcus  YOB: 1945    Date of Provider Hospice Visit: 09/17/19    Level of Care:   [x] General Inpatient (GIP)    [] Routine   [] Respite    Current Location of Care:  [] [facility] [x] [address] [] [facility] [] [facility] [] Hospice House [facility], patient referred from:  [] [facility] [] [address] [] [facility] [] [facility] [] Home [] Other:     Date of Original Hospice Admission: 9/2/2019  Hospice Medical Director at time of admission: [address] Diagnosis: Sepsis  Diagnoses RELATED to the terminal prognosis: Esophageal cancer, massive stroke, nonverbal, trach and PEG, severe protein malnutrition  Other Diagnoses:      HOSPICE SUMMARY     Ruslan Marcus is a 69y.o. year old who was admitted to [facility]. Patient is a 62-year-old gentleman who has a [place] correctional inmate. Patient with a history of extensive stroke that has left him nonverbal and he also has required a PEG and a trach. He has a history of esophageal cancer which  unfortunately do not know a lot of the significant details. He presents to the emergency room with sepsis, source unknown. Patient hypotensive but with significant issues with secretions and shortness of breath. After discussion with his daughter via phone, agreement and transition to inpatient hospice care to focus on his comfort. Patient an inmate in the correctional facility. We had to obtain consents verbally via phone secondary to his daughter living in [place] and unable to travel to the hospital.  In addition, we would need permission from the correctional facility to allow visitation to the hospital.  After discussion with his daughter, she was not going to be able to travel to [place] secondary to her work schedule. She states she had seen him earlier this year and was okay with the focus to be comfort.     The patient's principle diagnosis has resulted in minimally responsive  Refer to LCD     Functionally, the patient's Karnofsky and/or Palliative Performance Scale has declined over a period of days to weeks and is estimated at 10. The patient is dependent on the following ADLs: All    Objective information that support this patients limited prognosis includes:  Fever to 105  Albumin of 2.2   chest x-ray with diffuse patchy infiltrates edema versus infection-      The patient/family chose comfort measures with the support of Hospice. HOSPICE DIAGNOSES   Active Symptoms:  1. Shortness of breath  2. Secretions  3. Restlessness  4. Blepharitis  5. Pain: non verbal     PLAN      1.  and SW to support family needs  2. Continue [facility] level care. Patient still needing frequent nursing assessment and IV medication for management. He continues to show decline but this is been much slower than anticipated. As a reminder, this patient was admitted to [facility] care, he had a blood pressure in the 60s and was showing evidence of septic shock. Somehow(no medical explanation) he has been able to survive for 2 weeks without nutrition/fluids. He was very sick even prior to admission [place]. At this point, I just do not feel comfortable discharging him back to the correctional facility with a significant decline in the management that were having to do with IV/subcu medication. 3. Continue morphine 4 mg SC every 2 hours and every 15 minutes as needed. We will continue to monitor his needs. Do not feel adjustments needed at this time. No prn medications needed  4. Scopolamine patch and Robinul 0.2 milligrams SC every 4 hours scheduled for secretions  5. Continue  eyedrops for comfort secondary to his blepharitis. 6. Continue  Toradol for PRN fever. 7. Continue comfort medications as needed for other symptoms to include fever, nausea, agitation  8. Plan reviewed with Hospice nurse, Jp Quarles.  Have felt like he has been to unstable to move and Correctional facility would not be able to manage current symptoms with medications we are using. 9. Disposition: still think he likely will pass in the hospital    Prognosis estimated based on 09/17/19 clinical assessment is:   [x] Hours to Days    [] Days to Weeks    [] Other:    Communicated plan of care with: Hospice Case Manager; Hospice IDT; Care Team     GOALS OF CARE     Patient/Medical POA stated Goal of Care: Hospice care    [x] I have reviewed and/or updated ACP information in the Advance Care Planning Navigator. This information is available in the [address] link in the patient's chart header. Primary Decision Maker (Health Care Agent):     Resuscitation Status: DNR  If DNR is there a Durable DNR on file? : [x] Yes [] No (If no, complete Durable DNR)    HISTORY     History obtained from: Chart, daughter, bedside nurse    CHIEF COMPLAINT: Fever  The patient is:   [] Verbal  [x] Nonverbal  [] Unresponsive    HPI/SUBJECTIVE: Patient is nonverbal.  His eyes are open but unable to follow commands or respond. Still with facial frowning and grimacing. Pt did have a bowel movement yesterday night and also urine output slightly better: 400cc this last shift  Overnight received all scheduled robinul and morphine    9/9patient much more awake but not alert. Grabbing my hand at times but it almost seems involuntary. Occasional facial grimacing    9/10less grimacing today. Not as awake. 9/11-appears comfortable right now    9/12 patient much more somnolent today. Eyes are not open. 9/13patient clearly more ashen today. Eyes barely open. 9/16-patient not responsive with eyes closed most of the visit    9/17 patient appears comfortable today.   Eyes slightly open       REVIEW OF SYSTEMS     The following systems were: [] reviewed  [x] unable to be reviewed    Adult Non-Verbal Pain Assessment Score 1    Face  [] 0   No particular expression or smile  [x] 1   Occasional grimace, tearing, frowning, wrinkled forehead  [] 2   Frequent grimace, tearing, frowning, wrinkled forehead    Activity (movement)  [x] 0   Lying quietly, normal position  [] 1   Seeking attention through movement or slow, cautious movement  [] 2   Restless, excessive activity and/or withdrawal reflexes    Guarding  [x] 0   Lying quietly, no positioning of hands over areas of body  [] 1   Splinting areas of the body, tense  [] 2   Rigid, stiff    Physiology (vital signs)  [x] 0   Stable vital signs  [] 1   Change in any of the following: SBP > 20mm Hg; HR > 20/minute  [] 2   Change in any of the following: SBP > 30mm Hg; HR > 25/minute    Respiratory  [x] 0   Baseline RR/SpO2, compliant with ventilator  [] 1   RR > 10 above baseline, or 5% drop SpO2, mild asynchrony with ventilator  [] 2   RR > 20 above baseline, or 10% drop SpO2, asynchrony with ventilator     FUNCTIONAL ASSESSMENT     Palliative Performance Scale (PPS): 10       PSYCHOSOCIAL/SPIRITUAL ASSESSMENT     Active Problems:    Sepsis ([facility] (9/1/2019)      Past Medical History:   Diagnosis Date    [facility]    Esophageal cancer ([facility] 9/1/2019    H/O tracheostomy 9/1/2019    History of completed stroke 9/1/2019      No past surgical history on file.    Social History     Tobacco Use    Smoking status: Unknown If Ever Smoked   Substance Use Topics    Alcohol use: Not Currently     Family History   Family history unknown: Yes      No Known Allergies   Current Facility-Administered Medications   Medication Dose Route Frequency    morphine injection 4 mg  4 mg IntraVENous Q2H    morphine injection 4 mg  4 mg IntraVENous Q15MIN PRN    acetaminophen (TYLENOL) suppository 650 mg  650 mg Rectal Q4H PRN    white petrolatum-mineral oil (LACRILUBE S.O.P.) ointment   Both Eyes Q12H    LORazepam (ATIVAN) injection 1 mg  1 mg IntraVENous Q15MIN PRN    ondansetron (ZOFRAN) injection 4 mg  4 mg IntraVENous Q4H PRN    scopolamine (TRANSDERM-SCOP) 1 mg over 3 days 1 Patch  1 Patch TransDERmal Q72H    glycopyrrolate (ROBINUL) injection 0.2 mg  0.2 mg IntraVENous Q4H    bisacodyl (DULCOLAX) suppository 10 mg  10 mg Rectal DAILY PRN        PHYSICAL EXAM     Wt Readings from Last 3 Encounters:   09/17/19 145 lb (65.8 kg)   09/01/19 145 lb (65.8 kg)   07/30/19 145 lb (65.8 kg)       Visit Vitals  BP (!) 85/55 (BP 1 Location: Left arm, BP Patient Position: At rest;Supine)   Pulse (!) 53   Temp 97 °F (36.1 °C)   Resp 12   Wt 145 lb (65.8 kg)   SpO2 90%   BMI 23.40 kg/m²       Supplemental O2  [x] Yes  [] NO  Last bowel movement:     Currently this patient has:  [x] Peripheral IV [] PICC  [] PORT [] ICD    [x] Cummings Catheter [] NG Tube   [x] PEG Tube    [] Rectal Tube [] Drain  [] Other:     Constitutional: Ill-appearing, ashen, nonverbal, eyes slightly open  Eyes: Eyes are open but sunken  ENMT: Trach in place.     Cardiovascular: Regular rhythm  Respiratory: Breathing has slowed, minimal secretions, minimal air movement  Gastrointestinal: Soft, PEG in place  Musculoskeletal: Muscle wasting, emaciated  Skin: Poor skin turgor, cool extremities  Neurologic: Nonverbal  Psychiatric: Calm  Other:       Pertinent Lab and or Imaging Tests:  Lab Results   Component Value Date/Time    Sodium 139 09/01/2019 05:19 PM    Potassium 5.3 (H) 09/01/2019 05:19 PM    Chloride 101 09/01/2019 05:19 PM    CO2 28 09/01/2019 05:19 PM    Anion gap 10 09/01/2019 05:19 PM    Glucose 122 (H) 09/01/2019 05:19 PM    BUN 53 (H) 09/01/2019 05:19 PM    Creatinine 2.12 (H) 09/01/2019 05:19 PM    BUN/Creatinine ratio 25 (H) 09/01/2019 05:19 PM    GFR est AA 37 (L) 09/01/2019 05:19 PM    GFR est non-AA 31 (L) 09/01/2019 05:19 PM    Calcium 8.6 09/01/2019 05:19 PM     Lab Results   Component Value Date/Time    Protein, total 7.4 09/01/2019 05:19 PM    Albumin 2.2 (L) 09/01/2019 05:19 PM           Total time:   Counseling / coordination time:   > 50% counseling / coordination?:

## 2019-09-17 NOTE — PROGRESS NOTES
Bedside shift change report given to Alvarez Montano (oncoming nurse) by Naty Dose (offgoing nurse). Report included the following information SBAR, Kardex, MAR and Recent Results.

## 2019-09-17 NOTE — PROGRESS NOTES
Problem: Falls - Risk of  Goal: *Absence of Falls  Description  Document Tomy Viramontes Fall Risk and appropriate interventions in the flowsheet. Outcome: Progressing Towards Goal  Note:   Fall Risk Interventions:  Mobility Interventions: Communicate number of staff needed for ambulation/transfer, Patient to call before getting OOB, PT Consult for mobility concerns, Strengthening exercises (ROM-active/passive)    Mentation Interventions: Adequate sleep, hydration, pain control, Bed/chair exit alarm, Evaluate medications/consider consulting pharmacy, Familiar objects from home, Family/sitter at bedside, More frequent rounding, Room close to nurse's station, Update white board    Medication Interventions: Evaluate medications/consider consulting pharmacy    Elimination Interventions: Call light in reach, Stay With Me (per policy), Toileting schedule/hourly rounds, Urinal in reach    History of Falls Interventions: Bed/chair exit alarm, Door open when patient unattended, Room close to nurse's station         Problem: Patient Education: Go to Patient Education Activity  Goal: Patient/Family Education  Outcome: Progressing Towards Goal     Problem: Pressure Injury - Risk of  Goal: *Prevention of pressure injury  Description  Document Wes Scale and appropriate interventions in the flowsheet.   Outcome: Progressing Towards Goal  Note:   Pressure Injury Interventions:  Sensory Interventions: Check visual cues for pain, Assess changes in LOC, Assess need for specialty bed, Float heels, Keep linens dry and wrinkle-free, Minimize linen layers, Monitor skin under medical devices    Moisture Interventions: Absorbent underpads, Apply protective barrier, creams and emollients, Internal/External urinary devices, Maintain skin hydration (lotion/cream), Minimize layers    Activity Interventions: Assess need for specialty bed, Pressure redistribution bed/mattress(bed type)    Mobility Interventions: Assess need for specialty bed, Float heels, Pressure redistribution bed/mattress (bed type)    Nutrition Interventions: Offer support with meals,snacks and hydration    Friction and Shear Interventions: Apply protective barrier, creams and emollients, Foam dressings/transparent film/skin sealants, Lift sheet, Lift team/patient mobility team, Minimize layers, Transferring/repositioning devices                Problem: Patient Education: Go to Patient Education Activity  Goal: Patient/Family Education  Outcome: Progressing Towards Goal     Problem: Non-Violent Restraints  Goal: *Removal from restraints as soon as assessed to be safe  Outcome: Progressing Towards Goal  Goal: *No harm/injury to patient while restraints in use  Outcome: Progressing Towards Goal  Goal: *Patient's dignity will be maintained  Outcome: Progressing Towards Goal  Goal: Non-violent Restaints:Standard Interventions  Outcome: Progressing Towards Goal  Goal: Non-violent Restraints:Patient Interventions  Outcome: Progressing Towards Goal  Goal: Patient/Family Education  Outcome: Progressing Towards Goal

## 2019-09-18 NOTE — HOSPICE
[facility]  Good Help to Those in Need  [phone]     GIP Daily Nursing Note   Patient Name: Messi Murillo  Date of Birth: 1945  Age: 74 y.o.     Date of Visit: 09/18/19  Facility of [facility]  Patient Room: [facility]     Hospice Attending: Yasmine Haile MD  Hospice Diagnosis: Sepsis [facility]) [A41.9]     Level of Care: GIP     Current GIP Symptoms    1. eyes partially open, no longer tracking, occasional blink and random eye movements  2. hypotensive, bradycardic  3. Long periods of Apnea, RR 6 this morning  3. Continue Morphine 4 mg IV q 2 hours and Robinul q2hr scheduled           ASSESSMENT & PLAN   1.  Continue current plan of care for symptom management  2. Monitor for any changes  3. Continue to update [employer] liaison daily  4. Remain GIP LOC         Spiritual Interventions: Hospice chaplains visit regularly and are available 24 hours a day as needed        Psych/ Social/ Emotional Interventions: Call to Debbie Leyva - updated on [address] [facility] [facility] granted for family visit on 09/19 w/ daughter and 4 addl family members, please see note for specific details     Care Coordination Needs: See above plus daily updates with [facility] hospice interdisciplinary team.       Care plan and New Orders discussed / approved with Kam Haile MD.     Description History and Chart Review      Narrative History of last 24 hours that demonstrates care cannot be provided in another setting:  Patient is at end of life.   There is a high likelihood of sudden decline   Requiring IV medications for symptom management at end of life      What has been done to control the patient's symptoms in the last 24 hours?   Scopolamine patch   Glcyopyrrolate 0.2 mg IV q 4 hours   Morphine 4 mg IV q 2 hours      Does the patient currently require IV medications? YES  Does the patient currently require scheduled medications? YES  Does the patient currently require a PCA? NO          Supporting documentation for GIP need for pain control:  [x] Frequent evaluation by a doctor, nurse practitioner, nurse   [] Frequent medication adjustment    [x] IVs that cannot be administered at home   [x] Aggressive pain management   [x] Complicated technical delivery of medications                                                                                                                                   Supporting documentation for GIP need for symptom control:  [x]  Sudden decline necessitating intensive nursing intervention  []  Uncontrolled / intractable nausea or vomiting   []  Pathological fractures  []  Advanced open wounds requiring frequent skilled care  [] Unmanageable respiratory distress  [] New or worsening delirium   [] Delirium with behavior issues: Is 24 hour caregiver present due to safety concerns with agitation? (yes/no)  [x] Imminent death  with skilled nursing needs documented above     DISCHARGE PLANNING   Daily discharge planning required for GIP  1. Discharge Plan: Education provided on symptoms at end of life yet to return to incarceration could be an option  2. Patient/Family teaching: No family at bedside. 3. Response to patient/family teaching: Receptive     ASSESSMENT    KARNOFSKY: 10     Quality Measure:       Patient self-reports:                 [x] No     ESAS:    Time of Assessment:   Pain (1-10): Fatigue (1-10):   Shortness of breath (1-10):   Nausea (1-10): Appetite (1-10):    Anxiety: (1-10):   Depression: (1-10):   Well-being: (1-10):   Constipation: _ Yes  _ No  LAST BM: 09/16/19     CLINICAL INFORMATION   No data found.     Currently this patient has:  [] Supplemental T5       FVD  [x] IV                      [] PICC                 [] PORT   [] NG Tube          [] PEG Tube        [] Ostomy     [x] Cummings draining scant amount of dark yellow concentrated urine  [] Other:      SIGNS/PHYSICAL FINDINGS      Skin (including wound):  [] Warm, dry, supple, intact and color normal for race  [] Warm   [x] Dry [x] Cool     [] Clammy       [] Diaphoretic    Turgor              [] Normal              [x] Decreased  Color:              [] Pink              [x] Pale              [] Cyanotic              [] Erythema              [x] Jaundice              [] Normal for Race  []  Wounds:     Neuro:  Minimally responsive  [] Lethargy  [] Restlessness / agitation  [] Confusion / delirium  [] Hallucinations  [x] Responds to maximal stimulation  [] Unresponsive  [] Seizures      Cardiac:  [] Dyspnea on Exertion  [] JVD  [] Murmur  [] Palpitations  [] Hypotension  [] Hypertension  [] Tachycardia  [x] Bradycardia  [] Irregular HR  [x] Pulses Decreased  [] Pulses Absent  [] Edema:       (Location, Grade and Pitting)  [] Mottling:      (Location)  Visit Vitals  BP 82/53   Pulse    Temp 96.8 °F (36 °C)   Resp 6   SpO2          Respiratory:  Breath sounds:scattered secretions               [x] Diminished              [] Wheeze              [] Rhonchi              [] Rales   [x] Even and unlabored  [] Labored:            [] Cough              [] Non Productive              [] Productive                          [] Description:           [] Deep suctioned    [] O2 at __2_ LPM  prn  [] High flow oxygen greater than 10 LPM  [] Bi-Pap     GI  [x] Abdomen flat  No bowel sounds   Peg tube in place  [] Ascites  [] Nausea  [] Vomiting  [] Incontinent of bowels  [] Bowel sounds (yes/no)  [] Diarrhea  [] Constipation (see above including last bowel movement)  [] Checked for impaction  [] Last BM      Nutrition  Diet:__npo________  Appetite:   [] Good   [] Fair   [] Poor   [] Tube Feeding        [] Voiding  [] Incontinent   [x] Cummings     Musculoskeletal  [] Balance/[place] Unsteady   [] Weak   Strength:               [] Normal               [] Limited               [x] Decreasing   Activities:               [] Up as tolerated              [x] Bedridden               [] Specify:     SAFETY  [] 24 hr. Caregiver   [x] Side rails ?    [x] Hospital bed [] Reviewed Falls & Safety         ALLERGIES AND MEDICATIONS      Allergies: No Known Allergies                 Current Facility-Administered Medications   Medication Dose Route Frequency    morphine injection 4 mg  4 mg IntraVENous Q2H    morphine injection 4 mg  4 mg IntraVENous Q15MIN PRN    acetaminophen (TYLENOL) suppository 650 mg  650 mg Rectal Q4H PRN    white petrolatum-mineral oil (LACRILUBE S.O.P.) ointment   Both Eyes Q12H    LORazepam (ATIVAN) injection 1 mg  1 mg IntraVENous Q15MIN PRN    ondansetron (ZOFRAN) injection 4 mg  4 mg IntraVENous Q4H PRN    scopolamine (TRANSDERM-SCOP) 1 mg over 3 days 1 Patch  1 Patch TransDERmal Q72H    glycopyrrolate (ROBINUL) injection 0.2 mg  0.2 mg IntraVENous Q4H    bisacodyl (DULCOLAX) suppository 10 mg  10 mg Rectal DAILY PRN             Visit Time In: 09:45  Visit Time Out: 10:00

## 2019-09-18 NOTE — HOSPICE
MSW returned call to patient's daughter to offer emotional support. Daughter appreciative of phone call and care that patient is getting through hospice. Daughter reports that family is coping appropriately and understanding that they need to arrange phone calls or visits with patient through the warden at correctional facility. MSW also shared that if any family member is having a difficult time, MSW can help to get them support through a hospice or bereavement service in their area as it is part of the hospice benefit. MSW reassured daughter that hospice team will be there to support patient and will keep family updated on changes in patient's condition. MSW inquired if family had considered final arrangements. Daughter shared that family choose [place] [facility] in [place], [place]; understand that corrections department will work with them on plans as they have to follow a certain procedure. Daughter appreciative of phone call and support offered. [facility] Time in: 4.10 pm 
Time out: 4.20 pm

## 2019-09-18 NOTE — ROUTINE PROCESS
Bedside shift change report given to LAURA Mantilla RN (oncoming nurse) by Felix Wild RN (offgoing nurse). Report included the following information SBAR, Kardex, Intake/Output, Recent Results and Quality Measures.

## 2019-09-18 NOTE — ROUTINE PROCESS
Bedside and Verbal shift change report given to [facility] (oncoming nurse) by Boone Loo RN (offgoing nurse). Report included the following information SBAR, Kardex, Intake/Output and Recent Results.

## 2019-09-18 NOTE — PROGRESS NOTES
[facility] Good Help to Those in Need 
[phone] Patient Name: Elizabeth Rodriguez YOB: 1945 Date of Provider Hospice Visit: 09/18/19 Level of Care:   [x] General Inpatient (GIP)    [] Routine   [] Respite Current Location of Care: 
[] [facility] [x] [facility] [] [address] [] [facility] [] [facility] IF [facility], patient referred from: 
[] [facility] [] [facility] [] [address] [facility] [] [facility] [] Home [] Other:  
 
Date of Original Hospice Admission: 9/2/2019 Hospice Medical Director at time of admission: Enmanuel Koch Principle Hospice Diagnosis: Sepsis Diagnoses RELATED to the terminal prognosis: Esophageal cancer, massive stroke, nonverbal, trach and PEG, severe protein malnutrition Other Diagnoses: [address] [place] [address] Dr Elizabeth Rodriguez is a 69y.o. year old who was admitted to [facility]. Patient is a 53-year-old gentleman who has a [place] correctional inmate. Patient with a history of extensive stroke that has left him nonverbal and he also has required a PEG and a trach. He has a history of esophageal cancer which  unfortunately do not know a lot of the significant details. He presents to the emergency room with sepsis, source unknown. Patient hypotensive but with significant issues with secretions and shortness of breath. After discussion with his daughter via phone, agreement and transition to inpatient hospice care to focus on his comfort. Patient an inmate in the correctional facility. We had to obtain consents verbally via phone secondary to his daughter living in [place] and unable to travel to the hospital.  In addition, we would need permission from the correctional facility to allow visitation to the hospital.  After discussion with his daughter, she was not going to be able to travel to [place] secondary to her work schedule. She states she had seen him earlier this year and was okay with the focus to be comfort. The patient's principle diagnosis has resulted in minimally responsive Refer to [facility] Functionally, the patient's Karnofsky and/or Palliative Performance Scale has declined over a period of days to weeks and is estimated at 10. The patient is dependent on the following ADLs: All Objective information that support this patients limited prognosis includes: 
Fever to 105 Albumin of 2.2 chest x-ray with diffuse patchy infiltrates edema versus infection- The patient/family chose comfort measures with the support of Hospice. HOSPICE DIAGNOSES Active Symptoms: 1. Shortness of breath 2. Secretions 3. Restlessness 4. Blepharitis 5. Pain: non verbal 
 
 PLAN 1.  and SW to support family needs 2. Continue [facility] level care. Patient still needing frequent nursing assessment and IV medication for management. He continues to show decline but this is been much slower than anticipated. As a reminder, this patient was admitted to [facility] level care, he had a blood pressure in the 60s and was showing evidence of septic shock. Somehow(no medical explanation) he has been able to survive for 2 weeks without nutrition/fluids. He was very sick even prior to admission and had been living in the [facility] at the correctional facility. Continue to be uncomfortable with him transitioning back to the correctional facility as we are managing him with subcutaneous medication. 3. Family to visit tomorrow. 4. Continue morphine 4 mg SC every 2 hours and every 15 minutes as needed. We will continue to monitor his needs. Do not feel adjustments needed at this time. No prn medications needed 5. Scopolamine patch and Robinul 0.2 milligrams SC every 4 hours scheduled for secretions 6. Continue  eyedrops for comfort secondary to his blepharitis. 7. Continue  Toradol for PRN fever. 8. Continue comfort medications as needed for other symptoms to include fever, nausea, agitation 9. Plan reviewed with Hospice nurse, Gianna Qiu. Have felt like he has been to unstable to move and Correctional facility would not be able to manage current symptoms with medications we are using. 10. Disposition: still think he likely will pass in the hospital 
 
Prognosis estimated based on 09/18/19 clinical assessment is:  
[x] Hours to Days   
[] Days to Weeks   
[] Other: 
 
Communicated plan of care with: Hospice Case Manager; Hospice IDT; Care Team 
 
 GOALS OF CARE Patient/Medical POA stated Goal of Care: Hospice care 
 
[x] I have reviewed and/or updated ACP information in the Advance Care Planning Navigator. This information is available in the [address] link in the patient's chart header. Primary Decision Maker ([address]):  
 
Resuscitation Status: DNR If DNR is there a Durable DNR on file? : [x] Yes [] No (If no, complete Durable DNR) HISTORY History obtained from: Chart, daughter, bedside nurse CHIEF COMPLAINT: Fever The patient is:  
[] Verbal 
[x] Nonverbal 
[] Unresponsive HPI/SUBJECTIVE: Patient is nonverbal.  His eyes are open but unable to follow commands or respond. Still with facial frowning and grimacing. Pt did have a bowel movement yesterday night and also urine output slightly better: 400cc this last shift Overnight received all scheduled robinul and morphine 9/9patient much more awake but not alert. Grabbing my hand at times but it almost seems involuntary. Occasional facial grimacing 9/10less grimacing today. Not as awake. 9/11-appears comfortable right now 9/12 patient much more somnolent today. Eyes are not open. 9/13patient clearly more ashen today. Eyes barely open. 9/16-patient not responsive with eyes closed most of the visit 9/17 patient appears comfortable today. Eyes slightly open 9/18patient resting. Eyes slightly open.  
 
 
 REVIEW OF SYSTEMS  
 
 The following systems were: [] reviewed  [x] unable to be reviewed Adult Non-Verbal Pain Assessment Score 1 Face 
[] 0   No particular expression or smile 
[x] 1   Occasional grimace, tearing, frowning, wrinkled forehead 
[] 2   Frequent grimace, tearing, frowning, wrinkled forehead Activity (movement) [x] 0   Lying quietly, normal position 
[] 1   Seeking attention through movement or slow, cautious movement 
[] 2   Restless, excessive activity and/or withdrawal reflexes Guarding 
[x] 0   Lying quietly, no positioning of hands over areas of body 
[] 1   Splinting areas of the body, tense 
[] 2   Rigid, stiff Physiology (vital signs) [x] 0   Stable vital signs [] 1   Change in any of the following: SBP > 20mm Hg; HR > 20/minute 
[] 2   Change in any of the following: SBP > 30mm Hg; HR > 25/minute Respiratory [x] 0   Baseline RR/SpO2, compliant with ventilator 
[] 1   RR > 10 above baseline, or 5% drop SpO2, mild asynchrony with ventilator 
[] 2   RR > 20 above baseline, or 10% drop SpO2, asynchrony with ventilator FUNCTIONAL ASSESSMENT Palliative Performance Scale (PPS): 10 PSYCHOSOCIAL/SPIRITUAL ASSESSMENT Active Problems: 
  Sepsis ([facility] (9/1/2019) Past Medical History:  
Diagnosis Date  [facility]/1/2019  Esophageal cancer ([facility] 9/1/2019  H/O tracheostomy 9/1/2019  
 History of completed stroke 9/1/2019 No past surgical history on file. Social History Tobacco Use  Smoking status: Unknown If Ever Smoked Substance Use Topics  Alcohol use: Not Currently Family History Family history unknown: Yes No Known Allergies Current Facility-Administered Medications Medication Dose Route Frequency  morphine injection 4 mg  4 mg IntraVENous Q2H  
 morphine injection 4 mg  4 mg IntraVENous Q15MIN PRN  
 acetaminophen (TYLENOL) suppository 650 mg  650 mg Rectal Q4H PRN  
  white petrolatum-mineral oil (LACRILUBE S.O.P.) ointment   Both Eyes Q12H  
 LORazepam (ATIVAN) injection 1 mg  1 mg IntraVENous Q15MIN PRN  
 ondansetron (ZOFRAN) injection 4 mg  4 mg IntraVENous Q4H PRN  
 scopolamine (TRANSDERM-SCOP) 1 mg over 3 days 1 Patch  1 Patch TransDERmal Q72H  
 glycopyrrolate (ROBINUL) injection 0.2 mg  0.2 mg IntraVENous Q4H  
 bisacodyl (DULCOLAX) suppository 10 mg  10 mg Rectal DAILY PRN  
 
 
 PHYSICAL EXAM  
 
Wt Readings from Last 3 Encounters:  
09/17/19 145 lb (65.8 kg) 09/01/19 145 lb (65.8 kg) 07/30/19 145 lb (65.8 kg) Visit Vitals BP (!) 82/53 (BP 1 Location: Left arm, BP Patient Position: At rest) Pulse (!) 53 Temp 96.8 °F (36 °C) Resp 11 Wt 145 lb (65.8 kg) SpO2 90% BMI 23.40 kg/m² Supplemental O2  [x] Yes  [] NO Last bowel movement:  
 
Currently this patient has: 
[x] Peripheral IV [] PICC  [] PORT [] ICD [x] Cummings Catheter [] NG Tube   [x] PEG Tube   
[] Rectal Tube [] Drain 
[] Other:  
 
Constitutional: Ill-appearing, ashen, nonverbal, eyes slightly open Eyes: Eyes are open but sunken ENMT: [place] in place. Cardiovascular: Regular rhythm Respiratory: Breathing has slowed, minimal secretions, minimal air movement Gastrointestinal: Soft, PEG in place Musculoskeletal: Muscle wasting, emaciated Skin: Poor skin turgor, cool extremities Neurologic: Nonverbal 
Psychiatric: Calm Other:  
 
 
Pertinent Lab and or Imaging Tests: 
Lab Results Component Value Date/Time  Sodium 139 09/01/2019 05:19 PM  
 Potassium 5.3 (H) 09/01/2019 05:19 PM  
 Chloride 101 09/01/2019 05:19 PM  
 CO2 28 09/01/2019 05:19 PM  
 Anion gap 10 09/01/2019 05:19 PM  
 Glucose 122 (H) 09/01/2019 05:19 PM  
 BUN 53 (H) 09/01/2019 05:19 PM  
 Creatinine 2.12 (H) 09/01/2019 05:19 PM  
 BUN/Creatinine ratio 25 (H) 09/01/2019 05:19 PM  
 GFR est AA 37 (L) 09/01/2019 05:19 PM  
 GFR est non-AA 31 (L) 09/01/2019 05:19 PM  
 Calcium 8.6 09/01/2019 05:19 PM  
 
 Lab Results Component Value Date/Time  Protein, total 7.4 09/01/2019 05:19 PM  
 Albumin 2.2 (L) 09/01/2019 05:19 PM  
 
   
 
Total time:  
Counseling / coordination time:  
> 50% counseling / coordination?:

## 2019-09-18 NOTE — PROGRESS NOTES
Bedside shift change report given to Nicole (oncoming nurse) by RN (offgoing nurse). Report included the following information SBAR, Kardex, Intake/Output and Recent Results.

## 2019-09-19 NOTE — ROUTINE PROCESS
Bedside shift change report given to Abena Angeles RN and Claudeen Pore, RN(oncoming nurse) by Alyssa Ochoa RN (offgoing nurse). Report included the following information SBAR, Kardex, Intake/Output, Recent Results and Quality Measures.

## 2019-09-19 NOTE — PROGRESS NOTES
Problem: Falls - Risk of 
Goal: *Absence of Falls Description Document Rut Goodson Fall Risk and appropriate interventions in the flowsheet. Outcome: Progressing Towards Goal 
Note:  
Fall Risk Interventions: 
Mobility Interventions: Bed/chair exit alarm Mentation Interventions: Bed/chair exit alarm, Door open when patient unattended Medication Interventions: Evaluate medications/consider consulting pharmacy, Bed/chair exit alarm Elimination Interventions: Call light in reach, Bed/chair exit alarm History of Falls Interventions: Bed/chair exit alarm Problem: Pressure Injury - Risk of 
Goal: *Prevention of pressure injury Description Document Wes Scale and appropriate interventions in the flowsheet. Outcome: Progressing Towards Goal 
Note:  
Pressure Injury Interventions: 
Sensory Interventions: Keep linens dry and wrinkle-free, Minimize linen layers, Pressure redistribution bed/mattress (bed type) Moisture Interventions: Absorbent underpads, Apply protective barrier, creams and emollients, Check for incontinence Q2 hours and as needed Activity Interventions: Assess need for specialty bed, Pressure redistribution bed/mattress(bed type) Mobility Interventions: HOB 30 degrees or less, Assess need for specialty bed, Pressure redistribution bed/mattress (bed type) Nutrition Interventions: Document food/fluid/supplement intake Friction and Shear Interventions: HOB 30 degrees or less, Apply protective barrier, creams and emollients Problem: Non-Violent Restraints Goal: *Removal from restraints as soon as assessed to be safe Outcome: Progressing Towards Goal 
Goal: *No harm/injury to patient while restraints in use Outcome: Progressing Towards Goal 
Goal: *Patient's dignity will be maintained Outcome: Progressing Towards Goal 
Goal: Non-violent Restaints:Standard Interventions Outcome: Progressing Towards Goal 
Goal: Non-violent Restraints:Patient Interventions Outcome: Progressing Towards Goal

## 2019-09-19 NOTE — PROGRESS NOTES
[facility] Good Help to Those in Need 
[phone] Patient Name: Simin Bañuelos YOB: 1945 Date of Provider Hospice Visit: 09/19/19 Level of Care:   [x] General Inpatient (GIP)    [] Routine   [] Respite Current Location of Care: 
[] [facility] [x] [facility] [] [facility] [] [facility] [] [facility] IF [facility] [place], patient referred from: 
[] [facility] [] [facility] [] [facility] [] [facility] [] Home [] Other:  
 
Date of Original Hospice Admission: 9/2/2019 Hospice Medical Director at time of admission: Rosa Ochoa Principle Hospice Diagnosis: Sepsis Diagnoses RELATED to the terminal prognosis: Esophageal cancer, massive stroke, nonverbal, trach and PEG, severe protein malnutrition Other Diagnoses: Damion Bañuelos is a 69y.o. year old who was admitted to [facility]. Patient is a 81-year-old gentleman who has a [place] correctional inmate. Patient with a history of extensive stroke that has left him nonverbal and he also has required a PEG and a trach. He has a history of esophageal cancer which  unfortunately do not know a lot of the significant details. He presents to the emergency room with sepsis, source unknown. Patient hypotensive but with significant issues with secretions and shortness of breath. After discussion with his daughter via phone, agreement and transition to inpatient hospice care to focus on his comfort. Patient an inmate in the correctional facility. We had to obtain consents verbally via phone secondary to his daughter living in [place] and unable to travel to the hospital.  In addition, we would need permission from the correctional facility to allow visitation to the hospital.  After discussion with his daughter, she was not going to be able to travel to [place] secondary to her work schedule. She states she had seen him earlier this year and was okay with the focus to be comfort. The patient's principle diagnosis has resulted in minimally responsive Refer to LCD Functionally, the patient's Karnofsky and/or Palliative Performance Scale has declined over a period of days to weeks and is estimated at 10. The patient is dependent on the following ADLs: All Objective information that support this patients limited prognosis includes: 
Fever to 105 Albumin of 2.2 chest x-ray with diffuse patchy infiltrates edema versus infection- The patient/family chose comfort measures with the support of Hospice. HOSPICE DIAGNOSES Active Symptoms: 1. Shortness of breath 2. Secretions 3. Restlessness 4. Blepharitis 5. Pain: non verbal 
 
 PLAN 1.  and SW to support family needs 2. Continue [facility] level care. Patient clearly with some decline today and actually showing more signs of distress with increased work of breathing. Will make adjustments in his medication. 3. Family was able to visit to day. We were able to sit down with him and discuss all of his medical issues and reviewed the current plan. Appreciate (Shabana) support as well as Carrie, hospice liaison who were able to talk to the family until I arrived. We answered all questions and they appeared to be comfortable with the current plan. 4. Increase morphine to 6 mg SC every 2 hours and every 15 minutes as needed. We will continue to monitor his needs. Clearly with some increased work of breathing and appeared to be in some mild distress. 5. Scopolamine patch and Robinul 0.2 milligrams SC every 4 hours scheduled for secretions 6. Continue  eyedrops for comfort secondary to his blepharitis. 7. Continue  Toradol for PRN fever. 8. Continue comfort medications as needed for other symptoms to include fever, nausea, agitation 9. Plan reviewed with Hospice nurseIgnacio Officer.  Have felt like he has been to unstable to move and Correctional facility would not be able to manage current symptoms with medications we are using. 10. Disposition: still think he likely will pass in the hospital 
 
Prognosis estimated based on 09/19/19 clinical assessment is:  
[x] Hours to Days   
[] Days to Weeks   
[] Other: 
 
Communicated plan of care with: Hospice Case Manager; Hospice IDT; Care Team 
 
 GOALS OF CARE Patient/Medical POA stated Goal of Care: Hospice care 
 
[x] I have reviewed and/or updated ACP information in the Advance Care Planning Navigator. This information is available in the [address] link in the patient's chart header. Primary Decision Maker ([address]):  
 
Resuscitation Status: DNR If DNR is there a Durable DNR on file? : [x] Yes [] No (If no, complete Durable DNR) HISTORY History obtained from: Chart, daughter, bedside nurse CHIEF COMPLAINT: Fever The patient is:  
[] Verbal 
[x] Nonverbal 
[] Unresponsive HPI/SUBJECTIVE: Patient is nonverbal.  His eyes are open but unable to follow commands or respond. Still with facial frowning and grimacing. Pt did have a bowel movement yesterday night and also urine output slightly better: 400cc this last shift Overnight received all scheduled robinul and morphine 9/9patient much more awake but not alert. Grabbing my hand at times but it almost seems involuntary. Occasional facial grimacing 9/10less grimacing today. Not as awake. 9/11-appears comfortable right now 9/12 patient much more somnolent today. Eyes are not open. 9/13patient clearly more ashen today. Eyes barely open. 9/16-patient not responsive with eyes closed most of the visit 9/17 patient appears comfortable today. Eyes slightly open 9/18patient resting. Eyes slightly open. 9/19clearly with increased work of breathing appears mildly uncomfortable. Remains unresponsive REVIEW OF SYSTEMS The following systems were: [] reviewed  [x] unable to be reviewed Adult Non-Verbal Pain Assessment Score3 Face 
[] 0   No particular expression or smile 
[x] 1   Occasional grimace, tearing, frowning, wrinkled forehead 
[] 2   Frequent grimace, tearing, frowning, wrinkled forehead Activity (movement) [] 0   Lying quietly, normal position 
[x] 1   Seeking attention through movement or slow, cautious movement 
[] 2   Restless, excessive activity and/or withdrawal reflexes Guarding 
[x] 0   Lying quietly, no positioning of hands over areas of body 
[] 1   Splinting areas of the body, tense 
[] 2   Rigid, stiff Physiology (vital signs) [x] 0   Stable vital signs [] 1   Change in any of the following: SBP > 20mm Hg; HR > 20/minute 
[] 2   Change in any of the following: SBP > 30mm Hg; HR > 25/minute Respiratory 
[] 0   Baseline RR/SpO2, compliant with ventilator 
[x] 1   RR > 10 above baseline, or 5% drop SpO2, mild asynchrony with ventilator 
[] 2   RR > 20 above baseline, or 10% drop SpO2, asynchrony with ventilator FUNCTIONAL ASSESSMENT Palliative Performance Scale (PPS): 10 PSYCHOSOCIAL/SPIRITUAL ASSESSMENT Active Problems: 
  Sepsis ([facility] [address] (9/1/2019) Past Medical History:  
Diagnosis Date  CHF (congestive heart failure) ([facility] [address] 9/1/2019  Esophageal cancer ([facility] 9/1/2019  H/O tracheostomy 9/1/2019  
 History of completed stroke 9/1/2019 No past surgical history on file. Social History Tobacco Use  Smoking status: Unknown If Ever Smoked Substance Use Topics  Alcohol use: Not Currently Family History Family history unknown: Yes No Known Allergies Current Facility-Administered Medications Medication Dose Route Frequency  morphine injection 6 mg  6 mg IntraVENous Q2H  
 morphine injection 4 mg  4 mg IntraVENous Q15MIN PRN  
 acetaminophen (TYLENOL) suppository 650 mg  650 mg Rectal Q4H PRN  
 white petrolatum-mineral oil (LACRILUBE S.O.P.) ointment   Both Eyes Q12H  LORazepam (ATIVAN) injection 1 mg  1 mg IntraVENous Q15MIN PRN  
 ondansetron (ZOFRAN) injection 4 mg  4 mg IntraVENous Q4H PRN  
 scopolamine (TRANSDERM-SCOP) 1 mg over 3 days 1 Patch  1 Patch TransDERmal Q72H  
 glycopyrrolate (ROBINUL) injection 0.2 mg  0.2 mg IntraVENous Q4H  
 bisacodyl (DULCOLAX) suppository 10 mg  10 mg Rectal DAILY PRN  
 
 
 PHYSICAL EXAM  
 
Wt Readings from Last 3 Encounters:  
09/17/19 145 lb (65.8 kg) 09/01/19 145 lb (65.8 kg) 07/30/19 145 lb (65.8 kg) Visit Vitals BP (!) 84/50 (BP 1 Location: Left arm, BP Patient Position: At rest) Pulse (!) 49 Temp (!) 94.3 °F (34.6 °C) Resp 10 Wt 145 lb (65.8 kg) SpO2 90% BMI 23.40 kg/m² Supplemental O2  [x] Yes  [] NO Last bowel movement:  
 
Currently this patient has: 
[x] Peripheral IV [] PICC  [] PORT [] ICD [x] Cummings Catheter [] NG Tube   [x] PEG Tube   
[] Rectal Tube [] Drain 
[] Other:  
 
Constitutional: Ill-appearing, ashen, nonverbal, eyes minimally open Eyes: Eyes are open but sunken ENMT: Erroll Snipe in place. Cardiovascular: Regular rhythm Respiratory: Breathing with increased work of breathing and slight accessory muscle use noted. Gastrointestinal: Soft, PEG in place Musculoskeletal: Muscle wasting, emaciated Skin: Poor skin turgor, cool extremities Neurologic: Nonverbal 
Psychiatric: Calm Other:  
 
 
Pertinent Lab and or Imaging Tests: 
Lab Results Component Value Date/Time Sodium 139 09/01/2019 05:19 PM  
 Potassium 5.3 (H) 09/01/2019 05:19 PM  
 Chloride 101 09/01/2019 05:19 PM  
 CO2 28 09/01/2019 05:19 PM  
 Anion gap 10 09/01/2019 05:19 PM  
 Glucose 122 (H) 09/01/2019 05:19 PM  
 BUN 53 (H) 09/01/2019 05:19 PM  
 Creatinine 2.12 (H) 09/01/2019 05:19 PM  
 BUN/Creatinine ratio 25 (H) 09/01/2019 05:19 PM  
 GFR est AA 37 (L) 09/01/2019 05:19 PM  
 GFR est non-AA 31 (L) 09/01/2019 05:19 PM  
 Calcium 8.6 09/01/2019 05:19 PM  
 
Lab Results Component Value Date/Time Protein, total 7.4 09/01/2019 05:19 PM  
 Albumin 2.2 (L) 09/01/2019 05:19 PM  
 
   
 
Total time:  
Counseling / coordination time:  
> 50% counseling / coordination?:

## 2019-09-19 NOTE — PROGRESS NOTES
Joined with Mr. Stephanie Das family and Hospice RN in the consult room on 5th floor. Mr. Stephanie Das daughter was tearful. Provided gentle spiritual presence as she shared her tears. Dr. Myles Aguirre joined us in the consult room provided support and answering the questions he could about Mr. Murillo's journey here in the hospital.  Family indicated Mr. Henrietta Johnson has a Yazidi belief in God and prayer would absolutely be appropriate. Able to join Mr. Henrietta Johnson and his daughter at bedside for prayer. As family left, provided assurance of continued prayer for Mr. Murillo and family. Visited by: Shana Lillys., MS., [address] ([id number])

## 2019-09-19 NOTE — ROUTINE PROCESS
Bedside and Verbal shift change report given to Sarah Rivas RN (oncoming nurse) by Jaycee Gaytan RN (offgoing nurse). Report included the following information SBAR, Kardex, Intake/Output, MAR, Accordion and Recent Results.

## 2019-09-19 NOTE — HOSPICE
[facility] Good Help to Those in Need 
[phone] 
  
GIP Daily Nursing Note Patient Ramón Pardo Date of Birth: 1945 Age: 74 y.o. 
  
Date of Visit: 09/19/19 Facility of Care: [facility] Patient Room: [facility] 
  
Hospice Attending: Ana Haile MD 
Hospice Diagnosis: Sepsis [facility]) [A41.9]   Level of Care: GIP 
  
Current GIP Symptoms 1. eyes partially open, no longer tracking, occasional blink and random eye movements 2. hypotensive, bradycardic, increased work of breathing today, hypothermic 3. Long periods of Apnea, RR 6 this morning 3. Increase Morphine to 6 mg IV q 2 hours and continue Robinul q2hr scheduled 
  
  
  
ASSESSMENT & PLAN 1.  Continue current plan of care for symptom management 2. Monitor for any changes 3. Continue to update [employer] liaison daily 4. Remain GIP LOC 
   
  
Spiritual Interventions: Hospital  met with daughter and other family members who travelled from [place] today to visit patient to offer support.  and daughter gathered at bedside for prayer with patient. Psych/ Social/ Emotional Interventions: With permission of ,  family members met with patient today for visit in presence of correctional officers. Dr. Temitope Schmidt met with family and reviewed POC. Family verbalized understanding that patient is at end of life and expressed gratitude for hospice care.  
  
Care Coordination Needs: See above plus daily updates with [facility] hospice interdisciplinary team.   
  
Care plan and New Orders discussed / approved with Kam Haile MD. 
  
Description History and Chart Review  
  
Narrative History of last 24 hours that demonstrates care cannot be provided in another setting: 
Patient is at end of life.   There is a high likelihood of sudden decline  
Requiring IV medications for symptom management at end of life  
  
What has been done to control the patient's symptoms in the last 24 hours? Scopolamine patch   Glcyopyrrolate 0.2 mg IV q 4 hours   Morphine 4 mg IV q 2 hours  
  
Does the patient currently require IV medications? YES Does the patient currently require scheduled medications? YES Does the patient currently require a PCA? NO 
   
 Supporting documentation for GIP need for pain control: 
[x] Frequent evaluation by a doctor, nurse practitioner, nurse  
[] Frequent medication adjustment   
[x] IVs that cannot be administered at home  
[x] Aggressive pain management [x] Complicated technical delivery of medications                                                                                                                                  
Supporting documentation for GIP need for symptom control: 
[x]  Sudden decline necessitating intensive nursing intervention 
[]  Uncontrolled / intractable nausea or vomiting  
[]  Pathological fractures []  Advanced open wounds requiring frequent skilled care 
[] Unmanageable respiratory distress 
[] New or worsening delirium  
[] Delirium with behavior issues: Is 24 hour caregiver present due to safety concerns with agitation? (yes/no) [x] Imminent death  with skilled nursing needs documented above 
  
DISCHARGE PLANNING 1. Education provided on symptoms at end of life 2. Patient/Family teaching: No family at bedside. 3. Response to patient/family teaching: Receptive 
  
ASSESSMENT   
KARNOFSKY: 10 
  
Quality Measure:       Patient self-reports:                 [x] No 
  
ESAS:  
 Time of Assessment:  
Pain (1-10): Fatigue (1-10): Shortness of breath (1-10):  
Nausea (1-10): Appetite (1-10): Anxiety: (1-10): Depression: (1-10): Well-being: (1-10): Constipation: _ Yes  _ No 
LAST BM: 09/16/19 
  
CLINICAL INFORMATION No data found. 
  
Currently this patient has: 
[] Supplemental O2       Prn 
[x] IV                     
[] PICC                
[] PORT  
[] NG Tube         
[] PEG Tube       
[] Ostomy    
 [x] Cummings draining scant amount of dark yellow concentrated urine 
[] Other:  
  
SIGNS/PHYSICAL FINDINGS  
  
Skin (including wound): 
[] Warm, dry, supple, intact and color normal for race 
[] Warm  
[x] Dry  
[x] Cool    
[] Clammy      
[] Diaphoretic   
Turgor             [] Normal 
            [x] Decreased Color: 
            [] Pink 
            [x] Pale 
            [] Cyanotic 
            [] Erythema 
            [x] Jaundice             [] Normal for Race 
[]  Wounds: 
  
Neuro:  Minimally responsive 
[] Lethargy 
[] Restlessness / agitation 
[] Confusion / delirium 
[] Hallucinations [x] Responds to maximal stimulation 
[] Unresponsive 
[] Seizures  
  
Cardiac: 
[] Dyspnea on Exertion 
[] JVD [] Murmur 
[] Palpitations 
[] Hypotension 
[] Hypertension 
[] Tachycardia [x] Bradycardia 
[] Irregular HR [x] Pulses Decreased 
[] Pulses Absent 
[] Edema:       (Location, Grade and Pitting) [] Mottling:      (Location) Visit Vitals BP 84/50 Pulse  49 Temp 94.3 °F (36 °C) Resp 10 SpO2    
  
  
Respiratory: 
Breath sounds:scattered secretions  
            [x] Diminished 
            [] Wheeze             [] Rhonchi 
            [] Rales [x] Even and unlabored 
[] Labored:           
[] Cough             [] Non Productive             [] Productive                         [] Description:          
[] Deep suctioned   
[] O2 at __2_ LPM  prn 
[] High flow oxygen greater than 10 LPM 
[] Bi-Pap 
  
GI [x] Abdomen flat  No bowel sounds   Peg tube in place 
[] Ascites 
[] Nausea 
[] Vomiting 
[] Incontinent of bowels 
[] Bowel sounds (yes/no) 
[] Diarrhea 
[] Constipation (see above including last bowel movement) 
[] Checked for impaction 
[] Last BM  
  
Nutrition Diet:__npo________ Appetite:  
[] Good  
[] Fair  
[] Poor  
[] Tube Feeding  
  
 
[] Voiding 
[] Incontinent [x] Cummings 
  
Musculoskeletal 
[] Balance/[place] Unsteady  
[] Weak Strength:  
            [] Normal  
             [] Limited  
            [x] Decreasing Activities:  
            [] Up as tolerated 
            [x] Bedridden  
            [] Specify: 
  
SAFETY 
[] 24 hr. Caregiver [x] Side rails ?   
[x] Hospital bed  
[] Reviewed Falls & Safety  
  
  
ALLERGIES AND MEDICATIONS  
  
Allergies: No Known Allergies   
           
Current Facility-Administered Medications Medication Dose Route Frequency  morphine injection 4 mg  4 mg IntraVENous Q2H  
 morphine injection 4 mg  4 mg IntraVENous Q15MIN PRN  
 acetaminophen (TYLENOL) suppository 650 mg  650 mg Rectal Q4H PRN  
 white petrolatum-mineral oil (LACRILUBE S.O.P.) ointment   Both Eyes Q12H  
 LORazepam (ATIVAN) injection 1 mg  1 mg IntraVENous Q15MIN PRN  
 ondansetron (ZOFRAN) injection 4 mg  4 mg IntraVENous Q4H PRN  
 scopolamine (TRANSDERM-SCOP) 1 mg over 3 days 1 Patch  1 Patch TransDERmal Q72H  
 glycopyrrolate (ROBINUL) injection 0.2 mg  0.2 mg IntraVENous Q4H  
 bisacodyl (DULCOLAX) suppository 10 mg  10 mg Rectal DAILY PRN  
  
  
   
Visit Time In: 12:45 Visit Time ZJV: 1:05

## 2019-09-19 NOTE — PROGRESS NOTES
As the RN orienting Jeison Cedeno RN, I affirm that I have reviewed and agree with her documentation as presented.

## 2019-09-19 NOTE — HOSPICE
Report to [facility], Scott Stone, [phone] regarding patient's status and successful family visit.

## 2019-09-20 NOTE — ROUTINE PROCESS
As the RN orienting Magda Sharp RN, I affirm that I have reviewed and agree with her documentation as presented.

## 2019-09-20 NOTE — HOSPICE
[facility] [employer] Good Help to Those in Need 
[phone] 
  
[facility] Daily Nursing Note Patient Janet Suárez Date of Birth: 1945 Age: 74 y.o. 
  
Date of Visit: 09/20/19 Facility of Care: [facility] Patient Room: [facility] 
  
Hospice Attending: Neena Haile MD 
Hospice Diagnosis: Sepsis [facility]) [A41.9]   Level of Care: GIP 
  
Current GIP Symptoms 1. eyes partially open, no longer tracking, occasional blink and random eye movements 2. hypotensive, bradycardic, hypothermic 3. Long periods of Apnea, measured up to 50sec, RR 10 this morning 4. Continue Morphine to 6 mg IV q 2 hours and continue Robinul q2hr scheduled 5. Skin cool- cold to touch, edema noted throughout upper extremities   
  
  
ASSESSMENT & PLAN 1.  Continue current plan of care for symptom management 2. Monitor for any changes 3. Continue to update DOJ liaison daily 4. Remain GIP LOC 
   
  
Spiritual Interventions: continue  visits as indicated 
  
Psych/ Social/ Emotional Interventions: continue MSW visits as indicated, update family if any changes to patient condition  
  
Care Coordination Needs: daily updates with DOJ liaison and [facility] hospice interdisciplinary team.   
  
Care plan and New Orders discussed / approved with Kam Haile MD. 
  
Description History and Chart Review  
  
Narrative History of last 24 hours that demonstrates care cannot be provided in another setting: 
Patient is at end of life  BP this AM is 42/32.   There is a high likelihood of sudden decline, unstable for transport out of the hospital continues to require IV medications for symptom management at end of life  
  
What has been done to control the patient's symptoms in the last 24 hours? Scopolamine patch   Glcyopyrrolate 0.2 mg IV q 4 hours   Morphine 6 mg IV q 2 hours  
  
Does the patient currently require IV medications? YES Does the patient currently require scheduled medications? YES 
 Does the patient currently require a PCA? NO 
   
 Supporting documentation for GIP need for pain control: 
[x] Frequent evaluation by a doctor, nurse practitioner, nurse  
[] Frequent medication adjustment   
[x] IVs that cannot be administered at home  
[x] Aggressive pain management [x] Complicated technical delivery of medications                                                                                                                                  
Supporting documentation for GIP need for symptom control: 
[x]  Sudden decline necessitating intensive nursing intervention 
[]  Uncontrolled / intractable nausea or vomiting  
[]  Pathological fractures []  Advanced open wounds requiring frequent skilled care 
[] Unmanageable respiratory distress 
[] New or worsening delirium  
[] Delirium with behavior issues: Is 24 hour caregiver present due to safety concerns with agitation? (yes/no) [x] Imminent death  with skilled nursing needs documented above 
  
DISCHARGE PLANNING  
  
1. Education provided on symptoms at end of life 2. Patient/Family teaching: No family at bedside. 3. Response to patient/family teaching: Receptive 
  
ASSESSMENT   
KARNOFSKY: 10 
  
Quality Measure:       Patient self-reports:                 [x] No 
  
ESAS:  
 Time of Assessment:  
Pain (1-10): Fatigue (1-10): Shortness of breath (1-10):  
Nausea (1-10): Appetite (1-10): Anxiety: (1-10): Depression: (1-10): Well-being: (1-10): Constipation: _ Yes  _ No 
LAST BM: 09/16/19 
  
CLINICAL INFORMATION No data found. 
  
Currently this patient has: 
[] Supplemental O2       Prn 
[x] IV                     
[] PICC                
[] PORT  
[] NG Tube         
[] PEG Tube       
[] Ostomy    
[x] Cummings draining scant amount of dark yellow concentrated urine 
[] Other:  
  
SIGNS/PHYSICAL FINDINGS  
  
Skin (including wound): 
[] Warm, dry, supple, intact and color normal for race 
[] Warm  
[x] Dry  
[x] Cool    
 [] Clammy      
[] Diaphoretic   
Turgor             [] Normal 
            [x] Decreased Color: 
            [] Pink 
            [x] Pale 
            [] Cyanotic 
            [] Erythema 
            [x] Jaundice             [] Normal for Race 
[]  Wounds: 
  
Neuro:  Minimally responsive 
[] Lethargy 
[] Restlessness / agitation 
[] Confusion / delirium 
[] Hallucinations [x] Responds to maximal stimulation 
[] Unresponsive 
[] Seizures  
  
Cardiac: 
[] Dyspnea on Exertion 
[] JVD [] Murmur 
[] Palpitations 
[] Hypotension 
[] Hypertension 
[] Tachycardia [x] Bradycardia 
[] Irregular HR [x] Pulses Decreased 
[] Pulses Absent [x] Edema:      upper extremities, hands 
[] Mottling:      (Location) Visit Vitals BP 42/32 Pulse  50 Temp 94.3 °F (36 °C) Resp 10 SpO2    
  
  
Respiratory: 
Breath sounds:scattered secretions  
            [x] Diminished 
            [] Wheeze             [] Rhonchi 
            [] Rales [x] Even and unlabored 
[] Labored:           
[] Cough             [] Non Productive             [] Productive                         [] Description:          
[] Deep suctioned   
[] O2 at __2_ LPM  prn 
[] High flow oxygen greater than 10 LPM 
[] Bi-Pap 
  
GI [x] Abdomen flat  No bowel sounds   Peg tube in place 
[] Ascites 
[] Nausea 
[] Vomiting 
[] Incontinent of bowels 
[] Bowel sounds (yes/no) 
[] Diarrhea 
[] Constipation (see above including last bowel movement) 
[] Checked for impaction 
[] Last BM  
  
Nutrition Diet:__npo________ Appetite:  
[] Good  
[] Fair  
[] Poor  
[] Tube Feeding  
  
 
[] Voiding 
[] Incontinent [x] Cummings 
  
Musculoskeletal 
[] Balance/[place] Unsteady  
[] Weak Strength:  
            [] Normal  
            [] Limited  
            [x] Decreasing Activities:  
            [] Up as tolerated 
            [x] Bedridden  
            [] Specify: 
  
SAFETY [x] 24 hr. supervision by DOJ guards  
[x] Side rails ?   
 [x] Hospital bed  
[] Reviewed Falls & Safety  
  
  
ALLERGIES AND MEDICATIONS  
  
Allergies: No Known Allergies   
           
Current Facility-Administered Medications Medication Dose Route Frequency  morphine injection 4 mg  4 mg IntraVENous Q2H  
 morphine injection 4 mg  4 mg IntraVENous Q15MIN PRN  
 acetaminophen (TYLENOL) suppository 650 mg  650 mg Rectal Q4H PRN  
 white petrolatum-mineral oil (LACRILUBE S.O.P.) ointment   Both Eyes Q12H  
 LORazepam (ATIVAN) injection 1 mg  1 mg IntraVENous Q15MIN PRN  
 ondansetron (ZOFRAN) injection 4 mg  4 mg IntraVENous Q4H PRN  
 scopolamine (TRANSDERM-SCOP) 1 mg over 3 days 1 Patch  1 Patch TransDERmal Q72H  
 glycopyrrolate (ROBINUL) injection 0.2 mg  0.2 mg IntraVENous Q4H  
 bisacodyl (DULCOLAX) suppository 10 mg  10 mg Rectal DAILY PRN  
  
  
   
Visit Time In: 09:00 Visit Time XKK: 80:40

## 2019-09-20 NOTE — PROGRESS NOTES
Bedside and Verbal shift change report given to Kirt Muir (oncoming nurse) by [employer] nurse). Report included the following information SBAR and Kardex.

## 2019-09-20 NOTE — PROGRESS NOTES
[facility] Good Help to Those in Need 
[phone] Patient Name: Flora Knox YOB: 1945 Date of Provider Hospice Visit: 09/20/19 Level of Care:   [x] General Inpatient (GIP)    [] Routine   [] Respite Current Location of Care: 
[] [facility] [x] [facility] [] [facility] [] [facility] [] Hospice [facility], patient referred from: 
[] [facility] [] [facility] [] [facility] [] [facility] [] Home [] Other:  
 
Date of Original Hospice Admission: 9/2/2019 Hospice Medical Director at time of admission: Siddhartha Prescott Principle Hospice Diagnosis: Sepsis Diagnoses RELATED to the terminal prognosis: Esophageal cancer, massive stroke, nonverbal, trach and PEG, severe protein malnutrition Other Diagnoses: Mamie Knox is a 69y.o. year old who was admitted to [facility]. Patient is a 44-year-old gentleman who has a [facility] correctional inmate. Patient with a history of extensive stroke that has left him nonverbal and he also has required a PEG and a trach. He has a history of esophageal cancer which  unfortunately do not know a lot of the significant details. He presents to the emergency room with sepsis, source unknown. Patient hypotensive but with significant issues with secretions and shortness of breath. After discussion with his daughter via phone, agreement and transition to inpatient hospice care to focus on his comfort. Patient an inmate in the correctional facility. We had to obtain consents verbally via phone secondary to his daughter living in [place] and unable to travel to the hospital.  In addition, we would need permission from the correctional facility to allow visitation to the hospital.  After discussion with his daughter, she was not going to be able to travel to [place] secondary to her work schedule. She states she had seen him earlier this year and was okay with the focus to be comfort. The patient's principle diagnosis has resulted in minimally responsive Refer to [facility] Functionally, the patient's Karnofsky and/or Palliative Performance Scale has declined over a period of days to weeks and is estimated at 10. The patient is dependent on the following ADLs: All Objective information that support this patients limited prognosis includes: 
Fever to 105 Albumin of 2.2 chest x-ray with diffuse patchy infiltrates edema versus infection- The patient/family chose comfort measures with the support of Hospice. HOSPICE DIAGNOSES Active Symptoms: 1. Shortness of breath 2. Secretions 3. Restlessness 4. Blepharitis 5. Pain: non verbal 
 
 PLAN 1.  and SW to support family needs 2. Continue GIP level care. Adjusted his morphine dose yesterday. He needs ongoing GIP level care secondary to frequent nursing assessment and ongoing needs for subcutaneous medication. His blood pressure is in the 40s today and having long periods of apnea. 3. Family was able to visit yesterday. 4. Continue morphine to 6 mg SC every 2 hours and every 15 minutes as needed. We will continue to monitor his needs. Breathing clearly less labored. Once again periods of apnea noted. No PRN medications needed. 5. Scopolamine patch and Robinul 0.2 milligrams SC every 4 hours scheduled for secretions 6. Continue  eyedrops for comfort secondary to his blepharitis. 7. Continue  Toradol for PRN fever. 8. Continue comfort medications as needed for other symptoms to include fever, nausea, agitation 9. Plan reviewed with Hospice nurse, [place]. Have felt like he has been to unstable to move and Correctional facility would not be able to manage current symptoms with medications we are using. 10. Disposition: still think he likely will pass in the hospital 
 
Prognosis estimated based on 09/20/19 clinical assessment is:  
[x] Hours to Days   
[] Days to Weeks   
[] Other: Communicated plan of care with: Hospice Case Manager; Hospice IDT; Care Team 
 
 GOALS OF CARE Patient/Medical POA stated Goal of Care: Hospice care 
 
[x] I have reviewed and/or updated ACP information in the Advance Care Planning Navigator. This information is available in the [address] link in the patient's chart header. Primary Decision Maker ([address]):  
 
Resuscitation Status: DNR If DNR is there a Durable DNR on file? : [x] Yes [] No (If no, complete Durable DNR) HISTORY History obtained from: Chart, daughter, bedside nurse CHIEF COMPLAINT: Fever The patient is:  
[] Verbal 
[x] Nonverbal 
[] Unresponsive HPI/SUBJECTIVE: Patient is nonverbal.  His eyes are open but unable to follow commands or respond. Still with facial frowning and grimacing. Pt did have a bowel movement yesterday night and also urine output slightly better: 400cc this last shift Overnight received all scheduled robinul and morphine 9/9patient much more awake but not alert. Grabbing my hand at times but it almost seems involuntary. Occasional facial grimacing 9/10less grimacing today. Not as awake. 9/11-appears comfortable right now 9/12 patient much more somnolent today. Eyes are not open. 9/13patient clearly more ashen today. Eyes barely open. 9/16-patient not responsive with eyes closed most of the visit 9/17 patient appears comfortable today. Eyes slightly open 9/18patient resting. Eyes slightly open. 9/19clearly with increased work of breathing appears mildly uncomfortable. Remains unresponsive 9/20patient unresponsive. Even his eyes now are essentially closed. Having long periods of apnea. Breathing less labored REVIEW OF SYSTEMS The following systems were: [] reviewed  [x] unable to be reviewed Adult Non-Verbal Pain Assessment Score 1 Face 
[] 0   No particular expression or smile [x] 1   Occasional grimace, tearing, frowning, wrinkled forehead 
[] 2   Frequent grimace, tearing, frowning, wrinkled forehead Activity (movement) [x] 0   Lying quietly, normal position 
[] 1   Seeking attention through movement or slow, cautious movement 
[] 2   Restless, excessive activity and/or withdrawal reflexes Guarding 
[x] 0   Lying quietly, no positioning of hands over areas of body 
[] 1   Splinting areas of the body, tense 
[] 2   Rigid, stiff Physiology (vital signs) [x] 0   Stable vital signs [] 1   Change in any of the following: SBP > 20mm Hg; HR > 20/minute 
[] 2   Change in any of the following: SBP > 30mm Hg; HR > 25/minute Respiratory [x] 0   Baseline RR/SpO2, compliant with ventilator 
[] 1   RR > 10 above baseline, or 5% drop SpO2, mild asynchrony with ventilator 
[] 2   RR > 20 above baseline, or 10% drop SpO2, asynchrony with ventilator FUNCTIONAL ASSESSMENT Palliative Performance Scale (PPS): 10 PSYCHOSOCIAL/SPIRITUAL ASSESSMENT Active Problems: 
  Sepsis ([facility] [address] (9/1/2019) Past Medical History:  
Diagnosis Date  CHF (congestive heart failure) ([facility] [address] 9/1/2019  Esophageal cancer [facility] 9/1/2019  H/O tracheostomy 9/1/2019  
 History of completed stroke 9/1/2019 No past surgical history on file. Social History Tobacco Use  Smoking status: Unknown If Ever Smoked Substance Use Topics  Alcohol use: Not Currently Family History Family history unknown: Yes No Known Allergies Current Facility-Administered Medications Medication Dose Route Frequency  morphine injection 6 mg  6 mg IntraVENous Q2H  
 morphine injection 4 mg  4 mg IntraVENous Q15MIN PRN  
 acetaminophen (TYLENOL) suppository 650 mg  650 mg Rectal Q4H PRN  
 white petrolatum-mineral oil (LACRILUBE S.O.P.) ointment   Both Eyes Q12H  
 LORazepam (ATIVAN) injection 1 mg  1 mg IntraVENous Q15MIN PRN  
  ondansetron (ZOFRAN) injection 4 mg  4 mg IntraVENous Q4H PRN  
 scopolamine (TRANSDERM-SCOP) 1 mg over 3 days 1 Patch  1 Patch TransDERmal Q72H  
 glycopyrrolate (ROBINUL) injection 0.2 mg  0.2 mg IntraVENous Q4H  
 bisacodyl (DULCOLAX) suppository 10 mg  10 mg Rectal DAILY PRN  
 
 
 PHYSICAL EXAM  
 
Wt Readings from Last 3 Encounters:  
09/17/19 145 lb (65.8 kg) 09/01/19 145 lb (65.8 kg) 07/30/19 145 lb (65.8 kg) Visit Vitals BP (!) 42/32 (BP 1 Location: Left arm, BP Patient Position: Supine) Pulse (!) 50 Temp (!) 94.3 °F (34.6 °C) Resp 14 Wt 145 lb (65.8 kg) SpO2 90% BMI 23.40 kg/m² Supplemental O2  [x] Yes  [] NO Last bowel movement:  
 
Currently this patient has: 
[x] Peripheral IV [] PICC  [] PORT [] ICD [x] Cummings Catheter [] NG Tube   [x] PEG Tube   
[] Rectal Tube [] Drain 
[] Other:  
 
Constitutional: Ill-appearing, ashen, nonverbal, unresponsive Eyes: Eyes are open but sunken ENMT: Justen Hence in place. Cardiovascular: Regular rhythm Respiratory: Breathing no longer labored, prolonged periods of apnea Gastrointestinal: Soft, PEG in place Musculoskeletal: Muscle wasting, emaciated Skin: Poor skin turgor, cool extremities Neurologic: Nonverbal 
Psychiatric: Calm Other:  
 
 
Pertinent Lab and or Imaging Tests: 
Lab Results Component Value Date/Time Sodium 139 09/01/2019 05:19 PM  
 Potassium 5.3 (H) 09/01/2019 05:19 PM  
 Chloride 101 09/01/2019 05:19 PM  
 CO2 28 09/01/2019 05:19 PM  
 Anion gap 10 09/01/2019 05:19 PM  
 Glucose 122 (H) 09/01/2019 05:19 PM  
 BUN 53 (H) 09/01/2019 05:19 PM  
 Creatinine 2.12 (H) 09/01/2019 05:19 PM  
 BUN/Creatinine ratio 25 (H) 09/01/2019 05:19 PM  
 GFR est AA 37 (L) 09/01/2019 05:19 PM  
 GFR est non-AA 31 (L) 09/01/2019 05:19 PM  
 Calcium 8.6 09/01/2019 05:19 PM  
 
Lab Results Component Value Date/Time Protein, total 7.4 09/01/2019 05:19 PM  
 Albumin 2.2 (L) 09/01/2019 05:19 PM  
 
   
 
Total time: Counseling / coordination time:  
> 50% counseling / coordination?:

## 2019-09-20 NOTE — HOSPICE
Report to [facility], Rick Bello, [phone] regarding patient's status. No updates needed over the weekend.  Will update Louis Jennings

## 2019-09-20 NOTE — HOSPICE
Called Mr Murillo's daughter, Cami Neumann, to offer continued support and assurance of our continuing availability. Offered supportive listening as she expressed her and the family's appreciation for the opportunity and the support they received as they visited with him on yesterday. She asked for and I assured her that we would continue to hold him in prayer. Visited with him after the phone call. I shared with him Drea's and the family's expressed thanks for seeing him on yesterday. Offered prayer for God's continued loving care for him and family.

## 2019-09-20 NOTE — ROUTINE PROCESS
Bedside and Verbal shift change report given to Pool Arguello RN (oncoming nurse) by Gaurav Encarnacion RN (offgoing nurse). Report included the following information SBAR, Kardex, Intake/Output, MAR, Accordion and Recent Results.

## 2019-09-21 NOTE — PROGRESS NOTES
Patient unresponsive. Pupils fixed. No spontaneous respirations. No heart sounds auscultated.  
 
Death pronounced at 07:40 am

## 2019-09-21 NOTE — PROGRESS NOTES
Problem: Falls - Risk of 
Goal: *Absence of Falls Outcome: Progressing Towards Goal 
Note:  
Fall Risk Interventions: 
Mobility Interventions: Bed/chair exit alarm, Patient to call before getting OOB Mentation Interventions: Adequate sleep, hydration, pain control, Bed/chair exit alarm, Room close to nurse's station, More frequent rounding Medication Interventions: Bed/chair exit alarm, Evaluate medications/consider consulting pharmacy, Teach patient to arise slowly, Patient to call before getting OOB Elimination Interventions: Bed/chair exit alarm, Call light in reach, Patient to call for help with toileting needs, Toileting schedule/hourly rounds History of Falls Interventions: Bed/chair exit alarm, Investigate reason for fall, Room close to nurse's station

## 2019-09-21 NOTE — PROGRESS NOTES
This RN helped primary RN fill out record of death form, therefore pt chart was accessed. All pertinent personnel were notified.

## 2019-09-21 NOTE — PROGRESS NOTES
Guard came out of room to notify of patient not breathing. Two RNs entered into room to check on patient. Not able to palpate pulse. Auscultated heart and lungs; no sounds heard. Palpated and auscultated carotid; no response. On-call doctor notified; Dr. Enid Hou. Time of death pronounced at 18. Nursing supervisor,  and hospice notified. [employer], quality management and medical examiner contacted.

## 2019-09-21 NOTE — HOSPICE
[facility] Good Help to Those in Need 
[phone] Discharge/Death Nursing Note Patient Name: Nigel Cameron YOB: 1945 Age: 69 y.o. Date of Death: 19 Admitted Date: 2019 Time of Death: 18 Facility of Care: [facility] Level of Care: [facility] Patient Room: [facility] Hospice Attending: Olena Lilly MD 
Hospice Diagnosis: Sepsis [facility]) [A41.9] Death Pronouncement Pronouncement of death completed by: Dr. Armando Aparicio Agency staff was not  present at the time of death At the time of death the patient was documented as unresponsive. Pupils fixed. No spontaneous respirations. No heart sounds auscultated The pt  within [facility] The following were notified of the patient's death: hospice MD, hospice staff, daughter Medications were disposed of per facility protocol Discharge Summary Discharge Reason: Death Summary of Care Provided: 
 
[x] Post mortem care provided by bedside nursing 
[x] Notification of  home by nursing supervisor 
[] Referrals/Community resources provided:  
[] Goals completed 
[] Durable Medical Equipment vendor notified Disciplines involved: [x] RN [x] SW [x]  [] MONGE [] Vol [] PT [] OT [] ST [] BC 
 
[x] IDT communication/notification Attending Physician, Dr. Rozetta Romberg, notified of death Bereaved Tulio Sands, daughter low risk [phone] Advance Care Planning 2019 Confirm Advance Directive Yes, on file

## 2019-09-21 NOTE — PROGRESS NOTES
Bedside and Verbal shift change report given to  (oncoming nurse) by RobynRN (offgoing nurse). Report included the following information SBAR and Kardex.

## 2019-09-21 NOTE — PROGRESS NOTES
Responded to notification of Mr Pepe Death on 5 Med Surg. Officers were in the room. Provide prayer of commendation. Consulted with RN. Visited by: Shayan Garvin MS., [address] ([id number])

## 2019-09-23 NOTE — DISCHARGE SUMMARY
Hospice Discharge Summary [facility] Good Help to Those in Need Date of Admission: 9/2/2019 Date of Discharge: 9/21/2019 Stephanie Miller is a 69y.o. year old who was admitted to [facility] at [facility] with a Hospice diagnosis of Sepsis ([facility] [address] [A41.9]. The patient's care was focused on comfort and the patient passed away on 9/21/2019. Yaritza Domínguez

## 2019-09-23 NOTE — HOSPICE
LCSW heard from Hospice RN that patient's daughter, Home Tobar ([phone]) requesting information about obtaining patient's body and burying closer to family. LCSW spoke with [address] [place] [address] RN who states that body is at [address] [place] [address] at this time and ME work is completed. Body is to be released to [facility]. LCSW called [place] and spoke with [facility] staff. They shared that MD of [facility] plans to call Ms. Chow to discuss plans for his body. LCSW called Ms. Chow to let her know and provided her [employer] number should they not reach out in a timely manner. LCSW expressed condolences and Ms. Chow shared her appreciation for care of her father. JAYDA Gale, [facility] Social Worker 
[phone]

## 2019-10-17 NOTE — PROGRESS NOTES
Bedside and Verbal shift change report given to [address] rn  (oncoming nurse) by Cynthia Marcus  (offgoing nurse). Report included the following information SBAR and Kardex. no

## 2020-03-09 NOTE — HOSPICE
[address] [place] [address]  [facility]  [phone]     [facility] Daily Nursing Note   Patient Name: Messi Murillo  Date of Birth: 1945  Age: 74 y.o.     Date of Visit: 09/12/19  Facility of [facility]  Patient Room: [facility]     Hospice Attending: Dino Haile MD  Hospice Diagnosis: Sepsis [facility]) [A41.9]     Level of Care: GIP     Current GIP Symptoms    1. Shallow respirations w/ periods of apnea. Lungs coarse and diminished  2. Skin dry and cool  3. Remains Hypotensive  4. Eyes open to verbal stimuli, slight grimacing to touch     ASSESSMENT & PLAN      1.  Continue scheduled medications for pain and excessive secretions  2. Continue RN assessments to determine need for PRN medications  3. Administer PRN medications as indicated  4. Continue to turn and reposition as tolerated        Spiritual Interventions:  visits as indicated     Psych/ Social/ Emotional Interventions: Permission granted from [facility] to have daughter on Telephone call to patient. Telephone call from daughter arranged for lunchtime today     Care Coordination Needs: continue to discuss w/ [id number] staff and team, Tasia Umaña from [facility] updated on [facility] and New Orders discussed / approved with Dr. Sissy Butler.     Description History and Chart Review   If this is initial GIP note must document RN assessment/MD communication in previous setting. Specifically document nursing/medication needs in last 24 hours to support GIP care  Narrative History of last 24 hours that demonstrates care cannot be provided in another setting:  Patient remains too unstable to transport, requires frequent IV medications for symptom management and frequent RN eval to assess effectiveness of medications     What has been done to control the patient's symptoms in the last 24 hours?   Continue IV pain medication, scheduled q2 hr for pain management, continue to titrate symptom medications as needed, continue frequent nursing assessments to assess effectiveness of medications     Does the patient currently require IV medications? yes  Does the patient currently require scheduled medications? yes  Does the patient currently require a PCA? no        Supporting documentation for GIP need for pain control:  [x] Frequent evaluation by a doctor, nurse practitioner, nurse   [x] Frequent medication adjustment    [x] IVs that cannot be administered at home   [] Aggressive pain management   [] Complicated technical delivery of medications                                                                                                                                   Supporting documentation for GIP need for symptom control:  []  Sudden decline necessitating intensive nursing intervention  []  Uncontrolled / intractable nausea or vomiting   []  Pathological fractures  []  Advanced open wounds requiring frequent skilled care  [] Unmanageable respiratory distress  [] New or worsening delirium   [] Delirium with behavior issues: Is 24 hour caregiver present due to safety concerns with agitation? (yes/no)  [x] Imminent death  with skilled nursing needs documented above     DISCHARGE PLANNING   Daily discharge planning required for GIP  1.  Discharge Plan: If patient's condition improves, he may be transferred to the [facility] or back to [facility].   2. Patient/Family teaching: discussed with guards signs and symptoms related to death   3. Response to patient/family teaching: guards will follow their protocol upon patient's death     ASSESSMENT    KARNOFSKY: 10     Prognosis estimated based on 09/10/19 clinical assessment is:   [] Few to Many Hours  [x] Hours to Days   [] Few to Many Days   [] Days to Weeks   [] Few to Many Weeks   [] Weeks to Months   [] Few to Many Months     Quality Measure:       Patient self-reports:  [] Yes       [x] No           CLINICAL INFORMATION      Patient Vitals for the past 12 hrs:    Temp Pulse Resp BP SpO2   09/13/19 0745 97.5 °F (36.4 °C) 75 8 100/69 97 %         Currently this patient has:  [] Supplemental M4         [x] SQ access                      [] PICC                 [] PORT   [] NG Tube          [] PEG Tube        [] Ostomy     [x] Cummings draining clear naresh urine  [] Other:      SIGNS/PHYSICAL FINDINGS      Skin (including wound):  [] Warm, dry, supple, intact and color normal for race  [] Warm   [x] Dry   [x] Cool     [] Clammy       [] Diaphoretic    Turgor              [] Normal              [] Decreased  Color:              [] Pink              [] Pale              [] Cyanotic              [] Erythema              [] Jaundice              [x] Normal for Race  []  Wounds:     Neuro:  [] Lethargy  [] Restlessness / agitation  [] Confusion / delirium  [] Hallucinations  [x] opens eyes to verbal stimulation  [] Unresponsive  [] Seizures      Cardiac:  [] Dyspnea on Exertion  [] JVD  [] Murmur  [] Palpitations  [x] Hypotension  [] Hypertension  [] Tachycardia  [] Bradycardia  [] Irregular HR  [x] Pulses Decreased- pedal  [] Pulses Absent  [x] Edema:      feet     Respiratory:  Breath sounds:               [x] Diminished              [] Wheeze              [x] Rhonchi              [] Rales   [] Even and unlabored  [] Labored:            [] Cough              [] Non Productive              [] Productive                          [] Description:           [] Deep suctioned   [] O2 at ___ LPM  [] High flow oxygen greater than 10 LPM  [] Bi-Pap     GI  [] Abdomen (describe)   [] Ascites  [] Nausea  [] Vomiting  [] Incontinent of bowels  [] Bowel sounds (yes/no)  [] Diarrhea  [] Constipation (see above including last bowel movement)  [] Checked for impaction  [x] Last BM 09/10     Nutrition  Diet:_NPO_________  Appetite:   [] Good   [] Fair   [] Poor   [] Tube Feeding        [] Voiding  [] Incontinent   [x] Cummings     Musculoskeletal  [] Balance/[place] Unsteady   [] Weak   Strength:               [] Normal             [] Limited               [] Decreasing   Activities:               [] Up as tolerated              [x] Bedridden               [] Specify:     SAFETY  [] 24 hr. Caregiver   [x] Side rails ?    [x] Hospital bed   [] Reviewed Falls & Safety         ALLERGIES AND MEDICATIONS      Allergies: No Known Allergies                 Current Facility-Administered Medications   Medication Dose Route Frequency    morphine injection 4 mg  4 mg IntraVENous Q2H    morphine injection 4 mg  4 mg IntraVENous Q15MIN PRN    ketorolac (TORADOL) injection 15 mg  15 mg IntraVENous Q6H PRN    acetaminophen (TYLENOL) suppository 650 mg  650 mg Rectal Q4H PRN    white petrolatum-mineral oil (LACRILUBE S.O.P.) ointment   Both Eyes Q12H    LORazepam (ATIVAN) injection 1 mg  1 mg IntraVENous Q15MIN PRN    ondansetron (ZOFRAN) injection 4 mg  4 mg IntraVENous Q4H PRN    scopolamine (TRANSDERM-SCOP) 1 mg over 3 days 1 Patch  1 Patch TransDERmal Q72H    glycopyrrolate (ROBINUL) injection 0.2 mg  0.2 mg IntraVENous Q4H    bisacodyl (DULCOLAX) suppository 10 mg  10 mg Rectal DAILY PRN             Visit Time In: 1000  Visit Time JNK: 0384 Initial (On Arrival)